# Patient Record
Sex: MALE | Race: WHITE | NOT HISPANIC OR LATINO | Employment: OTHER | URBAN - METROPOLITAN AREA
[De-identification: names, ages, dates, MRNs, and addresses within clinical notes are randomized per-mention and may not be internally consistent; named-entity substitution may affect disease eponyms.]

---

## 2017-01-05 ENCOUNTER — TELEPHONE (OUTPATIENT)
Dept: INFECTIOUS DISEASES | Facility: CLINIC | Age: 77
End: 2017-01-05

## 2017-01-05 NOTE — TELEPHONE ENCOUNTER
Patient called me on 1/2 to advise that he was in [place] and was unable to make his follow up appointments.  He reports he is overall feeling well.  Still taking suppressive abx.  Advised he will re-schedule.  I will have MA follow up to ensure it is scheduled.

## 2017-01-05 NOTE — TELEPHONE ENCOUNTER
Called patient to reschedule. There was no answer.On 1/3 he cancelled his appointments and said he will out of town for some business and will call back to reschedule. I will contact him again.

## 2017-01-06 RX ORDER — CEPHALEXIN 500 MG/1
CAPSULE ORAL
Qty: 60 CAPSULE | Refills: 0 | Status: SHIPPED | OUTPATIENT
Start: 2017-01-06 | End: 2017-01-08 | Stop reason: SDUPTHER

## 2017-01-07 DIAGNOSIS — I38 ENDOCARDITIS, UNSPECIFIED CHRONICITY, UNSPECIFIED ENDOCARDITIS TYPE: Primary | ICD-10-CM

## 2017-01-08 DIAGNOSIS — I38 ENDOCARDITIS, UNSPECIFIED CHRONICITY, UNSPECIFIED ENDOCARDITIS TYPE: Primary | ICD-10-CM

## 2017-01-08 RX ORDER — CEPHALEXIN 500 MG/1
CAPSULE ORAL
Qty: 60 CAPSULE | Refills: 0 | Status: SHIPPED | OUTPATIENT
Start: 2017-01-08 | End: 2017-02-24 | Stop reason: SDUPTHER

## 2017-01-08 RX ORDER — CEPHALEXIN 500 MG/1
CAPSULE ORAL
Qty: 60 CAPSULE | OUTPATIENT
Start: 2017-01-08

## 2017-02-03 ENCOUNTER — TELEPHONE (OUTPATIENT)
Dept: INFECTIOUS DISEASES | Facility: CLINIC | Age: 77
End: 2017-02-03

## 2017-02-03 NOTE — TELEPHONE ENCOUNTER
Patient for scheduled appointment today as well as echocardiogram.  He call and cancelled this morning.  He has cancelled last several appointments and prior scheduled Echos. He is apparently out of town. He did not re-schedule.  Will follow up and attempt to make follow up appointment.  Needs to continue abx suppressive therapy until he sees he and has f/u echo.

## 2017-02-06 ENCOUNTER — OFFICE VISIT (OUTPATIENT)
Dept: FAMILY MEDICINE | Facility: CLINIC | Age: 77
End: 2017-02-06
Payer: COMMERCIAL

## 2017-02-06 ENCOUNTER — LAB VISIT (OUTPATIENT)
Dept: LAB | Facility: HOSPITAL | Age: 77
End: 2017-02-06
Attending: INTERNAL MEDICINE
Payer: COMMERCIAL

## 2017-02-06 VITALS
HEIGHT: 68 IN | WEIGHT: 219.13 LBS | BODY MASS INDEX: 33.21 KG/M2 | TEMPERATURE: 98 F | OXYGEN SATURATION: 98 % | DIASTOLIC BLOOD PRESSURE: 80 MMHG | SYSTOLIC BLOOD PRESSURE: 122 MMHG | HEART RATE: 81 BPM

## 2017-02-06 DIAGNOSIS — I38: ICD-10-CM

## 2017-02-06 DIAGNOSIS — Z85.038 HISTORY OF COLON CANCER: ICD-10-CM

## 2017-02-06 DIAGNOSIS — I50.20: ICD-10-CM

## 2017-02-06 DIAGNOSIS — Z12.5 SCREENING FOR PROSTATE CANCER: ICD-10-CM

## 2017-02-06 DIAGNOSIS — E78.5 HYPERLIPIDEMIA, UNSPECIFIED HYPERLIPIDEMIA TYPE: ICD-10-CM

## 2017-02-06 DIAGNOSIS — R73.9 HYPERGLYCEMIA: ICD-10-CM

## 2017-02-06 DIAGNOSIS — F39 MOOD DISORDER: ICD-10-CM

## 2017-02-06 DIAGNOSIS — N18.30 CHRONIC KIDNEY DISEASE, STAGE III (MODERATE): ICD-10-CM

## 2017-02-06 DIAGNOSIS — I10 ESSENTIAL HYPERTENSION: ICD-10-CM

## 2017-02-06 DIAGNOSIS — E03.9 HYPOTHYROIDISM, UNSPECIFIED TYPE: ICD-10-CM

## 2017-02-06 DIAGNOSIS — Z00.00 ROUTINE MEDICAL EXAM: Primary | ICD-10-CM

## 2017-02-06 DIAGNOSIS — I77.9 CAROTID DISEASE, BILATERAL: ICD-10-CM

## 2017-02-06 LAB
ALBUMIN SERPL BCP-MCNC: 3.5 G/DL
ALP SERPL-CCNC: 29 U/L
ALT SERPL W/O P-5'-P-CCNC: 20 U/L
ANION GAP SERPL CALC-SCNC: 8 MMOL/L
AST SERPL-CCNC: 25 U/L
BASOPHILS # BLD AUTO: 0.04 K/UL
BASOPHILS NFR BLD: 0.4 %
BILIRUB SERPL-MCNC: 0.6 MG/DL
BUN SERPL-MCNC: 20 MG/DL
CALCIUM SERPL-MCNC: 9.3 MG/DL
CHLORIDE SERPL-SCNC: 108 MMOL/L
CO2 SERPL-SCNC: 23 MMOL/L
COMPLEXED PSA SERPL-MCNC: 0.9 NG/ML
CREAT SERPL-MCNC: 1.3 MG/DL
DIFFERENTIAL METHOD: ABNORMAL
EOSINOPHIL # BLD AUTO: 0.4 K/UL
EOSINOPHIL NFR BLD: 3.7 %
ERYTHROCYTE [DISTWIDTH] IN BLOOD BY AUTOMATED COUNT: 14.6 %
EST. GFR  (AFRICAN AMERICAN): >60 ML/MIN/1.73 M^2
EST. GFR  (NON AFRICAN AMERICAN): 52.3 ML/MIN/1.73 M^2
GLUCOSE SERPL-MCNC: 151 MG/DL
HCT VFR BLD AUTO: 37.7 %
HGB BLD-MCNC: 12.1 G/DL
LYMPHOCYTES # BLD AUTO: 3.1 K/UL
LYMPHOCYTES NFR BLD: 28.2 %
MCH RBC QN AUTO: 28.7 PG
MCHC RBC AUTO-ENTMCNC: 32.1 %
MCV RBC AUTO: 90 FL
MONOCYTES # BLD AUTO: 0.9 K/UL
MONOCYTES NFR BLD: 7.9 %
NEUTROPHILS # BLD AUTO: 6.6 K/UL
NEUTROPHILS NFR BLD: 59.5 %
PLATELET # BLD AUTO: 270 K/UL
PMV BLD AUTO: 10.8 FL
POTASSIUM SERPL-SCNC: 4.7 MMOL/L
PROT SERPL-MCNC: 7.4 G/DL
RBC # BLD AUTO: 4.21 M/UL
SODIUM SERPL-SCNC: 139 MMOL/L
T4 FREE SERPL-MCNC: 0.99 NG/DL
TSH SERPL DL<=0.005 MIU/L-ACNC: 1.73 UIU/ML
WBC # BLD AUTO: 11.08 K/UL

## 2017-02-06 PROCEDURE — 99397 PER PM REEVAL EST PAT 65+ YR: CPT | Mod: S$GLB,,, | Performed by: INTERNAL MEDICINE

## 2017-02-06 PROCEDURE — 85025 COMPLETE CBC W/AUTO DIFF WBC: CPT

## 2017-02-06 PROCEDURE — 83036 HEMOGLOBIN GLYCOSYLATED A1C: CPT

## 2017-02-06 PROCEDURE — 84439 ASSAY OF FREE THYROXINE: CPT

## 2017-02-06 PROCEDURE — 36415 COLL VENOUS BLD VENIPUNCTURE: CPT | Mod: PO

## 2017-02-06 PROCEDURE — 84153 ASSAY OF PSA TOTAL: CPT

## 2017-02-06 PROCEDURE — 3079F DIAST BP 80-89 MM HG: CPT | Mod: S$GLB,,, | Performed by: INTERNAL MEDICINE

## 2017-02-06 PROCEDURE — 84443 ASSAY THYROID STIM HORMONE: CPT

## 2017-02-06 PROCEDURE — 99999 PR PBB SHADOW E&M-EST. PATIENT-LVL III: CPT | Mod: PBBFAC,,, | Performed by: INTERNAL MEDICINE

## 2017-02-06 PROCEDURE — 3074F SYST BP LT 130 MM HG: CPT | Mod: S$GLB,,, | Performed by: INTERNAL MEDICINE

## 2017-02-06 PROCEDURE — 80053 COMPREHEN METABOLIC PANEL: CPT

## 2017-02-06 NOTE — PROGRESS NOTES
Chief complaint: Physical exam         77-year-old white male here for his physical.  It looks like he is due for annual labs and his PSA.  He's had some hyperglycemia so we will assess for diabetes although A1c is normal in the past.  He has hypothyroidism and we will reassess overall control.  He has hypertension and he has a cardiomyopathy but apparently no symptoms referable to that.  He does have some chronic kidney disease that needs reassessment as well.  He has a history of colon cancer and apparently has had screening outside of our system    ROS:   CONST: weight stable. EYES: no vision change. ENT: no sore throat. CV: no chest pain w/ exertion. RESP: no shortness of breath. GI: no nausea, vomiting, diarrhea. No dysphagia. : no urinary issues. MUSCULOSKELETAL: no new myalgias or arthralgias. SKIN: no new changes. NEURO: no focal deficits. PSYCH: no new issues. ENDOCRINE: no polyuria. HEME: no lymph nodes. ALLERGY: no general pruritis.    Past Medical History   Diagnosis Date    Abdominal hernia     Hypertension     GERD (gastroesophageal reflux disease)     Hyperlipidemia     MI, old 2014    Colon cancer--- ressected over 15 yrs, scope normal 2016     Endocarditis     Cardiac arrest 11/2015    Cardiomyopathy     Coronary artery disease     S/P CABG (coronary artery bypass graft)     S/P AVR     Mitral regurgitation     Hypothyroidism     Essential hypertension     Chronic kidney disease, stage III (moderate)     Systolic heart failure due to valvular disease     Carotid disease, bilateral    Anxiety disorder 2016    Past Surgical History   Procedure Laterality Date    Colon surgery  2000    Cardiac surgery      Tonsillectomy      Aortic valve replacement  2014    Cardiac valve surgery       Bio AVR 2014    Coronary artery bypass graft  2014     x2     Social history: Never smoked, was drinking about 4-5 beers per day,  with children, enjoys fishing, retired    Family  history: Father  at 83 and mom  at 97 without any known medical problems but there is unspecified hypertension in the family.  Sisters without any known medical problems.    Vital signs as above,   Gen: no distress  EYES: conjunctiva clear, non-icteric, PERRL  ENT: nose clear, nasal mucosa normal, oropharynx clear and moist, teeth good  NECK:supple, thyroid non-palpable  RESP: effort is good, lungs clear  CV: heart RRR w/o murmur, gallops or rubs; no carotid bruits, +1 edema  GI: abdomen soft, non-distended, non-tender, no hepatosplenomegaly  MS: gait normal, no clubbing or cyanosis of the digits  SKIN: no rashes, warm to touch    Rolo was seen today for annual exam.    Diagnoses and all orders for this visit:    Routine medical exam -UTD on colon, check PSA    Screening for prostate cancer  -     PSA, Screening; Future    Hypothyroidism, unspecified type  -     TSH; Future  -     T4, free; Future    Mood disorder, take wellbutrin DAILY    Chronic kidney disease, stage III (moderate)  -     Comprehensive metabolic panel; Future  -     CBC auto differential; Future    Systolic heart failure due to valvular disease    Essential hypertension, good    Hyperlipidemia, unspecified hyperlipidemia type    Carotid disease, bilateral    Hyperglycemia  -     Hemoglobin A1c; Future    History of colon cancer

## 2017-02-07 LAB
ESTIMATED AVG GLUCOSE: 126 MG/DL
HBA1C MFR BLD HPLC: 6 %

## 2017-02-15 ENCOUNTER — TELEPHONE (OUTPATIENT)
Dept: FAMILY MEDICINE | Facility: CLINIC | Age: 77
End: 2017-02-15

## 2017-02-15 NOTE — TELEPHONE ENCOUNTER
Pt came in this afternoon for a blood pressure check. Pt states he took his blood pressure medications about 4 hours ago. metoprolol succinate (TOPROL-XL) 25 MG, pt took 1 1/2 tablets and lisinopril (PRINIVIL,ZESTRIL) 20 MG tablet, pt took 2 tablets. He checked his blood pressure around noon and it was 184/102.    His blood pressure when he came into the office @ 12:45pm was as follows:    BP: 192/98  P: 94  O2: 96%

## 2017-02-15 NOTE — TELEPHONE ENCOUNTER
Patient came in the office Wednesday with I blood pressure so when calling with results also has, blood pressures doing    Dr. SUNSHINE says all the recent lab work looks stable.  The kidney insufficiency is the same as before.  Slight anemia is the same.  Sugar thyroid and liver all normal.  PSA normal

## 2017-02-15 NOTE — TELEPHONE ENCOUNTER
Patient seen and examined by myself even though he didn't have an appointment.  No neurological deficits and was more anxious.  I recommend he go home and continue to take his medications and he was reassured that the acute rise in his blood pressure will not cause a stroke or damage his heart valve.  We might need to increase his medications but previously on this medication his blood pressure was fine.  His pulse was okay to justify an occasional increase in his metoprolol.  I reassured him that I would not recommend giving him clonidine in the office since it might have unpredictable effects

## 2017-02-16 NOTE — TELEPHONE ENCOUNTER
Informed patient of information below. Verbalized understanding. Stated, blood pressure 160/90 this morning 1 hour after taking his medication (metoprolol 2 pills and lisinopril 2 pills). Please advise.

## 2017-02-16 NOTE — TELEPHONE ENCOUNTER
Reassure the medications may continue to have more effect and may need time.  He can monitor today on the current dosing

## 2017-02-17 ENCOUNTER — NURSE TRIAGE (OUTPATIENT)
Dept: ADMINISTRATIVE | Facility: CLINIC | Age: 77
End: 2017-02-17

## 2017-02-17 ENCOUNTER — TELEPHONE (OUTPATIENT)
Dept: FAMILY MEDICINE | Facility: CLINIC | Age: 77
End: 2017-02-17

## 2017-02-17 NOTE — TELEPHONE ENCOUNTER
----- Message from Danette Gray sent at 2/16/2017  3:10 PM CST -----  Patient is returning a call from the office. Please call at [phone] Thank you!

## 2017-02-17 NOTE — TELEPHONE ENCOUNTER
Reason for Disposition   [1] BP  >= 140/90 AND [2] taking BP medications    Protocols used: ST HIGH BLOOD PRESSURE-A-    Patient called with concerns about his bp being elevated. bp right now is 161/90 heart rate of 74, no symptoms reported. Patient encouraged to log his bp values and present them to pcp next appt. Patient knows to seek medical attn if his bp rises more and he starts to having symptoms.

## 2017-02-21 ENCOUNTER — TELEPHONE (OUTPATIENT)
Dept: FAMILY MEDICINE | Facility: CLINIC | Age: 77
End: 2017-02-21

## 2017-02-21 ENCOUNTER — NURSE TRIAGE (OUTPATIENT)
Dept: ADMINISTRATIVE | Facility: CLINIC | Age: 77
End: 2017-02-21

## 2017-02-21 NOTE — TELEPHONE ENCOUNTER
"Amanda Juan RN 6 minutes ago (9:16 AM)                       Reason for Disposition   BP >= 180/110    Answer Assessment - Initial Assessment Questions  1. BLOOD PRESSURE: "What is the blood pressure?" "Did you take at least two measurements 5 minutes apart?"  /88, HR=64 before taking meds.   2. ONSET: "When did you take your blood pressure?"  20 mins ago   3. HOW: "How did you obtain the blood pressure?" (e.g., visiting nurse, automatic home BP monitor)  Yes 4. HISTORY: "Do you have a history of high blood pressure?"  Yes   5. MEDICATIONS: "Are you taking any medications for blood pressure?" "Have you missed any doses recently?"  Doubled metoprolol to 50 mg recently, lisinopril, No   6. OTHER SYMPTOMS: "Do you have any symptoms?" (e.g., headache, chest pain, blurred vision, difficulty breathing, weakness)  No CP, no SOB,, no HA    Protocols used: ST HIGH BLOOD PRESSURE-A-     Call back with questions.        "

## 2017-02-21 NOTE — TELEPHONE ENCOUNTER
"    Reason for Disposition   BP  >= 180/110    Answer Assessment - Initial Assessment Questions  1. BLOOD PRESSURE: "What is the blood pressure?" "Did you take at least two measurements 5 minutes apart?"      /88, HR=64 before taking meds.   2. ONSET: "When did you take your blood pressure?"      20 mins ago   3. HOW: "How did you obtain the blood pressure?" (e.g., visiting nurse, automatic home BP monitor)      Yes 4. HISTORY: "Do you have a history of high blood pressure?"      Yes   5. MEDICATIONS: "Are you taking any medications for blood pressure?" "Have you missed any doses recently?"      Doubled metoprolol to 50 mg recently, lisinopril, No   6. OTHER SYMPTOMS: "Do you have any symptoms?" (e.g., headache, chest pain, blurred vision, difficulty breathing, weakness)     No CP, no SOB,, no HA    Protocols used:  HIGH BLOOD PRESSURE-A-    Call back with questions.   "

## 2017-02-24 ENCOUNTER — TELEPHONE (OUTPATIENT)
Dept: INFECTIOUS DISEASES | Facility: CLINIC | Age: 77
End: 2017-02-24

## 2017-02-24 DIAGNOSIS — I38 ENDOCARDITIS, UNSPECIFIED CHRONICITY, UNSPECIFIED ENDOCARDITIS TYPE: ICD-10-CM

## 2017-02-24 RX ORDER — CEPHALEXIN 500 MG/1
CAPSULE ORAL
Qty: 60 CAPSULE | Refills: 0 | Status: SHIPPED | OUTPATIENT
Start: 2017-02-24 | End: 2017-03-24 | Stop reason: SDUPTHER

## 2017-02-24 NOTE — TELEPHONE ENCOUNTER
Called patient to reschedule his echo and fu with Dayana Pelletier due to him missing several appointments. He did not answer so I scheduled and mailed him the appointments.

## 2017-03-06 ENCOUNTER — TELEPHONE (OUTPATIENT)
Dept: INFECTIOUS DISEASES | Facility: CLINIC | Age: 77
End: 2017-03-06

## 2017-03-06 NOTE — TELEPHONE ENCOUNTER
Received a message that patient is moving to [place] and will have to cancel both appointments that I scheduled for him. He states he will call back to schedule again.

## 2017-03-06 NOTE — TELEPHONE ENCOUNTER
----- Message from Darleen May sent at 3/6/2017  9:28 AM CST -----  Contact: self  Patient states in process of moving to [place]   Patient need to cancel echo and doctor appointment.   Pt states will call to reschhedule

## 2017-03-09 ENCOUNTER — OFFICE VISIT (OUTPATIENT)
Dept: FAMILY MEDICINE | Facility: CLINIC | Age: 77
End: 2017-03-09
Payer: COMMERCIAL

## 2017-03-09 VITALS
HEART RATE: 80 BPM | DIASTOLIC BLOOD PRESSURE: 78 MMHG | SYSTOLIC BLOOD PRESSURE: 168 MMHG | WEIGHT: 214.06 LBS | HEIGHT: 68 IN | OXYGEN SATURATION: 98 % | TEMPERATURE: 99 F | BODY MASS INDEX: 32.44 KG/M2

## 2017-03-09 DIAGNOSIS — F39 MOOD DISORDER: ICD-10-CM

## 2017-03-09 DIAGNOSIS — D64.9 ANEMIA, UNSPECIFIED TYPE: ICD-10-CM

## 2017-03-09 DIAGNOSIS — F32.A DEPRESSION, UNSPECIFIED DEPRESSION TYPE: ICD-10-CM

## 2017-03-09 DIAGNOSIS — N18.30 CHRONIC KIDNEY DISEASE, STAGE III (MODERATE): ICD-10-CM

## 2017-03-09 DIAGNOSIS — R53.1 EPISODIC WEAKNESS: ICD-10-CM

## 2017-03-09 DIAGNOSIS — I10 ESSENTIAL HYPERTENSION: Primary | ICD-10-CM

## 2017-03-09 PROCEDURE — 1126F AMNT PAIN NOTED NONE PRSNT: CPT | Mod: S$GLB,,, | Performed by: INTERNAL MEDICINE

## 2017-03-09 PROCEDURE — 1160F RVW MEDS BY RX/DR IN RCRD: CPT | Mod: S$GLB,,, | Performed by: INTERNAL MEDICINE

## 2017-03-09 PROCEDURE — 3077F SYST BP >= 140 MM HG: CPT | Mod: S$GLB,,, | Performed by: INTERNAL MEDICINE

## 2017-03-09 PROCEDURE — 3078F DIAST BP <80 MM HG: CPT | Mod: S$GLB,,, | Performed by: INTERNAL MEDICINE

## 2017-03-09 PROCEDURE — 99999 PR PBB SHADOW E&M-EST. PATIENT-LVL III: CPT | Mod: PBBFAC,,, | Performed by: INTERNAL MEDICINE

## 2017-03-09 PROCEDURE — 1157F ADVNC CARE PLAN IN RCRD: CPT | Mod: S$GLB,,, | Performed by: INTERNAL MEDICINE

## 2017-03-09 PROCEDURE — 99215 OFFICE O/P EST HI 40 MIN: CPT | Mod: S$GLB,,, | Performed by: INTERNAL MEDICINE

## 2017-03-09 PROCEDURE — 1159F MED LIST DOCD IN RCRD: CPT | Mod: S$GLB,,, | Performed by: INTERNAL MEDICINE

## 2017-03-09 RX ORDER — SERTRALINE HYDROCHLORIDE 50 MG/1
50 TABLET, FILM COATED ORAL DAILY
Qty: 30 TABLET | Refills: 11 | Status: SHIPPED | OUTPATIENT
Start: 2017-03-09 | End: 2018-03-09

## 2017-03-09 NOTE — PROGRESS NOTES
Chief complaint: Not feeling well, weak spells, follow-up on hypertension and depression         77-year-old white male with multiple medical problems.  He has been depressed lately.  He feels he's not getting along well.  He is on the Wellbutrin 300 although he did not bring his medications and I cannot confirm 100%.  He still angry about the medical events that happened to him outside of our system.  He still very angry at the surgeon who sent him home to die.  We discussed a self referral to our  and counselor here as he does need some assistance coping and going through some anger management and resolution issues.  He is open to that.  He also did well on Zoloft in the past and I explained at great length today that we'll likely need to add an additional medication to raise serotonin.  We will start with 25 mg for 6 days or so and then go to 50 mg.  All these instructions were written out for him.        Continues to have episodes of a few minutes of feeling very weak.  What he describes could well be hypoglycemia.  He has not been eating as well since his wife has not been working.  We discussed that if indeed he keeps candy with him or drink some juice the next time he gets an episode and it resolves more probably I will be supportive of low sugar.    I did review all his prior labs.  His chronic kidney disease is fairly stable at this.  He does admit not drinking a lot of water and I encouraged him to do so.  Next    Regarding hypertension his blood pressure at home has been similar to today at 150.  Again he did not bring his medications but says he is taking the lisinopril 20 mg a day and Toprol 25 mg a day.  At one point when he increased the lisinopril to 40 mg a day his blood pressure started to get too low but we discussed that at the present time his blood pressures definitely high.  I recommended he go to lisinopril 40 mg or 20 mg twice a day.  He may need a total of 30 mg of his blood  pressure gets too low.  His pulse is good we might increase the metoprolol as well.  All these issues reviewed and patient counseled at great length.  Counseled regarding all the above issues especially the psychosocial issues.  He does not feel depressed enough to be harmful to himself.Total time over 45 minutes with over 50% counseling.        ROS:   CONST: weight stable. EYES: no vision change. ENT: no sore throat. CV: no chest pain w/ exertion. RESP: no shortness of breath. GI: no nausea, vomiting, diarrhea. No dysphagia. : no urinary issues. MUSCULOSKELETAL: no new myalgias or arthralgias. SKIN: no new changes. NEURO: no focal deficits. PSYCH: no new issues. ENDOCRINE: no polyuria. HEME: no lymph nodes. ALLERGY: no general pruritis.    Past Medical History   Diagnosis Date    Abdominal hernia     Hypertension     GERD (gastroesophageal reflux disease)     Hyperlipidemia     MI, old     Colon cancer--- ressected over 15 yrs, scope normal      Endocarditis     Cardiac arrest 2015    Cardiomyopathy     Coronary artery disease     S/P CABG (coronary artery bypass graft)     S/P AVR     Mitral regurgitation     Hypothyroidism     Essential hypertension     Chronic kidney disease, stage III (moderate)     Systolic heart failure due to valvular disease     Carotid disease, bilateral    Anxiety disorder/depression 2016    Past Surgical History   Procedure Laterality Date    Colon surgery      Cardiac surgery      Tonsillectomy      Aortic valve replacement      Cardiac valve surgery       Bio AVR 2014    Coronary artery bypass graft  2014     x2     Social history: Never smoked, was drinking about 4-5 beers per day,  with children, enjoys fishing, retired    Family history: Father  at 83 and mom  at 97 without any known medical problems but there is unspecified hypertension in the family.  Sisters without any known medical problems.    Vital signs as above,    Gen: no distress  Rolo was seen today for hypertension and depression.    Diagnoses and all orders for this visit:    Essential hypertension, uncontrolled, medication adjustments as above    Mood disorder, worsening depression, refer to counselor for coping issues as above, add Zoloft to Wellbutrin    Chronic kidney disease, stage III (moderate) chronic and stable, increase fluids, recheck in 1 month    Depression, unspecified depression type    Episodic weakness, suspect low sugars, discussed better diet and more frequent diet, assess response to eating, he has check blood pressure does not appear to be hypotension, all recent labs otherwise unremarkable and symptoms do not suggest any reason to repeat any labs    Anemia, unspecified type, chronic and stable    Other orders  -     sertraline (ZOLOFT) 50 MG tablet; Take 1 tablet (50 mg total) by mouth once daily.

## 2017-03-14 ENCOUNTER — TELEPHONE (OUTPATIENT)
Dept: INFECTIOUS DISEASES | Facility: CLINIC | Age: 77
End: 2017-03-14

## 2017-03-14 NOTE — TELEPHONE ENCOUNTER
Received call from Mr. Blanca on 3/10  advising that he is moving to [place] to be near his son (also after shooting in his neighborhood) and wanted to touch bases to so advise.  He has missed last 6 follow up appointments for endocarditis with me, though he is following with his PCP for HTN and depression.     Plan was for repeat echo at beginning of year to evaluate resolution of prior vegetation and possible discontinuation of suppressive abx,  but he has not kept these appointments. He reports he is still has Keflex and is taking and does not need refill at this time,  though I suspect he has not been taking these.        He states he will set up follow up once settled as he will be returning frequently to [facility].  Have asked him to let me know when needs refills and where his new pharmacy is so I can call.  He reports he is feeling well overall, no fevers, chills, new weakness/fatigue.  Strongly encouraged follow up.  Call immediately with new fevers, chills, other systemic symptoms.     Labs recently done in February.  Reviewed.

## 2017-03-24 DIAGNOSIS — I38 ENDOCARDITIS, UNSPECIFIED CHRONICITY, UNSPECIFIED ENDOCARDITIS TYPE: ICD-10-CM

## 2017-03-24 RX ORDER — LEVOTHYROXINE SODIUM 75 UG/1
TABLET ORAL
Qty: 90 TABLET | Refills: 1 | Status: SHIPPED | OUTPATIENT
Start: 2017-03-24

## 2017-03-24 RX ORDER — ATORVASTATIN CALCIUM 10 MG/1
TABLET, FILM COATED ORAL
Qty: 90 TABLET | Refills: 1 | Status: SHIPPED | OUTPATIENT
Start: 2017-03-24

## 2017-03-24 RX ORDER — CEPHALEXIN 500 MG/1
CAPSULE ORAL
Qty: 60 CAPSULE | Refills: 0 | Status: SHIPPED | OUTPATIENT
Start: 2017-03-24 | End: 2017-04-20 | Stop reason: SDUPTHER

## 2017-04-07 ENCOUNTER — OFFICE VISIT (OUTPATIENT)
Dept: FAMILY MEDICINE | Facility: CLINIC | Age: 77
End: 2017-04-07
Payer: COMMERCIAL

## 2017-04-07 VITALS
SYSTOLIC BLOOD PRESSURE: 150 MMHG | HEART RATE: 99 BPM | DIASTOLIC BLOOD PRESSURE: 70 MMHG | OXYGEN SATURATION: 97 % | TEMPERATURE: 98 F | HEIGHT: 68 IN | WEIGHT: 215.5 LBS | BODY MASS INDEX: 32.66 KG/M2

## 2017-04-07 DIAGNOSIS — E03.9 HYPOTHYROIDISM, UNSPECIFIED TYPE: Primary | ICD-10-CM

## 2017-04-07 PROCEDURE — 1159F MED LIST DOCD IN RCRD: CPT | Mod: S$GLB,,, | Performed by: NURSE PRACTITIONER

## 2017-04-07 PROCEDURE — 1157F ADVNC CARE PLAN IN RCRD: CPT | Mod: S$GLB,,, | Performed by: NURSE PRACTITIONER

## 2017-04-07 PROCEDURE — 1126F AMNT PAIN NOTED NONE PRSNT: CPT | Mod: S$GLB,,, | Performed by: NURSE PRACTITIONER

## 2017-04-07 PROCEDURE — 3077F SYST BP >= 140 MM HG: CPT | Mod: S$GLB,,, | Performed by: NURSE PRACTITIONER

## 2017-04-07 PROCEDURE — 1160F RVW MEDS BY RX/DR IN RCRD: CPT | Mod: S$GLB,,, | Performed by: NURSE PRACTITIONER

## 2017-04-07 PROCEDURE — 99213 OFFICE O/P EST LOW 20 MIN: CPT | Mod: S$GLB,,, | Performed by: NURSE PRACTITIONER

## 2017-04-07 PROCEDURE — 3078F DIAST BP <80 MM HG: CPT | Mod: S$GLB,,, | Performed by: NURSE PRACTITIONER

## 2017-04-07 PROCEDURE — 99999 PR PBB SHADOW E&M-EST. PATIENT-LVL III: CPT | Mod: PBBFAC,,, | Performed by: NURSE PRACTITIONER

## 2017-04-07 NOTE — PROGRESS NOTES
"Subjective:       Patient ID: Rolo Blanca is a 77 y.o. male.    Chief Complaint: Fatigue    HPI Comments: 76 y/o male in office with an "ill feelling". Does not have no specific symptoms. Has not been taking his regular meds and nothing for the illness. Denies shortness of breath, dizziness, chest pain or headache.       Past Medical History:   Diagnosis Date    MI, old 2014       Social History     Social History    Marital status:      Spouse name: N/A    Number of children: N/A    Years of education: N/A     Occupational History    Not on file.     Social History Main Topics    Smoking status: Never Smoker    Smokeless tobacco: Not on file    Alcohol use Yes      Comment: 2-3 X'S A WEEK    Drug use: No    Sexual activity: Not on file     Other Topics Concern    Not on file     Social History Narrative       Past Surgical History:   Procedure Laterality Date    AORTIC VALVE REPLACEMENT  2014    CARDIAC SURGERY      CARDIAC VALVE SURGERY      Bio AVR 2014    COLON SURGERY  2000    CORONARY ARTERY BYPASS GRAFT  2014    x2    TONSILLECTOMY         Review of Systems   Constitutional: Negative for activity change, appetite change, chills, fatigue and fever.   Respiratory: Negative for chest tightness, shortness of breath and wheezing.    Cardiovascular: Negative for chest pain and palpitations.   Gastrointestinal: Negative for diarrhea, nausea and vomiting.   Endocrine: Negative for cold intolerance, heat intolerance, polydipsia, polyphagia and polyuria.   Genitourinary: Negative for difficulty urinating, dysuria and frequency.   Musculoskeletal: Negative for arthralgias.   Skin: Negative for rash.   All other systems reviewed and are negative.      Objective:   BP (!) 150/70 (BP Location: Left arm, Patient Position: Sitting, BP Method: Manual)  Pulse 99  Temp 98 °F (36.7 °C) (Oral)   Ht 5' 8" (1.727 m)  Wt 97.7 kg (215 lb 8 oz)  SpO2 97%  BMI 32.77 kg/m2     Physical Exam "   Constitutional: He is oriented to person, place, and time. He appears well-developed and well-nourished.   HENT:   Head: Normocephalic and atraumatic.   Cardiovascular: Normal rate and regular rhythm.    Murmur heard.  Pulmonary/Chest: Effort normal and breath sounds normal. No respiratory distress. He has no decreased breath sounds. He has no wheezes. He has no rhonchi. He has no rales.   Neurological: He is alert and oriented to person, place, and time.   Skin: Skin is warm, dry and intact. He is not diaphoretic. No pallor.   Psychiatric: He has a normal mood and affect. His speech is normal and behavior is normal.       Assessment:       1. Hypothyroidism, unspecified type        Plan:       Rolo was seen today for fatigue.    Diagnoses and all orders for this visit:    Hypothyroidism, unspecified type       Encouraged to restart thyroid medication. Had stopped because his last couple of labs were normal. Encouraged to take BP medication consistently. Provided him with information on the effects of thyroid not being properly managed. He verbalized understanding.

## 2017-04-20 DIAGNOSIS — I38 ENDOCARDITIS, UNSPECIFIED CHRONICITY, UNSPECIFIED ENDOCARDITIS TYPE: ICD-10-CM

## 2017-04-27 ENCOUNTER — TELEPHONE (OUTPATIENT)
Dept: INFECTIOUS DISEASES | Facility: CLINIC | Age: 77
End: 2017-04-27

## 2017-05-03 RX ORDER — CEPHALEXIN 500 MG/1
CAPSULE ORAL
Qty: 60 CAPSULE | Refills: 0 | Status: ON HOLD | OUTPATIENT
Start: 2017-05-03 | End: 2017-07-25 | Stop reason: HOSPADM

## 2017-05-18 DIAGNOSIS — I38 ENDOCARDITIS, UNSPECIFIED CHRONICITY, UNSPECIFIED ENDOCARDITIS TYPE: ICD-10-CM

## 2017-05-22 RX ORDER — CEPHALEXIN 500 MG/1
CAPSULE ORAL
Qty: 60 CAPSULE | OUTPATIENT
Start: 2017-05-22

## 2017-05-22 NOTE — TELEPHONE ENCOUNTER
From the last call with Mr. Blanca he said that he will call and schedule and appointment because he is going back and forth and needs to get settled in. I will call him and ask if he is taking antibiotics.

## 2017-05-22 NOTE — TELEPHONE ENCOUNTER
Please call  [facility].  I received a refill request from his Pharmacy.  He needs a follow up visit and Echo.  Is he taking the antibiotics?

## 2017-06-13 ENCOUNTER — TELEPHONE (OUTPATIENT)
Dept: FAMILY MEDICINE | Facility: CLINIC | Age: 77
End: 2017-06-13

## 2017-06-13 DIAGNOSIS — N18.3 CKD (CHRONIC KIDNEY DISEASE), STAGE 3 (MODERATE): ICD-10-CM

## 2017-06-13 DIAGNOSIS — D64.9 ANEMIA, UNSPECIFIED TYPE: Primary | ICD-10-CM

## 2017-06-13 DIAGNOSIS — E78.5 HYPERLIPIDEMIA, UNSPECIFIED HYPERLIPIDEMIA TYPE: ICD-10-CM

## 2017-06-13 NOTE — TELEPHONE ENCOUNTER
----- Message from Sandee Coleman sent at 6/13/2017  8:17 AM CDT -----  Contact: self  Patient called stating that he needs labs. OV scheduled for 6/20. Please contact him at [phone].    Thanks!

## 2017-06-17 ENCOUNTER — LAB VISIT (OUTPATIENT)
Dept: LAB | Facility: HOSPITAL | Age: 77
End: 2017-06-17
Attending: INTERNAL MEDICINE
Payer: COMMERCIAL

## 2017-06-17 DIAGNOSIS — D64.9 ANEMIA, UNSPECIFIED TYPE: ICD-10-CM

## 2017-06-17 DIAGNOSIS — N18.3 CKD (CHRONIC KIDNEY DISEASE), STAGE 3 (MODERATE): ICD-10-CM

## 2017-06-17 DIAGNOSIS — E78.5 HYPERLIPIDEMIA, UNSPECIFIED HYPERLIPIDEMIA TYPE: ICD-10-CM

## 2017-06-17 LAB
ANION GAP SERPL CALC-SCNC: 9 MMOL/L
BASOPHILS # BLD AUTO: 0.07 K/UL
BASOPHILS NFR BLD: 0.6 %
BUN SERPL-MCNC: 21 MG/DL
CALCIUM SERPL-MCNC: 9.5 MG/DL
CHLORIDE SERPL-SCNC: 107 MMOL/L
CHOLEST/HDLC SERPL: 3 {RATIO}
CO2 SERPL-SCNC: 21 MMOL/L
CREAT SERPL-MCNC: 1.5 MG/DL
DIFFERENTIAL METHOD: ABNORMAL
EOSINOPHIL # BLD AUTO: 0.5 K/UL
EOSINOPHIL NFR BLD: 3.7 %
ERYTHROCYTE [DISTWIDTH] IN BLOOD BY AUTOMATED COUNT: 15.3 %
EST. GFR  (AFRICAN AMERICAN): 50.8 ML/MIN/1.73 M^2
EST. GFR  (NON AFRICAN AMERICAN): 44 ML/MIN/1.73 M^2
ESTIMATED AVG GLUCOSE: 126 MG/DL
FERRITIN SERPL-MCNC: 21 NG/ML
GLUCOSE SERPL-MCNC: 104 MG/DL
HBA1C MFR BLD HPLC: 6 %
HCT VFR BLD AUTO: 38.3 %
HDL/CHOLESTEROL RATIO: 33.3 %
HDLC SERPL-MCNC: 138 MG/DL
HDLC SERPL-MCNC: 46 MG/DL
HGB BLD-MCNC: 11.9 G/DL
IRON SERPL-MCNC: 55 UG/DL
LDLC SERPL CALC-MCNC: 65.2 MG/DL
LYMPHOCYTES # BLD AUTO: 3.8 K/UL
LYMPHOCYTES NFR BLD: 30 %
MCH RBC QN AUTO: 26.5 PG
MCHC RBC AUTO-ENTMCNC: 31.1 %
MCV RBC AUTO: 85 FL
MONOCYTES # BLD AUTO: 1.2 K/UL
MONOCYTES NFR BLD: 9.4 %
NEUTROPHILS # BLD AUTO: 7 K/UL
NEUTROPHILS NFR BLD: 56 %
NONHDLC SERPL-MCNC: 92 MG/DL
PLATELET # BLD AUTO: 279 K/UL
PMV BLD AUTO: 10.6 FL
POTASSIUM SERPL-SCNC: 5.4 MMOL/L
RBC # BLD AUTO: 4.49 M/UL
SATURATED IRON: 13 %
SODIUM SERPL-SCNC: 137 MMOL/L
TOTAL IRON BINDING CAPACITY: 438 UG/DL
TRANSFERRIN SERPL-MCNC: 296 MG/DL
TRIGL SERPL-MCNC: 134 MG/DL
VIT B12 SERPL-MCNC: 555 PG/ML
WBC # BLD AUTO: 12.55 K/UL

## 2017-06-17 PROCEDURE — 83540 ASSAY OF IRON: CPT

## 2017-06-17 PROCEDURE — 83036 HEMOGLOBIN GLYCOSYLATED A1C: CPT

## 2017-06-17 PROCEDURE — 85025 COMPLETE CBC W/AUTO DIFF WBC: CPT

## 2017-06-17 PROCEDURE — 36415 COLL VENOUS BLD VENIPUNCTURE: CPT | Mod: PO

## 2017-06-17 PROCEDURE — 80048 BASIC METABOLIC PNL TOTAL CA: CPT

## 2017-06-17 PROCEDURE — 80061 LIPID PANEL: CPT

## 2017-06-17 PROCEDURE — 84466 ASSAY OF TRANSFERRIN: CPT

## 2017-06-17 PROCEDURE — 82728 ASSAY OF FERRITIN: CPT

## 2017-06-17 PROCEDURE — 82607 VITAMIN B-12: CPT

## 2017-06-20 ENCOUNTER — OFFICE VISIT (OUTPATIENT)
Dept: FAMILY MEDICINE | Facility: CLINIC | Age: 77
End: 2017-06-20
Payer: COMMERCIAL

## 2017-06-20 VITALS
SYSTOLIC BLOOD PRESSURE: 120 MMHG | HEIGHT: 68 IN | DIASTOLIC BLOOD PRESSURE: 63 MMHG | WEIGHT: 218.94 LBS | TEMPERATURE: 98 F | OXYGEN SATURATION: 99 % | HEART RATE: 80 BPM | BODY MASS INDEX: 33.18 KG/M2

## 2017-06-20 DIAGNOSIS — F39 MOOD DISORDER: ICD-10-CM

## 2017-06-20 DIAGNOSIS — F32.A DEPRESSION, UNSPECIFIED DEPRESSION TYPE: ICD-10-CM

## 2017-06-20 DIAGNOSIS — E03.9 HYPOTHYROIDISM, UNSPECIFIED TYPE: ICD-10-CM

## 2017-06-20 DIAGNOSIS — D64.9 ANEMIA, UNSPECIFIED TYPE: ICD-10-CM

## 2017-06-20 DIAGNOSIS — N18.30 CHRONIC KIDNEY DISEASE, STAGE III (MODERATE): ICD-10-CM

## 2017-06-20 DIAGNOSIS — I10 ESSENTIAL HYPERTENSION: Primary | ICD-10-CM

## 2017-06-20 PROCEDURE — 1126F AMNT PAIN NOTED NONE PRSNT: CPT | Mod: S$GLB,,, | Performed by: INTERNAL MEDICINE

## 2017-06-20 PROCEDURE — 99999 PR PBB SHADOW E&M-EST. PATIENT-LVL III: CPT | Mod: PBBFAC,,, | Performed by: INTERNAL MEDICINE

## 2017-06-20 PROCEDURE — 99214 OFFICE O/P EST MOD 30 MIN: CPT | Mod: S$GLB,,, | Performed by: INTERNAL MEDICINE

## 2017-06-20 PROCEDURE — 1159F MED LIST DOCD IN RCRD: CPT | Mod: S$GLB,,, | Performed by: INTERNAL MEDICINE

## 2017-06-20 NOTE — PROGRESS NOTES
Chief complaint:  follow-up on hypertension and depression. Phys was 2/17         77-year-old white male with multiple medical problems.  He has been depressed lately.  He feels he's not getting along well.  He is on the Wellbutrin 300 although he did not bring his medications and I cannot confirm 100%.  He still angry about the medical events that happened to him outside of our system.  He still very angry at the surgeon who sent him home to die.  We discussed a self referral to our  and counselor here as he does need some assistance coping and going through some anger management and resolution issues.  He is open to that.  He also did well on Zoloft in the past and we added add an additional zoloft to raise serotonin.  He has not been taking it regular.  Still has crying spells.  We discussed depression and appropriate expectations.      Recent labs reveal a slight reduction in his GFR from 52 down to 44.  Potassium slightly up at 5.4.  Slight anemia is about the same and B12 is normal and ferritin slightly low normal at 21 and this is our only iron studies to review.  Lipid profile is at goal.  A1c is the same at 6.0.    Regarding hypertension his blood pressure at home and runs very normal 120/63.  He states he lisinopril 20 mg.  Discussed the increase in water intake for the slight renal insufficiency.  He'll monitor blood pressure morning and evening.  We might need the lisinopril twice a day if it runs high in the morning and so forth.  Counseled at length regarding all these issues particularly depression and treatment.Total time over 25 minutes with over 50% counseling.        ROS:   CONST: weight stable. EYES: no vision change. ENT: no sore throat. CV: no chest pain w/ exertion. RESP: no shortness of breath. GI: no nausea, vomiting, diarrhea. No dysphagia. : no urinary issues. MUSCULOSKELETAL: no new myalgias or arthralgias. SKIN: no new changes. NEURO: no focal deficits. PSYCH: no new  issues. ENDOCRINE: no polyuria. HEME: no lymph nodes. ALLERGY: no general pruritis.    Past Medical History   Diagnosis Date    Abdominal hernia     Hypertension     GERD (gastroesophageal reflux disease)     Hyperlipidemia     MI, old     Colon cancer--- ressected over 15 yrs, scope normal 2016     Endocarditis     Cardiac arrest 2015    Cardiomyopathy     Coronary artery disease     S/P CABG (coronary artery bypass graft)     S/P AVR     Mitral regurgitation     Hypothyroidism     Essential hypertension     Chronic kidney disease, stage III (moderate)     Systolic heart failure due to valvular disease     Carotid disease, bilateral    Anxiety disorder/depression     Past Surgical History   Procedure Laterality Date    Colon surgery      Cardiac surgery      Tonsillectomy      Aortic valve replacement      Cardiac valve surgery       Bio AVR     Coronary artery bypass graft  2014     x2     Social history: Never smoked, was drinking about 4-5 beers per day,  with children, enjoys fishing, retired    Family history: Father  at 83 and mom  at 97 without any known medical problems but there is unspecified hypertension in the family.  Sisters without any known medical problems.    Vital signs as above,   Gen: no distress        Rolo was seen today for results and hypertension.    Diagnoses and all orders for this visit:    Essential hypertension, chronic and stable, monitor at home    Mood disorder    Depression, unspecified depression type, still uncontrolled, again discussed benefits of Zoloft in addition to the Wellbutrin, encouraged he and his wife to make follow-up with me in about 2 months to assess response    Chronic kidney disease, stage III (moderate) slight worsening, follow-up in 2 months    Hypothyroidism, unspecified type, euthyroid    Anemia, unspecified type, chronic and stable    Other orders  -     Cancel: Pneumococcal Conjugate Vaccine  (13 Valent) (IM)

## 2017-07-11 ENCOUNTER — HOSPITAL ENCOUNTER (EMERGENCY)
Facility: OTHER | Age: 77
Discharge: HOME | End: 2017-07-11
Attending: EMERGENCY MEDICINE
Payer: COMMERCIAL

## 2017-07-11 VITALS
DIASTOLIC BLOOD PRESSURE: 76 MMHG | OXYGEN SATURATION: 97 % | WEIGHT: 210 LBS | HEART RATE: 89 BPM | SYSTOLIC BLOOD PRESSURE: 160 MMHG | HEIGHT: 68 IN | BODY MASS INDEX: 31.83 KG/M2 | RESPIRATION RATE: 16 BRPM | TEMPERATURE: 100 F

## 2017-07-11 DIAGNOSIS — K57.92 DIVERTICULITIS OF INTESTINE, UNSPECIFIED BLEEDING STATUS, UNSPECIFIED COMPLICATION STATUS, UNSPECIFIED PART OF INTESTINAL TRACT: Primary | ICD-10-CM

## 2017-07-11 DIAGNOSIS — R10.9 ABDOMINAL PAIN, UNSPECIFIED LOCATION: ICD-10-CM

## 2017-07-11 LAB
ALBUMIN SERPL-MCNC: 3.6 G/DL (ref 3.3–5.5)
ALP SERPL-CCNC: 27 U/L (ref 42–141)
BILIRUB SERPL-MCNC: 1 MG/DL (ref 0.2–1.6)
BILIRUBIN, POC UA: NEGATIVE
BLOOD, POC UA: ABNORMAL
BUN SERPL-MCNC: 15 MG/DL (ref 7–22)
CALCIUM SERPL-MCNC: 9.2 MG/DL (ref 8–10.3)
CHLORIDE SERPL-SCNC: 96 MMOL/L (ref 98–108)
CLARITY, POC UA: CLEAR
COLOR, POC UA: YELLOW
CREAT SERPL-MCNC: 1.3 MG/DL (ref 0.6–1.2)
GLUCOSE SERPL-MCNC: 149 MG/DL (ref 73–118)
GLUCOSE, POC UA: NEGATIVE
HCO3 UR-SCNC: 28.7 MMOL/L (ref 24–28)
KETONES, POC UA: NEGATIVE
LDH SERPL L TO P-CCNC: 1.56 MMOL/L (ref 0.5–2.2)
LEUKOCYTE EST, POC UA: NEGATIVE
NITRITE, POC UA: NEGATIVE
PCO2 BLDA: 43.1 MMHG (ref 35–45)
PH SMN: 7.43 [PH] (ref 7.35–7.45)
PH UR STRIP: 7 [PH]
PO2 BLDA: 34 MMHG (ref 40–60)
POC ALT (SGPT): 16 U/L (ref 10–47)
POC AST (SGOT): 32 U/L (ref 11–38)
POC B-TYPE NATRIURETIC PEPTIDE: 1330 PG/ML (ref 0–100)
POC BE: 4 MMOL/L
POC CARDIAC TROPONIN I: 0.04 NG/ML
POC PTINR: 1 (ref 0.9–1.2)
POC PTWBT: 11.6 SEC (ref 9.7–14.3)
POC SATURATED O2: 67 % (ref 95–100)
POC TCO2: 26 MMOL/L (ref 18–33)
POC TCO2: 30 MMOL/L (ref 24–29)
POTASSIUM BLD-SCNC: 4.2 MMOL/L (ref 3.6–5.1)
PROTEIN, POC UA: ABNORMAL
PROTEIN, POC: 7 G/DL (ref 6.4–8.1)
SAMPLE: ABNORMAL
SAMPLE: NORMAL
SAMPLE: NORMAL
SODIUM BLD-SCNC: 136 MMOL/L (ref 128–145)
SPECIFIC GRAVITY, POC UA: 1.02
UROBILINOGEN, POC UA: 0.2 E.U./DL

## 2017-07-11 PROCEDURE — 87040 BLOOD CULTURE FOR BACTERIA: CPT

## 2017-07-11 PROCEDURE — S0030 INJECTION, METRONIDAZOLE: HCPCS | Performed by: EMERGENCY MEDICINE

## 2017-07-11 PROCEDURE — 87186 SC STD MICRODIL/AGAR DIL: CPT

## 2017-07-11 PROCEDURE — 96367 TX/PROPH/DG ADDL SEQ IV INF: CPT

## 2017-07-11 PROCEDURE — 96372 THER/PROPH/DIAG INJ SC/IM: CPT

## 2017-07-11 PROCEDURE — 25000003 PHARM REV CODE 250: Performed by: EMERGENCY MEDICINE

## 2017-07-11 PROCEDURE — 87077 CULTURE AEROBIC IDENTIFY: CPT

## 2017-07-11 PROCEDURE — 99285 EMERGENCY DEPT VISIT HI MDM: CPT | Mod: 25

## 2017-07-11 PROCEDURE — 96375 TX/PRO/DX INJ NEW DRUG ADDON: CPT

## 2017-07-11 PROCEDURE — 83880 ASSAY OF NATRIURETIC PEPTIDE: CPT

## 2017-07-11 PROCEDURE — 85610 PROTHROMBIN TIME: CPT

## 2017-07-11 PROCEDURE — 82803 BLOOD GASES ANY COMBINATION: CPT

## 2017-07-11 PROCEDURE — 63600175 PHARM REV CODE 636 W HCPCS: Performed by: EMERGENCY MEDICINE

## 2017-07-11 PROCEDURE — 96365 THER/PROPH/DIAG IV INF INIT: CPT

## 2017-07-11 PROCEDURE — 80053 COMPREHEN METABOLIC PANEL: CPT

## 2017-07-11 PROCEDURE — 82040 ASSAY OF SERUM ALBUMIN: CPT

## 2017-07-11 PROCEDURE — 85025 COMPLETE CBC W/AUTO DIFF WBC: CPT

## 2017-07-11 PROCEDURE — 81003 URINALYSIS AUTO W/O SCOPE: CPT

## 2017-07-11 RX ORDER — ONDANSETRON 8 MG/1
8 TABLET, ORALLY DISINTEGRATING ORAL EVERY 6 HOURS PRN
Qty: 30 TABLET | Refills: 0 | Status: ON HOLD | OUTPATIENT
Start: 2017-07-11 | End: 2017-07-25

## 2017-07-11 RX ORDER — ONDANSETRON 2 MG/ML
4 INJECTION INTRAMUSCULAR; INTRAVENOUS
Status: COMPLETED | OUTPATIENT
Start: 2017-07-11 | End: 2017-07-11

## 2017-07-11 RX ORDER — MORPHINE SULFATE 2 MG/ML
4 INJECTION, SOLUTION INTRAMUSCULAR; INTRAVENOUS
Status: COMPLETED | OUTPATIENT
Start: 2017-07-11 | End: 2017-07-11

## 2017-07-11 RX ORDER — PROMETHAZINE HYDROCHLORIDE 25 MG/1
25 SUPPOSITORY RECTAL EVERY 6 HOURS PRN
Qty: 12 SUPPOSITORY | Refills: 0 | Status: ON HOLD | OUTPATIENT
Start: 2017-07-11 | End: 2017-07-25 | Stop reason: HOSPADM

## 2017-07-11 RX ORDER — METOPROLOL TARTRATE 25 MG/1
25 TABLET, FILM COATED ORAL
Status: COMPLETED | OUTPATIENT
Start: 2017-07-11 | End: 2017-07-11

## 2017-07-11 RX ORDER — CHLORPROMAZINE HYDROCHLORIDE 25 MG/ML
25 INJECTION INTRAMUSCULAR
Status: COMPLETED | OUTPATIENT
Start: 2017-07-11 | End: 2017-07-11

## 2017-07-11 RX ORDER — METRONIDAZOLE 500 MG/100ML
500 INJECTION, SOLUTION INTRAVENOUS
Status: COMPLETED | OUTPATIENT
Start: 2017-07-11 | End: 2017-07-11

## 2017-07-11 RX ORDER — CIPROFLOXACIN 2 MG/ML
400 INJECTION, SOLUTION INTRAVENOUS
Status: COMPLETED | OUTPATIENT
Start: 2017-07-11 | End: 2017-07-11

## 2017-07-11 RX ORDER — FUROSEMIDE 10 MG/ML
40 INJECTION INTRAMUSCULAR; INTRAVENOUS
Status: DISCONTINUED | OUTPATIENT
Start: 2017-07-11 | End: 2017-07-11

## 2017-07-11 RX ORDER — ASPIRIN 325 MG
325 TABLET ORAL
Status: DISCONTINUED | OUTPATIENT
Start: 2017-07-11 | End: 2017-07-11

## 2017-07-11 RX ORDER — CIPROFLOXACIN 500 MG/1
500 TABLET ORAL 2 TIMES DAILY
Qty: 20 TABLET | Refills: 0 | Status: ON HOLD | OUTPATIENT
Start: 2017-07-11 | End: 2017-07-25 | Stop reason: HOSPADM

## 2017-07-11 RX ORDER — HYDROCODONE BITARTRATE AND ACETAMINOPHEN 5; 325 MG/1; MG/1
1 TABLET ORAL EVERY 8 HOURS PRN
Qty: 15 TABLET | Refills: 0 | Status: ON HOLD | OUTPATIENT
Start: 2017-07-11 | End: 2017-07-25

## 2017-07-11 RX ORDER — LISINOPRIL 20 MG/1
20 TABLET ORAL
Status: COMPLETED | OUTPATIENT
Start: 2017-07-11 | End: 2017-07-11

## 2017-07-11 RX ORDER — METRONIDAZOLE 500 MG/1
500 TABLET ORAL EVERY 6 HOURS
Qty: 40 TABLET | Refills: 0 | Status: ON HOLD | OUTPATIENT
Start: 2017-07-11 | End: 2017-07-25 | Stop reason: HOSPADM

## 2017-07-11 RX ADMIN — METOPROLOL TARTRATE 25 MG: 25 TABLET ORAL at 06:07

## 2017-07-11 RX ADMIN — CIPROFLOXACIN 400 MG: 2 INJECTION, SOLUTION INTRAVENOUS at 06:07

## 2017-07-11 RX ADMIN — MORPHINE SULFATE 4 MG: 2 INJECTION, SOLUTION INTRAMUSCULAR; INTRAVENOUS at 03:07

## 2017-07-11 RX ADMIN — METRONIDAZOLE 500 MG: 500 INJECTION, SOLUTION INTRAVENOUS at 05:07

## 2017-07-11 RX ADMIN — LISINOPRIL 20 MG: 20 TABLET ORAL at 06:07

## 2017-07-11 RX ADMIN — CHLORPROMAZINE HYDROCHLORIDE 25 MG: 25 INJECTION INTRAMUSCULAR at 06:07

## 2017-07-11 RX ADMIN — ONDANSETRON 4 MG: 2 INJECTION INTRAMUSCULAR; INTRAVENOUS at 03:07

## 2017-07-11 RX ADMIN — SODIUM CHLORIDE 500 ML: 0.9 INJECTION, SOLUTION INTRAVENOUS at 03:07

## 2017-07-11 NOTE — ED NOTES
Upon talking with pt he states when he begins feeling bad he takes Keflex PRN / nurse attempted talking to pt about indiscriminate use of antibiotics but was cut off by patient stating he knows what he is doing

## 2017-07-11 NOTE — ED NOTES
Pt given medication as ordered and asking if he got any anti-biotics / pt explained lets get the lab results and then Dr saravia will speak with him and his wife

## 2017-07-11 NOTE — ED PROVIDER NOTES
Encounter Date: 7/11/2017       History     Chief Complaint   Patient presents with    Abdominal Pain     reports diarrhea two days ago, vomiting      Rolo Blanca is a 77 y.o. male who presents to the Emergency Department with   nausea, vomiting, abdominal pain with diarrhea for the last 3 days.  Patient states he first noted*diarrhea over the weekend now he is nauseated and vomiting.  Patient states he has not males keep his blood pressure meds down for last 3 days each time he takes them he vomits them up.  Patient states he's ALLERGIC to his wife.      The history is provided by the patient.   Abdominal Cramping   The primary symptoms of the illness include abdominal pain, nausea, vomiting and diarrhea. The primary symptoms of the illness do not include fever, shortness of breath or dysuria. The current episode started several days ago. The onset of the illness was gradual. The problem has been gradually worsening.   The abdominal pain began more than 2 days ago. The pain came on gradually. The abdominal pain has been gradually worsening since its onset. The abdominal pain is generalized. The abdominal pain does not radiate. The severity of the abdominal pain is 7/10. The abdominal pain is relieved by nothing. The abdominal pain is exacerbated by eating.   Nausea began 3 to 5 days ago. The nausea is associated with eating.   The vomiting began more than 2 days ago. The emesis contains stomach contents.   The diarrhea began 3 to 5 days ago. The diarrhea is watery.   Symptoms associated with the illness do not include back pain. Significant associated medical issues include cardiac disease. Significant associated medical issues do not include diabetes.     Review of patient's allergies indicates:  No Known Allergies  Past Medical History:   Diagnosis Date    MI, old 2014     Past Surgical History:   Procedure Laterality Date    AORTIC VALVE REPLACEMENT  2014    CARDIAC SURGERY      CARDIAC VALVE SURGERY       Bio AVR 2014    COLON SURGERY  2000    CORONARY ARTERY BYPASS GRAFT  2014    x2    TONSILLECTOMY       History reviewed. No pertinent family history.  Social History   Substance Use Topics    Smoking status: Never Smoker    Smokeless tobacco: Never Used    Alcohol use Yes      Comment: 2-3 X'S A WEEK     Review of Systems   Constitutional: Negative for fever.   HENT: Negative for sore throat.    Respiratory: Negative for shortness of breath.    Cardiovascular: Negative for chest pain.   Gastrointestinal: Positive for abdominal pain, diarrhea, nausea and vomiting.   Genitourinary: Positive for decreased urine volume. Negative for dysuria.   Musculoskeletal: Negative for back pain.   Skin: Negative for rash.   Neurological: Negative for weakness.   Hematological: Does not bruise/bleed easily.   All other systems reviewed and are negative.      Physical Exam     Initial Vitals   BP Pulse Resp Temp SpO2   07/11/17 1513 07/11/17 1510 07/11/17 1510 07/11/17 1510 07/11/17 1510   (!) 168/95 93 18 99.5 °F (37.5 °C) 99 %      MAP       07/11/17 1513       119.33         Physical Exam    Nursing note and vitals reviewed.  Constitutional: He appears well-developed and well-nourished. He is not diaphoretic. He appears distressed.   HENT:   Head: Normocephalic and atraumatic.   Right Ear: External ear normal.   Left Ear: External ear normal.   Nose: Nose normal.   Mouth/Throat: Oropharynx is clear and moist.   Eyes: EOM are normal. Pupils are equal, round, and reactive to light. Right eye exhibits no discharge. Left eye exhibits no discharge.   Neck: Normal range of motion. Neck supple. No tracheal deviation present. JVD present.   Cardiovascular: Normal rate, regular rhythm, normal heart sounds and intact distal pulses. Exam reveals no gallop and no friction rub.    No murmur heard.  Pulmonary/Chest: Breath sounds normal. No respiratory distress. He has no wheezes. He has no rhonchi. He has no rales. He exhibits no  tenderness.   Abdominal: Soft. Bowel sounds are normal. He exhibits distension. He exhibits no mass. There is tenderness. There is no rebound and no guarding.   Musculoskeletal: Normal range of motion.   Neurological: He is alert and oriented to person, place, and time. He displays normal reflexes. No cranial nerve deficit or sensory deficit.   Skin: Skin is warm and dry. Capillary refill takes less than 2 seconds. No rash noted. No pallor.   Psychiatric: He has a normal mood and affect.         ED Course   Procedures  Labs Reviewed   POCT URINALYSIS W/O SCOPE - Abnormal; Notable for the following:        Result Value    Glucose, UA Negative (*)     Bilirubin, UA Negative (*)     Ketones, UA Negative (*)     Blood, UA Trace-lysed (*)     Protein, UA 3+ (*)     Nitrite, UA Negative (*)     Leukocytes, UA Negative (*)     All other components within normal limits   POCT CMP - Abnormal; Notable for the following:     Alkaline Phosphatase, POC 27 (*)     POC Chloride 96 (*)     POC Creatinine 1.3 (*)     POC Glucose 149 (*)     All other components within normal limits   POCT B-TYPE NATRIURETIC PEPTIDE (BNP) - Abnormal; Notable for the following:     POC B-Type Natriuretic Peptide 1330 (*)     All other components within normal limits   ISTAT PROCEDURE - Abnormal; Notable for the following:     POC PO2 34 (*)     POC HCO3 28.7 (*)     POC SATURATED O2 67 (*)     POC TCO2 30 (*)     All other components within normal limits   CULTURE, BLOOD   CULTURE, BLOOD   TROPONIN ISTAT   POCT CBC   POCT URINALYSIS W/O SCOPE   POCT CMP   POCT PROTIME-INR   POCT B-TYPE NATRIURETIC PEPTIDE (BNP)   POCT TROPONIN   POCT B-TYPE NATRIURETIC PEPTIDE (BNP)   ISTAT PROCEDURE                               ED Course       Medical decision making   Chief complaint: Nausea, vomiting, diarrhea and left lower quadrant abdominal pain  Differential diagnosis: Gastritis, gastroenteritis, diverticular disease, diverticulitis, urinary tract  infection.  Treatment in the ED Physical Exam, Zofran for nausea, IV fluids.  Patient reports total resolution of symptoms after medication.    Discussed labs, and imaging results.    Fill and take prescriptions as directed.  Return to the ED if symptoms worsen or do not resolve.   Answered questions and discussed discharge plan.    Patient feels much better and is ready for discharge.  Follow up with PCP in 1 days.    Clinical Impression:   The primary encounter diagnosis was Diverticulitis of intestine, unspecified bleeding status, unspecified complication status, unspecified part of intestinal tract. A diagnosis of Abdominal pain, unspecified location was also pertinent to this visit.                           Freda Duran DO  07/11/17 1910

## 2017-07-12 NOTE — ED NOTES
Report completed at this time; report received from NOAH Armenta; pt's discharge delayed, awaiting completion of IV antibiotic infusing

## 2017-07-12 NOTE — ED NOTES
D/c'd with NADN: no complaints voiced; denies any needs; d/c education performed; pt stated understanding; IV removed with tip intact; steady gait OOED

## 2017-07-13 ENCOUNTER — HOSPITAL ENCOUNTER (INPATIENT)
Facility: HOSPITAL | Age: 77
LOS: 12 days | Discharge: NURSING, REHAB OR LONG-TERM CARE | DRG: 288 | End: 2017-07-25
Attending: HOSPITALIST | Admitting: HOSPITALIST
Payer: COMMERCIAL

## 2017-07-13 ENCOUNTER — TELEPHONE (OUTPATIENT)
Dept: INFECTIOUS DISEASES | Facility: HOSPITAL | Age: 77
End: 2017-07-13

## 2017-07-13 ENCOUNTER — HOSPITAL ENCOUNTER (EMERGENCY)
Facility: OTHER | Age: 77
Discharge: ANOTHER HOSPITAL | End: 2017-07-13
Attending: EMERGENCY MEDICINE
Payer: COMMERCIAL

## 2017-07-13 ENCOUNTER — TELEPHONE (OUTPATIENT)
Dept: EMERGENCY MEDICINE | Facility: OTHER | Age: 77
End: 2017-07-13

## 2017-07-13 VITALS
HEART RATE: 78 BPM | HEIGHT: 68 IN | SYSTOLIC BLOOD PRESSURE: 131 MMHG | WEIGHT: 210 LBS | DIASTOLIC BLOOD PRESSURE: 61 MMHG | OXYGEN SATURATION: 98 % | TEMPERATURE: 100 F | BODY MASS INDEX: 31.83 KG/M2 | RESPIRATION RATE: 18 BRPM

## 2017-07-13 DIAGNOSIS — R55 POSTURAL DIZZINESS WITH NEAR SYNCOPE: Primary | ICD-10-CM

## 2017-07-13 DIAGNOSIS — R78.81 POSITIVE BLOOD CULTURES: ICD-10-CM

## 2017-07-13 DIAGNOSIS — K57.92 DIVERTICULITIS OF INTESTINE, UNSPECIFIED BLEEDING STATUS, UNSPECIFIED COMPLICATION STATUS, UNSPECIFIED PART OF INTESTINAL TRACT: ICD-10-CM

## 2017-07-13 DIAGNOSIS — A41.9 SEPSIS, DUE TO UNSPECIFIED ORGANISM: ICD-10-CM

## 2017-07-13 DIAGNOSIS — N17.9 AKI (ACUTE KIDNEY INJURY): ICD-10-CM

## 2017-07-13 DIAGNOSIS — R42 POSTURAL DIZZINESS WITH NEAR SYNCOPE: Primary | ICD-10-CM

## 2017-07-13 DIAGNOSIS — I38 ENDOCARDITIS: ICD-10-CM

## 2017-07-13 DIAGNOSIS — I05.9 ENDOCARDITIS OF MITRAL VALVE: Primary | ICD-10-CM

## 2017-07-13 PROBLEM — K57.32 DIVERTICULITIS OF LARGE INTESTINE WITHOUT BLEEDING: Status: ACTIVE | Noted: 2017-07-13

## 2017-07-13 LAB
ALBUMIN SERPL BCP-MCNC: 2.9 G/DL
ALBUMIN SERPL-MCNC: 3.3 G/DL (ref 3.3–5.5)
ALBUMIN SERPL-MCNC: 3.3 G/DL (ref 3.3–5.5)
ALP SERPL-CCNC: 19 U/L (ref 42–141)
ALP SERPL-CCNC: 21 U/L (ref 42–141)
ALP SERPL-CCNC: 26 U/L
ALT SERPL W/O P-5'-P-CCNC: 16 U/L
ANION GAP SERPL CALC-SCNC: 9 MMOL/L
AST SERPL-CCNC: 24 U/L
BASOPHILS # BLD AUTO: 0.02 K/UL
BASOPHILS NFR BLD: 0.1 %
BILIRUB SERPL-MCNC: 1.2 MG/DL (ref 0.2–1.6)
BILIRUB SERPL-MCNC: 1.2 MG/DL (ref 0.2–1.6)
BILIRUB SERPL-MCNC: 1.3 MG/DL
BUN SERPL-MCNC: 21 MG/DL
BUN SERPL-MCNC: 23 MG/DL (ref 7–22)
CALCIUM SERPL-MCNC: 8.8 MG/DL
CALCIUM SERPL-MCNC: 8.9 MG/DL (ref 8–10.3)
CHLORIDE SERPL-SCNC: 103 MMOL/L
CHLORIDE SERPL-SCNC: 95 MMOL/L (ref 98–108)
CO2 SERPL-SCNC: 22 MMOL/L
CREAT SERPL-MCNC: 1.5 MG/DL
CREAT SERPL-MCNC: 1.8 MG/DL (ref 0.6–1.2)
DIFFERENTIAL METHOD: ABNORMAL
EOSINOPHIL # BLD AUTO: 0.5 K/UL
EOSINOPHIL NFR BLD: 2.8 %
ERYTHROCYTE [DISTWIDTH] IN BLOOD BY AUTOMATED COUNT: 15.5 %
EST. GFR  (AFRICAN AMERICAN): 51 ML/MIN/1.73 M^2
EST. GFR  (NON AFRICAN AMERICAN): 44 ML/MIN/1.73 M^2
GLUCOSE SERPL-MCNC: 109 MG/DL
GLUCOSE SERPL-MCNC: 178 MG/DL (ref 73–118)
HCO3 UR-SCNC: 23.5 MMOL/L (ref 24–28)
HCT VFR BLD AUTO: 34.2 %
HGB BLD-MCNC: 11 G/DL
LDH SERPL L TO P-CCNC: 1.79 MMOL/L (ref 0.5–2.2)
LYMPHOCYTES # BLD AUTO: 1.4 K/UL
LYMPHOCYTES NFR BLD: 8.4 %
MAGNESIUM SERPL-MCNC: 2 MG/DL
MCH RBC QN AUTO: 26.8 PG
MCHC RBC AUTO-ENTMCNC: 32.2 %
MCV RBC AUTO: 83 FL
MONOCYTES # BLD AUTO: 1.8 K/UL
MONOCYTES NFR BLD: 11.5 %
NEUTROPHILS # BLD AUTO: 12.3 K/UL
NEUTROPHILS NFR BLD: 77.2 %
PCO2 BLDA: 40.6 MMHG (ref 35–45)
PH SMN: 7.37 [PH] (ref 7.35–7.45)
PHOSPHATE SERPL-MCNC: 3 MG/DL
PLATELET # BLD AUTO: 206 K/UL
PMV BLD AUTO: 10.3 FL
PO2 BLDA: 26 MMHG (ref 40–60)
POC ALT (SGPT): 11 U/L (ref 10–47)
POC ALT (SGPT): 15 U/L (ref 10–47)
POC AMYLASE: 44 U/L (ref 14–97)
POC AST (SGOT): 29 U/L (ref 11–38)
POC AST (SGOT): 31 U/L (ref 11–38)
POC B-TYPE NATRIURETIC PEPTIDE: 375 PG/ML (ref 0–100)
POC BE: -2 MMOL/L
POC CARDIAC TROPONIN I: 0.02 NG/ML
POC GGT: 15 U/L (ref 5–65)
POC PTINR: 1.1 (ref 0.9–1.2)
POC PTWBT: 13.1 SEC (ref 9.7–14.3)
POC SATURATED O2: 47 % (ref 95–100)
POC TCO2: 22 MMOL/L (ref 18–33)
POC TCO2: 25 MMOL/L (ref 24–29)
POTASSIUM BLD-SCNC: 4.1 MMOL/L (ref 3.6–5.1)
POTASSIUM SERPL-SCNC: 4 MMOL/L
PROT SERPL-MCNC: 6.5 G/DL
PROTEIN, POC: 6.5 G/DL (ref 6.4–8.1)
PROTEIN, POC: 6.5 G/DL (ref 6.4–8.1)
RBC # BLD AUTO: 4.1 M/UL
SAMPLE: ABNORMAL
SAMPLE: NORMAL
SAMPLE: NORMAL
SODIUM BLD-SCNC: 130 MMOL/L (ref 128–145)
SODIUM SERPL-SCNC: 134 MMOL/L
WBC # BLD AUTO: 15.99 K/UL

## 2017-07-13 PROCEDURE — 85610 PROTHROMBIN TIME: CPT

## 2017-07-13 PROCEDURE — 85025 COMPLETE CBC W/AUTO DIFF WBC: CPT

## 2017-07-13 PROCEDURE — 63600175 PHARM REV CODE 636 W HCPCS: Performed by: NURSE PRACTITIONER

## 2017-07-13 PROCEDURE — 12000002 HC ACUTE/MED SURGE SEMI-PRIVATE ROOM

## 2017-07-13 PROCEDURE — 83880 ASSAY OF NATRIURETIC PEPTIDE: CPT

## 2017-07-13 PROCEDURE — 83735 ASSAY OF MAGNESIUM: CPT

## 2017-07-13 PROCEDURE — 99291 CRITICAL CARE FIRST HOUR: CPT | Mod: 25

## 2017-07-13 PROCEDURE — 93005 ELECTROCARDIOGRAM TRACING: CPT

## 2017-07-13 PROCEDURE — 84100 ASSAY OF PHOSPHORUS: CPT

## 2017-07-13 PROCEDURE — 96375 TX/PRO/DX INJ NEW DRUG ADDON: CPT

## 2017-07-13 PROCEDURE — 96366 THER/PROPH/DIAG IV INF ADDON: CPT

## 2017-07-13 PROCEDURE — 36415 COLL VENOUS BLD VENIPUNCTURE: CPT

## 2017-07-13 PROCEDURE — 80053 COMPREHEN METABOLIC PANEL: CPT

## 2017-07-13 PROCEDURE — 93010 ELECTROCARDIOGRAM REPORT: CPT | Mod: ,,, | Performed by: INTERNAL MEDICINE

## 2017-07-13 PROCEDURE — 82803 BLOOD GASES ANY COMBINATION: CPT

## 2017-07-13 PROCEDURE — 87040 BLOOD CULTURE FOR BACTERIA: CPT

## 2017-07-13 PROCEDURE — 25000003 PHARM REV CODE 250: Performed by: NURSE PRACTITIONER

## 2017-07-13 PROCEDURE — 25000003 PHARM REV CODE 250: Performed by: EMERGENCY MEDICINE

## 2017-07-13 PROCEDURE — 96365 THER/PROPH/DIAG IV INF INIT: CPT

## 2017-07-13 PROCEDURE — 63600175 PHARM REV CODE 636 W HCPCS: Performed by: EMERGENCY MEDICINE

## 2017-07-13 PROCEDURE — 96368 THER/DIAG CONCURRENT INF: CPT

## 2017-07-13 PROCEDURE — 82040 ASSAY OF SERUM ALBUMIN: CPT

## 2017-07-13 PROCEDURE — 84484 ASSAY OF TROPONIN QUANT: CPT

## 2017-07-13 RX ORDER — ACETAMINOPHEN 325 MG/1
650 TABLET ORAL EVERY 6 HOURS PRN
Status: DISCONTINUED | OUTPATIENT
Start: 2017-07-13 | End: 2017-07-25 | Stop reason: HOSPADM

## 2017-07-13 RX ORDER — CEPHALEXIN 500 MG/1
500 CAPSULE ORAL EVERY 12 HOURS
Status: DISCONTINUED | OUTPATIENT
Start: 2017-07-13 | End: 2017-07-13

## 2017-07-13 RX ORDER — HYDRALAZINE HYDROCHLORIDE 20 MG/ML
10 INJECTION INTRAMUSCULAR; INTRAVENOUS EVERY 6 HOURS PRN
Status: DISCONTINUED | OUTPATIENT
Start: 2017-07-13 | End: 2017-07-25 | Stop reason: HOSPADM

## 2017-07-13 RX ORDER — CIPROFLOXACIN 2 MG/ML
400 INJECTION, SOLUTION INTRAVENOUS
Status: DISCONTINUED | OUTPATIENT
Start: 2017-07-14 | End: 2017-07-14

## 2017-07-13 RX ORDER — ASPIRIN 325 MG
325 TABLET ORAL
Status: COMPLETED | OUTPATIENT
Start: 2017-07-13 | End: 2017-07-13

## 2017-07-13 RX ORDER — METRONIDAZOLE 500 MG/100ML
500 INJECTION, SOLUTION INTRAVENOUS
Status: DISCONTINUED | OUTPATIENT
Start: 2017-07-13 | End: 2017-07-14

## 2017-07-13 RX ORDER — BUPROPION HYDROCHLORIDE 150 MG/1
300 TABLET ORAL DAILY
Status: DISCONTINUED | OUTPATIENT
Start: 2017-07-13 | End: 2017-07-24

## 2017-07-13 RX ORDER — CIPROFLOXACIN 2 MG/ML
400 INJECTION, SOLUTION INTRAVENOUS
Status: COMPLETED | OUTPATIENT
Start: 2017-07-13 | End: 2017-07-13

## 2017-07-13 RX ORDER — METOPROLOL SUCCINATE 25 MG/1
25 TABLET, EXTENDED RELEASE ORAL DAILY
Status: DISCONTINUED | OUTPATIENT
Start: 2017-07-14 | End: 2017-07-24

## 2017-07-13 RX ORDER — ONDANSETRON 8 MG/1
8 TABLET, ORALLY DISINTEGRATING ORAL EVERY 8 HOURS PRN
Status: DISCONTINUED | OUTPATIENT
Start: 2017-07-13 | End: 2017-07-25 | Stop reason: HOSPADM

## 2017-07-13 RX ORDER — SERTRALINE HYDROCHLORIDE 50 MG/1
50 TABLET, FILM COATED ORAL DAILY
Status: DISCONTINUED | OUTPATIENT
Start: 2017-07-14 | End: 2017-07-21

## 2017-07-13 RX ORDER — ONDANSETRON 2 MG/ML
8 INJECTION INTRAMUSCULAR; INTRAVENOUS
Status: COMPLETED | OUTPATIENT
Start: 2017-07-13 | End: 2017-07-13

## 2017-07-13 RX ORDER — ASPIRIN 81 MG/1
81 TABLET ORAL DAILY
Status: DISCONTINUED | OUTPATIENT
Start: 2017-07-14 | End: 2017-07-25 | Stop reason: HOSPADM

## 2017-07-13 RX ORDER — LEVOTHYROXINE SODIUM 75 UG/1
75 TABLET ORAL
Status: DISCONTINUED | OUTPATIENT
Start: 2017-07-14 | End: 2017-07-25 | Stop reason: HOSPADM

## 2017-07-13 RX ORDER — LISINOPRIL 20 MG/1
20 TABLET ORAL DAILY
Status: DISCONTINUED | OUTPATIENT
Start: 2017-07-14 | End: 2017-07-19

## 2017-07-13 RX ORDER — ATORVASTATIN CALCIUM 10 MG/1
10 TABLET, FILM COATED ORAL DAILY
Status: DISCONTINUED | OUTPATIENT
Start: 2017-07-14 | End: 2017-07-25 | Stop reason: HOSPADM

## 2017-07-13 RX ADMIN — PIPERACILLIN SODIUM AND TAZOBACTAM SODIUM 4.5 G: 4; .5 INJECTION, POWDER, FOR SOLUTION INTRAVENOUS at 02:07

## 2017-07-13 RX ADMIN — SODIUM CHLORIDE 1000 ML: 0.9 INJECTION, SOLUTION INTRAVENOUS at 01:07

## 2017-07-13 RX ADMIN — ONDANSETRON 8 MG: 2 INJECTION INTRAMUSCULAR; INTRAVENOUS at 12:07

## 2017-07-13 RX ADMIN — ASPIRIN 325 MG ORAL TABLET 325 MG: 325 PILL ORAL at 06:07

## 2017-07-13 RX ADMIN — HYDRALAZINE HYDROCHLORIDE 10 MG: 20 INJECTION INTRAMUSCULAR; INTRAVENOUS at 06:07

## 2017-07-13 RX ADMIN — CEFTRIAXONE 2 G: 2 INJECTION, SOLUTION INTRAVENOUS at 08:07

## 2017-07-13 RX ADMIN — VANCOMYCIN HYDROCHLORIDE 1 G: 1 INJECTION, POWDER, LYOPHILIZED, FOR SOLUTION INTRAVENOUS at 02:07

## 2017-07-13 RX ADMIN — CIPROFLOXACIN 400 MG: 2 INJECTION, SOLUTION INTRAVENOUS at 02:07

## 2017-07-13 RX ADMIN — METRONIDAZOLE 500 MG: 500 INJECTION, SOLUTION INTRAVENOUS at 06:07

## 2017-07-13 NOTE — ASSESSMENT & PLAN NOTE
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 Per ID documentation 12/2015 [facility] KHALIDA showed vegetation on aortic and mitral valve.     Per their discharge summary, cultures had cleared at time of discharge on 11/27.  1/25/16 TTE shows mobile mass on mitral valve and resolved vegetation on aortic valve. Patient was treated with IV rocephin x 6 weeks then placed on suppressive antibiotic Keflex and plan was for repeat 2 D echo in Jan 2017 however patient have missed appointment with ID. Obtain 2 D echo and antibiotics as above and inflammatory markers.

## 2017-07-13 NOTE — ED NOTES
"Patient has verified the spelling of their name and  on armband    LOC: The patient is awake, alert, and aware of environment with an appropriate affect, the patient is oriented x 4 and speaking appropriately.     APPEARANCE: Patient resting comfortably and in no acute distress.      RESPIRATORY: Airway is open and patent, Denies SOB.    CARDIAC:  No chest pain.     NEUROLOGICAL: +Unstead gait    MUSCULOSKELETAL: +Generalized weakness.     COMPLAINT: Patient presented to the ED for +blood cultures. Patient also complain of "feeling ill."        "

## 2017-07-13 NOTE — TELEPHONE ENCOUNTER
Received phone call from patient advising that he went to ED last night with abdominal pain, n/v/d.  Was discharged but told to return as he has positive blood cultures.    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    Multiple missed appointments though he reports he has continued to take suppressive Keflex.     Stressed importance that he return to ED now as instructed.

## 2017-07-13 NOTE — HPI
The patient is a 76 y/o male with a significant history of colon cancer 2000 sp partial colon resection, hypertension, hyperlipidemia, TIAs, CAD s/p MI/CABG 2014, AVR- bioprosthetic valve and AVR and MV endocarditis 2015 now on suppressive antibiotics (currently followed by [place]) who was told by [place] [facility] to return to ED for 7/11positive blood cultures 2/4 (one bottle GPC chain strep, one bottle GPC cluster staph). Patient then transferred to [facility] for further evaluation. Patient initially went to [place] ED on 7/11 with complaints of abdominal pain, nausea, and vomiting which started 3 days ago then found to have on CT abdomen  sigmoid diverticulitis then treated with cipro and metronidazole. Patient reported today abdominal pain and vomiting resolved and had a normal formed bowel movement. Last episode of nausea and vomiting was last night. Also c/o intermittent dizziness upon position changes but denies chest pain or  shortness of breath. He denies fever, chills, URI symptoms and headaches.     Family concern about suppressive antibiotic Keflex for two years after endocarditis episode. Patient missed ID appointment as plan was for repeat 2 D echo in Jan 2017 to evaluate for resolved vegetation on MV).

## 2017-07-13 NOTE — SUBJECTIVE & OBJECTIVE
Past Medical History:   Diagnosis Date    MI, old 2014       Past Surgical History:   Procedure Laterality Date    AORTIC VALVE REPLACEMENT  2014    CARDIAC SURGERY      CARDIAC VALVE SURGERY      Bio AVR 2014    COLON SURGERY  2000    CORONARY ARTERY BYPASS GRAFT  2014    x2    TONSILLECTOMY         Review of patient's allergies indicates:  No Known Allergies    Current Facility-Administered Medications on File Prior to Encounter   Medication    [COMPLETED] aspirin tablet 325 mg    [COMPLETED] ciprofloxacin (CIPRO)400mg/200ml D5W IVPB 400 mg    [COMPLETED] ondansetron injection 8 mg    [COMPLETED] piperacillin-tazobactam 4.5 g in dextrose 5 % 100 mL IVPB (ready to mix system)    [COMPLETED] sodium chloride 0.9% bolus 1,000 mL    [COMPLETED] vancomycin 1 g in dextrose 5 % 250 mL IVPB (ready to mix system)     Current Outpatient Prescriptions on File Prior to Encounter   Medication Sig    aspirin (ECOTRIN) 81 MG EC tablet Take 1 tablet (81 mg total) by mouth once daily.    atorvastatin (LIPITOR) 10 MG tablet TAKE ONE TABLET BY MOUTH EVERY DAY    buPROPion (WELLBUTRIN XL) 300 MG 24 hr tablet Take 1 tablet (300 mg total) by mouth once daily.    cephALEXin (KEFLEX) 500 MG capsule TAKE ONE CAPSULE BY MOUTH EVERY TWELVE HOURS    ciprofloxacin HCl (CIPRO) 500 MG tablet Take 1 tablet (500 mg total) by mouth 2 (two) times daily.    diazepam (VALIUM) 2 MG tablet     hydrocodone-acetaminophen 5-325mg (NORCO) 5-325 mg per tablet Take 1 tablet by mouth every 8 (eight) hours as needed for Pain (As needed for severe 10 out of 10 pain).    levothyroxine (SYNTHROID) 75 MCG tablet TAKE ONE TABLET BY MOUTH EVERY DAY BEFORE BREAKFAST    lisinopril (PRINIVIL,ZESTRIL) 20 MG tablet Take 2 tablets by mouth daily (Patient taking differently: 20 mg once daily. Take 2 tablets by mouth daily)    metoprolol succinate (TOPROL-XL) 25 MG 24 hr tablet Take 1 tablet (25 mg total) by mouth once daily.    metronidazole (FLAGYL)  500 MG tablet Take 1 tablet (500 mg total) by mouth every 6 (six) hours.    nystatin-triamcinolone (MYCOLOG II) cream     ondansetron (ZOFRAN-ODT) 8 MG TbDL Take 1 tablet (8 mg total) by mouth every 6 (six) hours as needed.    promethazine (PHENERGAN) 25 MG suppository Place 1 suppository (25 mg total) rectally every 6 (six) hours as needed (Use if vomiting is not controlled with oral medication).    sertraline (ZOLOFT) 50 MG tablet Take 1 tablet (50 mg total) by mouth once daily.    triamcinolone acetonide 0.1% (KENALOG) 0.1 % cream      Family History     None        Social History Main Topics    Smoking status: Never Smoker    Smokeless tobacco: Never Used    Alcohol use Yes      Comment: 2-3 X'S A WEEK    Drug use: No    Sexual activity: Not on file     Review of Systems   Constitutional: Negative for appetite change and chills.   HENT: Negative for congestion, ear discharge and rhinorrhea.    Eyes: Negative for pain, redness and itching.   Respiratory: Positive for shortness of breath. Negative for cough, chest tightness, wheezing and stridor.    Cardiovascular: Negative for chest pain and leg swelling.   Gastrointestinal: Positive for abdominal pain. Negative for constipation, diarrhea, nausea and vomiting.   Endocrine: Negative for cold intolerance and polydipsia.   Genitourinary: Negative for dysuria and hematuria.   Musculoskeletal: Negative for arthralgias, joint swelling and myalgias.   Skin: Negative for color change and pallor.   Neurological: Positive for light-headedness. Negative for syncope and headaches.   Psychiatric/Behavioral: Negative for confusion and hallucinations. The patient is nervous/anxious.      Objective:     Vital Signs (Most Recent):  Temp: 98.5 °F (36.9 °C) (07/13/17 1700)  Pulse: 85 (07/13/17 1700)  Resp: 18 (07/13/17 1700)  BP: (!) 184/82 (manual) (07/13/17 1725)  SpO2: 98 % (07/13/17 1700) Vital Signs (24h Range):  Temp:  [98.5 °F (36.9 °C)-99.5 °F (37.5 °C)] 98.5 °F  (36.9 °C)  Pulse:  [77-94] 85  Resp:  [18-22] 18  SpO2:  [94 %-98 %] 98 %  BP: (123-185)/(55-84) 184/82        There is no height or weight on file to calculate BMI.    Physical Exam   Constitutional: He is oriented to person, place, and time. He appears well-developed and well-nourished. No distress.   HENT:   Head: Normocephalic and atraumatic.   Right Ear: External ear normal.   Left Ear: External ear normal.   Eyes: Conjunctivae and EOM are normal. Pupils are equal, round, and reactive to light.   Neck: Normal range of motion. Neck supple.   Cardiovascular: Normal rate and regular rhythm.    Murmur heard.   Systolic murmur is present   Pulmonary/Chest: Effort normal and breath sounds normal. He has no wheezes. He has no rales.   Abdominal: Soft. Bowel sounds are normal. There is tenderness in the left lower quadrant. There is no guarding.   Musculoskeletal: Normal range of motion.   Neurological: He is alert and oriented to person, place, and time. No cranial nerve deficit.   Skin: Skin is warm. He is not diaphoretic.   Psychiatric: He has a normal mood and affect. His behavior is normal. Judgment and thought content normal.   Vitals reviewed.       Significant Labs: Blood Culture: No results for input(s): LABBLOO in the last 48 hours.  BMP: No results for input(s): GLU, NA, K, CL, CO2, BUN, CREATININE, CALCIUM, MG in the last 48 hours.  CBC: No results for input(s): WBC, HGB, HCT, PLT in the last 48 hours.  Cardiac Markers: No results for input(s): CKMB, MYOGLOBIN, BNP, TROPISTAT in the last 48 hours.  Lactic Acid: No results for input(s): LACTATE in the last 48 hours.  Lipid Panel: No results for input(s): CHOL, HDL, LDLCALC, TRIG, CHOLHDL in the last 48 hours.  Magnesium: No results for input(s): MG in the last 48 hours.    Significant Imaging: CXR showed mild cardiomegaly, suggestive of CHF.

## 2017-07-13 NOTE — ASSESSMENT & PLAN NOTE
Pt not compliant with current statin regiment. Continue and encourage daily statin intake.   Lab Results   Component Value Date    LDLCALC 65.2 06/17/2017

## 2017-07-13 NOTE — NURSING
Pt received from [facility]. Pt denies any complaints. Pt in no  acuate distress. B/P elevated. MD notified. Pt informed on admission process. Pt oriented to room and call bell. Bed alarm activated for safety measures. Full assessment per documentation and flow sheet.

## 2017-07-13 NOTE — NURSING
Call from Bria Mejia in microbiology, pt has gram + cocci and clusters resembling strep in the Aerobic and anaerobic specimen collected 7/11/2017

## 2017-07-13 NOTE — NURSING
Call placed to Dr. Carlos. Abnormal blood pressure reported(SBP>180). No new orders. MD reports he will try to get NP to see pt.

## 2017-07-13 NOTE — ASSESSMENT & PLAN NOTE
7/11 blood culture 2/4 bottles positive for GPC staph and strep. Repeat blood culture 7/13 pending . Continue Cipro/flagyl IV. Start  Rocephin (concern for persistent strep viridan endocarditis as history of not taking Keflex as instructed) and vancomycin. Hold home keflex for now. Obtain CRP and SED  Rate. Consult infectious disease. See also Endocarditis.

## 2017-07-13 NOTE — ED PROVIDER NOTES
Encounter Date: 7/13/2017       History     Chief Complaint   Patient presents with    Blood Infection     Rolo Blanca is a 77 y.o. male who presents to the Emergency Department with  feeling bad.  Currently being treated for diverticulitis taking cipro and metronidazole.  Not getting better.  Continued vomiting.  Vomited up BP meds last night.  Patient reports getting light headed and dizzy with standing today.  Nearly passed out with standing today.  Taking all medications a perscribed, but still feeling bad.       The history is provided by the patient.   Illness    The current episode started several days ago. The problem occurs continuously. The problem has been unchanged. The pain is at a severity of 7/10. Nothing relieves the symptoms. The symptoms are aggravated by eating and movement. Associated symptoms include a fever, abdominal pain, nausea and vomiting. Pertinent negatives include no constipation, no headaches, no sore throat, no shortness of breath and no rash.     Review of patient's allergies indicates:  No Known Allergies  Past Medical History:   Diagnosis Date    MI, old 2014     Past Surgical History:   Procedure Laterality Date    AORTIC VALVE REPLACEMENT  2014    CARDIAC SURGERY      CARDIAC VALVE SURGERY      Bio AVR 2014    COLON SURGERY  2000    CORONARY ARTERY BYPASS GRAFT  2014    x2    TONSILLECTOMY       No family history on file.  Social History   Substance Use Topics    Smoking status: Never Smoker    Smokeless tobacco: Never Used    Alcohol use Yes      Comment: 2-3 X'S A WEEK     Review of Systems   Constitutional: Positive for activity change, appetite change, chills, fatigue and fever.   HENT: Negative for sore throat.    Respiratory: Negative for shortness of breath.    Cardiovascular: Negative for chest pain.   Gastrointestinal: Positive for abdominal pain, nausea and vomiting. Negative for constipation.   Genitourinary: Negative for dysuria.   Musculoskeletal:  Positive for myalgias. Negative for back pain.   Skin: Negative for rash.   Neurological: Negative for weakness and headaches.   Hematological: Does not bruise/bleed easily.   All other systems reviewed and are negative.      Physical Exam     Initial Vitals [07/13/17 1147]   BP Pulse Resp Temp SpO2   (!) 158/71 93 (!) 22 99 °F (37.2 °C) 97 %      MAP       100         Physical Exam    Nursing note and vitals reviewed.  Constitutional: He appears well-developed and well-nourished. He is not diaphoretic. He appears distressed.   HENT:   Head: Normocephalic and atraumatic.   Right Ear: External ear normal.   Left Ear: External ear normal.   Nose: Nose normal.   Mouth/Throat: Oropharynx is clear and moist.   Eyes: EOM are normal. Pupils are equal, round, and reactive to light. Right eye exhibits no discharge. Left eye exhibits no discharge.   Neck: Normal range of motion. Neck supple. No tracheal deviation present. No JVD present.   Cardiovascular: Normal rate, regular rhythm, normal heart sounds and intact distal pulses. Exam reveals no gallop and no friction rub.    No murmur heard.  Pulmonary/Chest: Breath sounds normal. No respiratory distress. He has no wheezes. He has no rhonchi. He has no rales. He exhibits no tenderness.   Abdominal: Soft. Bowel sounds are normal. He exhibits distension. He exhibits no mass. There is tenderness. There is no rebound and no guarding.       Musculoskeletal: Normal range of motion.   Neurological: He is alert and oriented to person, place, and time. He displays normal reflexes. No cranial nerve deficit or sensory deficit.   Skin: Skin is warm. Capillary refill takes 2 to 3 seconds. No rash noted. There is pallor.   Psychiatric: He has a normal mood and affect.         ED Course   Critical Care  Date/Time: 7/13/2017 7:01 PM  Performed by: REYES MCNAMARA  Authorized by: REYES MCNAMARA   Direct patient critical care time: 17 minutes  Additional history critical care time: 15  minutes  Ordering / reviewing critical care time: 12 minutes  Documentation critical care time: 10 minutes  Consulting other physicians critical care time: 8 minutes  Consult with family critical care time: 8 minutes  Total critical care time (exclusive of procedural time) : 70 minutes  Critical care was time spent personally by me on the following activities: evaluation of patient's response to treatment, obtaining history from patient or surrogate, ordering and review of laboratory studies, pulse oximetry, review of old charts, examination of patient, ordering and performing treatments and interventions, ordering and review of radiographic studies and re-evaluation of patient's condition.        Labs Reviewed   POCT B-TYPE NATRIURETIC PEPTIDE (BNP) - Abnormal; Notable for the following:        Result Value    POC B-Type Natriuretic Peptide 375 (*)     All other components within normal limits   ISTAT PROCEDURE - Abnormal; Notable for the following:     POC PO2 26 (*)     POC HCO3 23.5 (*)     POC SATURATED O2 47 (*)     All other components within normal limits   POCT CMP - Abnormal; Notable for the following:     Alkaline Phosphatase, POC 21 (*)     POC BUN 23 (*)     POC Chloride 95 (*)     POC Creatinine 1.8 (*)     POC Glucose 178 (*)     All other components within normal limits   POCT LIVER PANEL - Abnormal; Notable for the following:     Albumin, POC 3.3 (*)     Alkaline Phosphatase, POC 19 (*)     All other components within normal limits   CULTURE, BLOOD   CULTURE, BLOOD   TROPONIN ISTAT   POCT CBC   POCT CMP   POCT PROTIME-INR   POCT B-TYPE NATRIURETIC PEPTIDE (BNP)   POCT TROPONIN   POCT LIVER PANEL   ISTAT PROCEDURE    amylase normal at 44.  .  Troponin is 0.02.  INR is 1.1.  Lactic is 1.79.  PH is 7.37.  CMP sodium 1:30 potassium 4.1 BUN 23 and creatinine 1.8.  White blood cell count is elevated at 17.9 hemoglobin is 11.3 hematocrit is 33.4 and platelets are 181.  White blood cell count is  elevated from ED visit on Tuesday, July 11.        EKG Readings: (Independently Interpreted)   Initial Reading: No STEMI. Rhythm: Normal Sinus Rhythm. Heart Rate: 86. Axis: Left Axis Deviation. Clinical Impression: Normal Sinus Rhythm Other Impression: abnormal EKG.  LVH via criteria.  QTc 442                            ED Course   Granddaughter brought paatient in to the ED Nancy Singleton [phone]   Pt was seen here 7/11 for N/D/D and abdominal pain.  I Called this morning  check on patient he reported not feeling better.  Contuined vomiting.  Discussed positive blood cultures and need to return to the ED for repeat evaluation.  Patient is pale, distresses and orthostatic on arrival to ED.  Elevated Heart rate, respiratory rate, and WBC meeting SIRS criteria for sepsis.   Contacted tranfers center and  spoke with Cortes at 13:30. Consultation with Dr Palomares for admission due to Sepsis, RIDGE, diverticulitis, and near syncope.  Discussed patient's presentation, past medical history, physical exam, blood culture, and ED course.  Also discussed vital signs and diagnosis is Sepsis, RIDGE, diverticulitis, and near syncope.  At this time patient will be transferred & admitted.  Patient is agreeable to transfer & admission.    Clinical Impression:   The primary encounter diagnosis was Postural dizziness with near syncope. A diagnosis of Diverticulitis of intestine, unspecified bleeding status, unspecified complication status, unspecified part of intestinal tract was also pertinent to this visit.                           Freda Duran DO  07/13/17 1900

## 2017-07-14 LAB
ANION GAP SERPL CALC-SCNC: 8 MMOL/L
BASOPHILS # BLD AUTO: 0.04 K/UL
BASOPHILS NFR BLD: 0.2 %
BUN SERPL-MCNC: 18 MG/DL
CALCIUM SERPL-MCNC: 8.9 MG/DL
CHLORIDE SERPL-SCNC: 105 MMOL/L
CO2 SERPL-SCNC: 22 MMOL/L
CREAT SERPL-MCNC: 1.4 MG/DL
CRP SERPL-MCNC: 177.6 MG/L
DIFFERENTIAL METHOD: ABNORMAL
EOSINOPHIL # BLD AUTO: 0.7 K/UL
EOSINOPHIL NFR BLD: 4 %
ERYTHROCYTE [DISTWIDTH] IN BLOOD BY AUTOMATED COUNT: 15.8 %
ERYTHROCYTE [SEDIMENTATION RATE] IN BLOOD BY WESTERGREN METHOD: 66 MM/HR
EST. GFR  (AFRICAN AMERICAN): 55 ML/MIN/1.73 M^2
EST. GFR  (NON AFRICAN AMERICAN): 48 ML/MIN/1.73 M^2
ESTIMATED PA SYSTOLIC PRESSURE: 48.7
GLUCOSE SERPL-MCNC: 128 MG/DL
HCT VFR BLD AUTO: 32.3 %
HGB BLD-MCNC: 10.4 G/DL
LYMPHOCYTES # BLD AUTO: 1.5 K/UL
LYMPHOCYTES NFR BLD: 8.8 %
MCH RBC QN AUTO: 26.6 PG
MCHC RBC AUTO-ENTMCNC: 32.2 %
MCV RBC AUTO: 83 FL
MITRAL VALVE REGURGITATION: ABNORMAL
MONOCYTES # BLD AUTO: 1.9 K/UL
MONOCYTES NFR BLD: 11 %
NEUTROPHILS # BLD AUTO: 13.1 K/UL
NEUTROPHILS NFR BLD: 76 %
PLATELET # BLD AUTO: 220 K/UL
PMV BLD AUTO: 10.4 FL
POTASSIUM SERPL-SCNC: 4.1 MMOL/L
RBC # BLD AUTO: 3.91 M/UL
RETIRED EF AND QEF - SEE NOTES: 55 (ref 55–65)
SODIUM SERPL-SCNC: 135 MMOL/L
TRICUSPID VALVE REGURGITATION: ABNORMAL
WBC # BLD AUTO: 17.18 K/UL

## 2017-07-14 PROCEDURE — 25000003 PHARM REV CODE 250: Performed by: NURSE PRACTITIONER

## 2017-07-14 PROCEDURE — 85651 RBC SED RATE NONAUTOMATED: CPT

## 2017-07-14 PROCEDURE — 25000003 PHARM REV CODE 250: Performed by: HOSPITALIST

## 2017-07-14 PROCEDURE — 99223 1ST HOSP IP/OBS HIGH 75: CPT | Mod: ,,, | Performed by: INTERNAL MEDICINE

## 2017-07-14 PROCEDURE — 25500020 PHARM REV CODE 255: Performed by: HOSPITALIST

## 2017-07-14 PROCEDURE — 63600175 PHARM REV CODE 636 W HCPCS: Performed by: HOSPITALIST

## 2017-07-14 PROCEDURE — 86140 C-REACTIVE PROTEIN: CPT

## 2017-07-14 PROCEDURE — 85025 COMPLETE CBC W/AUTO DIFF WBC: CPT

## 2017-07-14 PROCEDURE — 93306 TTE W/DOPPLER COMPLETE: CPT

## 2017-07-14 PROCEDURE — 63600175 PHARM REV CODE 636 W HCPCS: Performed by: NURSE PRACTITIONER

## 2017-07-14 PROCEDURE — 93306 TTE W/DOPPLER COMPLETE: CPT | Mod: 26,,, | Performed by: INTERNAL MEDICINE

## 2017-07-14 PROCEDURE — 36415 COLL VENOUS BLD VENIPUNCTURE: CPT

## 2017-07-14 PROCEDURE — 80048 BASIC METABOLIC PNL TOTAL CA: CPT

## 2017-07-14 PROCEDURE — 12000002 HC ACUTE/MED SURGE SEMI-PRIVATE ROOM

## 2017-07-14 RX ORDER — PANTOPRAZOLE SODIUM 40 MG/1
40 TABLET, DELAYED RELEASE ORAL DAILY
Status: DISCONTINUED | OUTPATIENT
Start: 2017-07-14 | End: 2017-07-24

## 2017-07-14 RX ORDER — HEPARIN SODIUM 5000 [USP'U]/ML
5000 INJECTION, SOLUTION INTRAVENOUS; SUBCUTANEOUS EVERY 8 HOURS
Status: DISCONTINUED | OUTPATIENT
Start: 2017-07-14 | End: 2017-07-25 | Stop reason: HOSPADM

## 2017-07-14 RX ADMIN — LISINOPRIL 20 MG: 20 TABLET ORAL at 08:07

## 2017-07-14 RX ADMIN — METRONIDAZOLE 500 MG: 500 INJECTION, SOLUTION INTRAVENOUS at 03:07

## 2017-07-14 RX ADMIN — METOPROLOL SUCCINATE 25 MG: 25 TABLET, EXTENDED RELEASE ORAL at 08:07

## 2017-07-14 RX ADMIN — METRONIDAZOLE 500 MG: 500 INJECTION, SOLUTION INTRAVENOUS at 11:07

## 2017-07-14 RX ADMIN — BUPROPION HYDROCHLORIDE 300 MG: 150 TABLET, FILM COATED, EXTENDED RELEASE ORAL at 08:07

## 2017-07-14 RX ADMIN — PANTOPRAZOLE SODIUM 40 MG: 40 TABLET, DELAYED RELEASE ORAL at 02:07

## 2017-07-14 RX ADMIN — VANCOMYCIN HYDROCHLORIDE 1500 MG: 1 INJECTION, POWDER, LYOPHILIZED, FOR SOLUTION INTRAVENOUS at 01:07

## 2017-07-14 RX ADMIN — HEPARIN SODIUM 5000 UNITS: 5000 INJECTION, SOLUTION INTRAVENOUS; SUBCUTANEOUS at 01:07

## 2017-07-14 RX ADMIN — ONDANSETRON 8 MG: 8 TABLET, ORALLY DISINTEGRATING ORAL at 11:07

## 2017-07-14 RX ADMIN — PROMETHAZINE HYDROCHLORIDE 12.5 MG: 25 INJECTION INTRAMUSCULAR; INTRAVENOUS at 06:07

## 2017-07-14 RX ADMIN — ASPIRIN 81 MG: 81 TABLET, COATED ORAL at 08:07

## 2017-07-14 RX ADMIN — SERTRALINE HYDROCHLORIDE 50 MG: 50 TABLET ORAL at 08:07

## 2017-07-14 RX ADMIN — HEPARIN SODIUM 5000 UNITS: 5000 INJECTION, SOLUTION INTRAVENOUS; SUBCUTANEOUS at 08:07

## 2017-07-14 RX ADMIN — CIPROFLOXACIN 400 MG: 2 INJECTION, SOLUTION INTRAVENOUS at 01:07

## 2017-07-14 RX ADMIN — ATORVASTATIN CALCIUM 10 MG: 10 TABLET, FILM COATED ORAL at 08:07

## 2017-07-14 RX ADMIN — LEVOTHYROXINE SODIUM 75 MCG: 75 TABLET ORAL at 06:07

## 2017-07-14 RX ADMIN — IOHEXOL 15 ML: 300 INJECTION, SOLUTION INTRAVENOUS at 01:07

## 2017-07-14 RX ADMIN — HEPARIN SODIUM 5000 UNITS: 5000 INJECTION, SOLUTION INTRAVENOUS; SUBCUTANEOUS at 09:07

## 2017-07-14 RX ADMIN — CEFTRIAXONE 2 G: 2 INJECTION, SOLUTION INTRAVENOUS at 08:07

## 2017-07-14 NOTE — PLAN OF CARE
07/14/17 1345   Discharge Assessment   Assessment Type Discharge Planning Assessment     SW attempted assessment, however, pt off the floor for procedure.

## 2017-07-14 NOTE — SUBJECTIVE & OBJECTIVE
Past Medical History:   Diagnosis Date    MI, old 2014       Past Surgical History:   Procedure Laterality Date    AORTIC VALVE REPLACEMENT  2014    CARDIAC SURGERY      CARDIAC VALVE SURGERY      Bio AVR 2014    COLON SURGERY  2000    CORONARY ARTERY BYPASS GRAFT  2014    x2    TONSILLECTOMY         Review of patient's allergies indicates:  No Known Allergies    Current Facility-Administered Medications on File Prior to Encounter   Medication    [COMPLETED] ciprofloxacin (CIPRO)400mg/200ml D5W IVPB 400 mg    [COMPLETED] ondansetron injection 8 mg    [COMPLETED] piperacillin-tazobactam 4.5 g in dextrose 5 % 100 mL IVPB (ready to mix system)    [COMPLETED] sodium chloride 0.9% bolus 1,000 mL    [COMPLETED] vancomycin 1 g in dextrose 5 % 250 mL IVPB (ready to mix system)     Current Outpatient Prescriptions on File Prior to Encounter   Medication Sig    aspirin (ECOTRIN) 81 MG EC tablet Take 1 tablet (81 mg total) by mouth once daily.    atorvastatin (LIPITOR) 10 MG tablet TAKE ONE TABLET BY MOUTH EVERY DAY    buPROPion (WELLBUTRIN XL) 300 MG 24 hr tablet Take 1 tablet (300 mg total) by mouth once daily.    cephALEXin (KEFLEX) 500 MG capsule TAKE ONE CAPSULE BY MOUTH EVERY TWELVE HOURS    ciprofloxacin HCl (CIPRO) 500 MG tablet Take 1 tablet (500 mg total) by mouth 2 (two) times daily.    diazepam (VALIUM) 2 MG tablet     hydrocodone-acetaminophen 5-325mg (NORCO) 5-325 mg per tablet Take 1 tablet by mouth every 8 (eight) hours as needed for Pain (As needed for severe 10 out of 10 pain).    levothyroxine (SYNTHROID) 75 MCG tablet TAKE ONE TABLET BY MOUTH EVERY DAY BEFORE BREAKFAST    lisinopril (PRINIVIL,ZESTRIL) 20 MG tablet Take 2 tablets by mouth daily (Patient taking differently: 20 mg once daily. Take 2 tablets by mouth daily)    metoprolol succinate (TOPROL-XL) 25 MG 24 hr tablet Take 1 tablet (25 mg total) by mouth once daily.    metronidazole (FLAGYL) 500 MG tablet Take 1 tablet (500 mg  total) by mouth every 6 (six) hours.    nystatin-triamcinolone (MYCOLOG II) cream     [] ondansetron (ZOFRAN-ODT) 8 MG TbDL Take 1 tablet (8 mg total) by mouth every 6 (six) hours as needed.    promethazine (PHENERGAN) 25 MG suppository Place 1 suppository (25 mg total) rectally every 6 (six) hours as needed (Use if vomiting is not controlled with oral medication).    sertraline (ZOLOFT) 50 MG tablet Take 1 tablet (50 mg total) by mouth once daily.    triamcinolone acetonide 0.1% (KENALOG) 0.1 % cream      Family History     None        Social History Main Topics    Smoking status: Never Smoker    Smokeless tobacco: Never Used    Alcohol use Yes      Comment: 2-3 X'S A WEEK    Drug use: No    Sexual activity: Not on file     Review of Systems   Constitutional: Negative for appetite change and chills.   HENT: Negative for congestion, ear discharge and rhinorrhea.    Eyes: Negative for pain, redness and itching.   Respiratory: Negative for cough, chest tightness, shortness of breath, wheezing and stridor.    Cardiovascular: Negative for chest pain and leg swelling.   Gastrointestinal: Negative for abdominal pain, constipation, diarrhea, nausea and vomiting.   Endocrine: Negative for cold intolerance and polydipsia.   Genitourinary: Negative for dysuria and hematuria.   Musculoskeletal: Negative for arthralgias, joint swelling and myalgias.   Skin: Negative for color change and pallor.   Neurological: Negative for syncope, light-headedness and headaches.   Psychiatric/Behavioral: Negative for confusion and hallucinations. The patient is not nervous/anxious.      Objective:     Vital Signs (Most Recent):  Temp: 99 °F (37.2 °C) (17)  Pulse: 101 (17)  Resp: 18 (17)  BP: (!) 149/71 (17)  SpO2: 95 % (17) Vital Signs (24h Range):  Temp:  [98.2 °F (36.8 °C)-99.5 °F (37.5 °C)] 99 °F (37.2 °C)  Pulse:  [] 101  Resp:  [18-22] 18  SpO2:  [94 %-98 %] 95  %  BP: (122-189)/(55-84) 149/71     Weight: 98.2 kg (216 lb 8 oz)  Body mass index is 32.44 kg/m².    Physical Exam   Constitutional: He is oriented to person, place, and time. He appears well-developed and well-nourished. No distress.   HENT:   Head: Normocephalic and atraumatic.   Right Ear: External ear normal.   Left Ear: External ear normal.   Eyes: Conjunctivae and EOM are normal. Pupils are equal, round, and reactive to light.   Neck: Normal range of motion. Neck supple.   Cardiovascular: Normal rate and regular rhythm.    Murmur heard.   Systolic murmur is present   Pulmonary/Chest: Effort normal and breath sounds normal. He has no wheezes. He has no rales.   Abdominal: Soft. Bowel sounds are normal. There is tenderness in the left lower quadrant. There is no guarding.   Musculoskeletal: Normal range of motion.   Neurological: He is alert and oriented to person, place, and time. No cranial nerve deficit.   Skin: Skin is warm. He is not diaphoretic.   Psychiatric: He has a normal mood and affect. His behavior is normal. Judgment and thought content normal.   Vitals reviewed.       Significant Labs: Blood Culture:     Recent Labs  Lab 07/13/17  1159 07/13/17  1214   LABBLOO No Growth to date No Growth to date     BMP:     Recent Labs  Lab 07/13/17  1736      *   K 4.0      CO2 22*   BUN 21   CREATININE 1.5*   CALCIUM 8.8   MG 2.0     CBC:     Recent Labs  Lab 07/13/17  1736   WBC 15.99*   HGB 11.0*   HCT 34.2*        Cardiac Markers: No results for input(s): CKMB, MYOGLOBIN, BNP, TROPISTAT in the last 48 hours.  Lactic Acid: No results for input(s): LACTATE in the last 48 hours.  Lipid Panel: No results for input(s): CHOL, HDL, LDLCALC, TRIG, CHOLHDL in the last 48 hours.  Magnesium:     Recent Labs  Lab 07/13/17  1736   MG 2.0       Significant Imaging: CXR showed mild cardiomegaly, suggestive of CHF.

## 2017-07-14 NOTE — ASSESSMENT & PLAN NOTE
Appears to be resolving as no further abdominal pain or NVD. Had formed BM today. Continue flagyl and cipro.

## 2017-07-14 NOTE — NURSING
Unable to obtain telemetry box, none available at this time. Call placed to LUISA santana, and say. Unable to find extra box on third floor. No telemetry boxes on [facility]. Charge nurse Yamile notified per Kristi night nurse.

## 2017-07-14 NOTE — PLAN OF CARE
Problem: Fall Risk (Adult)  Goal: Identify Related Risk Factors and Signs and Symptoms  Related risk factors and signs and symptoms are identified upon initiation of Human Response Clinical Practice Guideline (CPG)   Outcome: Ongoing (interventions implemented as appropriate)   07/14/17 0339   Fall Risk   Related Risk Factors (Fall Risk) age-related changes;environment unfamiliar;depression/anxiety   Signs and Symptoms (Fall Risk) presence of risk factors     Goal: Absence of Falls  Patient will demonstrate the desired outcomes by discharge/transition of care.   Outcome: Ongoing (interventions implemented as appropriate)   07/14/17 0339   Fall Risk (Adult)   Absence of Falls making progress toward outcome       Problem: Infection, Risk/Actual (Adult)  Goal: Identify Related Risk Factors and Signs and Symptoms  Related risk factors and signs and symptoms are identified upon initiation of Human Response Clinical Practice Guideline (CPG)   Outcome: Ongoing (interventions implemented as appropriate)   07/14/17 0339   Infection, Risk/Actual   Related Risk Factors (Infection, Risk/Actual) chronic illness/condition;age extremes   Signs and Symptoms (Infection, Risk/Actual) cultures positive     Goal: Infection Prevention/Resolution  Patient will demonstrate the desired outcomes by discharge/transition of care.   Outcome: Ongoing (interventions implemented as appropriate)   07/14/17 0339   Infection, Risk/Actual (Adult)   Infection Prevention/Resolution making progress toward outcome

## 2017-07-14 NOTE — H&P
[facility] Medicine  History & Physical    Patient Name: Rolo Blanca  MRN: [id number]  Admission Date: 7/13/2017  Attending Physician: Dr. Villagomez  Primary Care Provider: Scott Valadez MD         Patient information was obtained from patient and ER records.     Subjective:     Principal Problem:Positive blood cultures    Chief Complaint: No chief complaint on file.       HPI: The patient is a 78 y/o male with a significant history of colon cancer 2000 sp partial colon resection, hypertension, hyperlipidemia, TIAs, CAD s/p MI/CABG 2014, AVR- bioprosthetic valve and AVR and MV endocarditis 2015 now on suppressive antibiotics (currently followed by ID -[facility] Adarsh Howell) who was told by [facility] to return to ED for 7/11positive blood cultures 2/4 (one bottle GPC chain strep, one bottle GPC cluster staph). Patient then transferred to [facility] for further evaluation. Patient initially went to [facility] ED on 7/11 with complaints of abdominal pain, nausea, and vomiting which started 3 days ago then found to have on CT abdomen  sigmoid diverticulitis then treated with cipro and metronidazole. Patient reported today abdominal pain and vomiting resolved and had a normal formed bowel movement. Last episode of nausea and vomiting was last night. Also c/o intermittent dizziness upon position changes but denies chest pain or  shortness of breath. He denies fever, chills, URI symptoms and headaches.     Family concern about suppressive antibiotic Keflex for two years after endocarditis episode. Patient missed ID appointment as plan was for repeat 2 D echo in Jan 2017 to evaluate for resolved vegetation on MV).       Past Medical History:   Diagnosis Date    MI, old 2014       Past Surgical History:   Procedure Laterality Date    AORTIC VALVE REPLACEMENT  2014    CARDIAC SURGERY      CARDIAC VALVE SURGERY      Bio AVR 2014    COLON SURGERY  2000    CORONARY ARTERY BYPASS GRAFT  2014    x2     TONSILLECTOMY         Review of patient's allergies indicates:  No Known Allergies    Current Facility-Administered Medications on File Prior to Encounter   Medication    [COMPLETED] aspirin tablet 325 mg    [COMPLETED] ciprofloxacin (CIPRO)400mg/200ml D5W IVPB 400 mg    [COMPLETED] ondansetron injection 8 mg    [COMPLETED] piperacillin-tazobactam 4.5 g in dextrose 5 % 100 mL IVPB (ready to mix system)    [COMPLETED] sodium chloride 0.9% bolus 1,000 mL    [COMPLETED] vancomycin 1 g in dextrose 5 % 250 mL IVPB (ready to mix system)     Current Outpatient Prescriptions on File Prior to Encounter   Medication Sig    aspirin (ECOTRIN) 81 MG EC tablet Take 1 tablet (81 mg total) by mouth once daily.    atorvastatin (LIPITOR) 10 MG tablet TAKE ONE TABLET BY MOUTH EVERY DAY    buPROPion (WELLBUTRIN XL) 300 MG 24 hr tablet Take 1 tablet (300 mg total) by mouth once daily.    cephALEXin (KEFLEX) 500 MG capsule TAKE ONE CAPSULE BY MOUTH EVERY TWELVE HOURS    ciprofloxacin HCl (CIPRO) 500 MG tablet Take 1 tablet (500 mg total) by mouth 2 (two) times daily.    diazepam (VALIUM) 2 MG tablet     hydrocodone-acetaminophen 5-325mg (NORCO) 5-325 mg per tablet Take 1 tablet by mouth every 8 (eight) hours as needed for Pain (As needed for severe 10 out of 10 pain).    levothyroxine (SYNTHROID) 75 MCG tablet TAKE ONE TABLET BY MOUTH EVERY DAY BEFORE BREAKFAST    lisinopril (PRINIVIL,ZESTRIL) 20 MG tablet Take 2 tablets by mouth daily (Patient taking differently: 20 mg once daily. Take 2 tablets by mouth daily)    metoprolol succinate (TOPROL-XL) 25 MG 24 hr tablet Take 1 tablet (25 mg total) by mouth once daily.    metronidazole (FLAGYL) 500 MG tablet Take 1 tablet (500 mg total) by mouth every 6 (six) hours.    nystatin-triamcinolone (MYCOLOG II) cream     ondansetron (ZOFRAN-ODT) 8 MG TbDL Take 1 tablet (8 mg total) by mouth every 6 (six) hours as needed.    promethazine (PHENERGAN) 25 MG suppository Place 1  suppository (25 mg total) rectally every 6 (six) hours as needed (Use if vomiting is not controlled with oral medication).    sertraline (ZOLOFT) 50 MG tablet Take 1 tablet (50 mg total) by mouth once daily.    triamcinolone acetonide 0.1% (KENALOG) 0.1 % cream      Family History     None        Social History Main Topics    Smoking status: Never Smoker    Smokeless tobacco: Never Used    Alcohol use Yes      Comment: 2-3 X'S A WEEK    Drug use: No    Sexual activity: Not on file     Review of Systems   Constitutional: Negative for appetite change and chills.   HENT: Negative for congestion, ear discharge and rhinorrhea.    Eyes: Negative for pain, redness and itching.   Respiratory: Positive for shortness of breath. Negative for cough, chest tightness, wheezing and stridor.    Cardiovascular: Negative for chest pain and leg swelling.   Gastrointestinal: Positive for abdominal pain. Negative for constipation, diarrhea, nausea and vomiting.   Endocrine: Negative for cold intolerance and polydipsia.   Genitourinary: Negative for dysuria and hematuria.   Musculoskeletal: Negative for arthralgias, joint swelling and myalgias.   Skin: Negative for color change and pallor.   Neurological: Positive for light-headedness. Negative for syncope and headaches.   Psychiatric/Behavioral: Negative for confusion and hallucinations. The patient is nervous/anxious.      Objective:     Vital Signs (Most Recent):  Temp: 98.5 °F (36.9 °C) (07/13/17 1700)  Pulse: 85 (07/13/17 1700)  Resp: 18 (07/13/17 1700)  BP: (!) 184/82 (manual) (07/13/17 1725)  SpO2: 98 % (07/13/17 1700) Vital Signs (24h Range):  Temp:  [98.5 °F (36.9 °C)-99.5 °F (37.5 °C)] 98.5 °F (36.9 °C)  Pulse:  [77-94] 85  Resp:  [18-22] 18  SpO2:  [94 %-98 %] 98 %  BP: (123-185)/(55-84) 184/82        There is no height or weight on file to calculate BMI.    Physical Exam   Constitutional: He is oriented to person, place, and time. He appears well-developed and  well-nourished. No distress.   HENT:   Head: Normocephalic and atraumatic.   Right Ear: External ear normal.   Left Ear: External ear normal.   Eyes: Conjunctivae and EOM are normal. Pupils are equal, round, and reactive to light.   Neck: Normal range of motion. Neck supple.   Cardiovascular: Normal rate and regular rhythm.    Murmur heard.   Systolic murmur is present   Pulmonary/Chest: Effort normal and breath sounds normal. He has no wheezes. He has no rales.   Abdominal: Soft. Bowel sounds are normal. There is tenderness in the left lower quadrant. There is no guarding.   Musculoskeletal: Normal range of motion.   Neurological: He is alert and oriented to person, place, and time. No cranial nerve deficit.   Skin: Skin is warm. He is not diaphoretic.   Psychiatric: He has a normal mood and affect. His behavior is normal. Judgment and thought content normal.   Vitals reviewed.       Significant Labs: Blood Culture: No results for input(s): LABBLOO in the last 48 hours.  BMP: No results for input(s): GLU, NA, K, CL, CO2, BUN, CREATININE, CALCIUM, MG in the last 48 hours.  CBC: No results for input(s): WBC, HGB, HCT, PLT in the last 48 hours.  Cardiac Markers: No results for input(s): CKMB, MYOGLOBIN, BNP, TROPISTAT in the last 48 hours.  Lactic Acid: No results for input(s): LACTATE in the last 48 hours.  Lipid Panel: No results for input(s): CHOL, HDL, LDLCALC, TRIG, CHOLHDL in the last 48 hours.  Magnesium: No results for input(s): MG in the last 48 hours.    Significant Imaging: CXR showed mild cardiomegaly, suggestive of CHF.    Assessment/Plan:     * Positive blood cultures    7/11 blood culture 2/4 bottles positive for GPC staph and strep. Repeat blood culture 7/13 pending . Continue Cipro/flagyl IV. Start  Rocephin (concern for persistent strep viridan endocarditis as history of not taking Keflex as instructed) and vancomycin. Hold home keflex for now. Obtain CRP and SED  Rate. Consult infectious disease.  See also Endocarditis.           Endocarditis      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 Per ID documentation 12/2015 [facility] KHALIDA showed vegetation on aortic and mitral valve.     Per their discharge summary, cultures had cleared at time of discharge on 11/27.   1/25/16 TTE shows mobile mass on mitral valve and resolved vegetation on aortic valve. Patient was treated with IV rocephin x 6 weeks then placed on suppressive antibiotic Keflex and plan was for repeat 2 D echo in Jan 2017 however patient have missed appointment with ID. Obtain 2 D echo and antibiotics as above and inflammatory markers.         Diverticulitis of large intestine without bleeding    Appears to be resolving as no further abdominal pain or NVD. Had formed BM today. Continue flagyl and cipro.         Depression    Pt not compliant with Zoloft and Wellbutrin. Restart Zoloft and wellbutrin.           History of colon cancer    Pt had colonoscopy 2 years ago and reported unremarkable. Encourage follow up with GI.          Hypothyroidism    Continue Synthroid.  Lab Results   Component Value Date    TSH 1.730 02/06/2017             Chronic kidney disease, stage III (moderate)    Cr=1.8, BUN 23.GFR 44. Baseline Cr ~1.5. Continue IV fluids. Avoid nephrotoxic medications.        Hyperlipidemia    Pt not compliant with current statin regiment. Continue and encourage daily statin intake.   Lab Results   Component Value Date    LDLCALC 65.2 06/17/2017             S/P AVR    See above, endocarditis.        S/P CABG (coronary artery bypass graft)    No acute issues. Continue ASA and statin.            VTE Risk Mitigation         Ordered     Place WALDO hose  Until discontinued      07/13/17 1843     Place sequential compression device  Until discontinued      07/13/17 1843        Ivone Demarco NP  Department of Hospital Medicine   [facility]

## 2017-07-14 NOTE — PROGRESS NOTES
[facility] Medicine  Progress Note    Patient Name: Rolo Blanca  MRN: [id number]  Patient Class: IP- Inpatient   Admission Date: 7/13/2017  Length of Stay: 1 days  Attending Physician: Marguerite Arana MD  Primary Care Provider: Scott Valadez MD        Subjective:     Principal Problem:Positive blood cultures    HPI:  The patient is a 78 y/o male with a significant history of colon cancer 2000 sp partial colon resection, hypertension, hyperlipidemia, TIAs, CAD s/p MI/CABG 2014, AVR- bioprosthetic valve and AVR and MV endocarditis 2015 now on suppressive antibiotics (currently followed by ID -[facility] Adarsh Howlel) who was told by [facility] to return to ED for 7/11positive blood cultures 2/4 (one bottle GPC chain strep, one bottle GPC cluster staph). Patient then transferred to [facility] for further evaluation. Patient initially went to [place] ED on 7/11 with complaints of abdominal pain, nausea, and vomiting which started 3 days ago then found to have on CT abdomen  sigmoid diverticulitis then treated with cipro and metronidazole. Patient reported today abdominal pain and vomiting resolved and had a normal formed bowel movement. Last episode of nausea and vomiting was last night. Also c/o intermittent dizziness upon position changes but denies chest pain or  shortness of breath. He denies fever, chills, URI symptoms and headaches.     Family concern about suppressive antibiotic Keflex for two years after endocarditis episode. Patient missed ID appointment as plan was for repeat 2 D echo in Jan 2017 to evaluate for resolved vegetation on MV).       Hospital Course:  The patient is a 78 y/o male with a significant history of colon cancer 2000 sp partial colon resection, hypertension, hyperlipidemia, TIAs, CAD s/p MI/CABG 2014, AVR- bioprosthetic valve and AVR and MV endocarditis 2015 now on suppressive antibiotics (currently followed by ID -[facility] Adarsh Howell) who was told by [facility] to  return to ED for 7/11positive blood cultures 2/4 (one bottle GPC chain strep, one bottle GPC cluster staph). Patient then transferred to [facility] for further evaluation!!!. Patient initially went to [place] ED on 7/11 with complaints of abdominal pain, nausea, and vomiting which started 3 days ago then found to have on CT abdomen  sigmoid diverticulitis then treated with cipro and metronidazole. Patient had some  abdominal pain and vomiting resolved and had a normal formed bowel movement. Last episode of nausea and vomiting was 2 days ago,he denies any nausea,vomitng at this time,abdopmional pain is resolved,repeat blood culture show no growth,he in broad spectrum IV Abx.will have echo today and ID has chi consulted,consider transfer to [facility].  Family concern about suppressive antibiotic Keflex for two years after endocarditis episode. Patient missed ID appointment as plan was for repeat 2 D echo in Jan 2017 to evaluate for resolved vegetation on MV).    Past Medical History:   Diagnosis Date    MI, old 2014       Past Surgical History:   Procedure Laterality Date    AORTIC VALVE REPLACEMENT  2014    CARDIAC SURGERY      CARDIAC VALVE SURGERY      Bio AVR 2014    COLON SURGERY  2000    CORONARY ARTERY BYPASS GRAFT  2014    x2    TONSILLECTOMY         Review of patient's allergies indicates:  No Known Allergies    Current Facility-Administered Medications on File Prior to Encounter   Medication    [COMPLETED] ciprofloxacin (CIPRO)400mg/200ml D5W IVPB 400 mg    [COMPLETED] ondansetron injection 8 mg    [COMPLETED] piperacillin-tazobactam 4.5 g in dextrose 5 % 100 mL IVPB (ready to mix system)    [COMPLETED] sodium chloride 0.9% bolus 1,000 mL    [COMPLETED] vancomycin 1 g in dextrose 5 % 250 mL IVPB (ready to mix system)     Current Outpatient Prescriptions on File Prior to Encounter   Medication Sig    aspirin (ECOTRIN) 81 MG EC tablet Take 1 tablet (81 mg total) by mouth once daily.    atorvastatin  (LIPITOR) 10 MG tablet TAKE ONE TABLET BY MOUTH EVERY DAY    buPROPion (WELLBUTRIN XL) 300 MG 24 hr tablet Take 1 tablet (300 mg total) by mouth once daily.    cephALEXin (KEFLEX) 500 MG capsule TAKE ONE CAPSULE BY MOUTH EVERY TWELVE HOURS    ciprofloxacin HCl (CIPRO) 500 MG tablet Take 1 tablet (500 mg total) by mouth 2 (two) times daily.    diazepam (VALIUM) 2 MG tablet     hydrocodone-acetaminophen 5-325mg (NORCO) 5-325 mg per tablet Take 1 tablet by mouth every 8 (eight) hours as needed for Pain (As needed for severe 10 out of 10 pain).    levothyroxine (SYNTHROID) 75 MCG tablet TAKE ONE TABLET BY MOUTH EVERY DAY BEFORE BREAKFAST    lisinopril (PRINIVIL,ZESTRIL) 20 MG tablet Take 2 tablets by mouth daily (Patient taking differently: 20 mg once daily. Take 2 tablets by mouth daily)    metoprolol succinate (TOPROL-XL) 25 MG 24 hr tablet Take 1 tablet (25 mg total) by mouth once daily.    metronidazole (FLAGYL) 500 MG tablet Take 1 tablet (500 mg total) by mouth every 6 (six) hours.    nystatin-triamcinolone (MYCOLOG II) cream     [] ondansetron (ZOFRAN-ODT) 8 MG TbDL Take 1 tablet (8 mg total) by mouth every 6 (six) hours as needed.    promethazine (PHENERGAN) 25 MG suppository Place 1 suppository (25 mg total) rectally every 6 (six) hours as needed (Use if vomiting is not controlled with oral medication).    sertraline (ZOLOFT) 50 MG tablet Take 1 tablet (50 mg total) by mouth once daily.    triamcinolone acetonide 0.1% (KENALOG) 0.1 % cream      Family History     None        Social History Main Topics    Smoking status: Never Smoker    Smokeless tobacco: Never Used    Alcohol use Yes      Comment: 2-3 X'S A WEEK    Drug use: No    Sexual activity: Not on file     Review of Systems   Constitutional: Negative for appetite change and chills.   HENT: Negative for congestion, ear discharge and rhinorrhea.    Eyes: Negative for pain, redness and itching.   Respiratory: Negative for cough,  chest tightness, shortness of breath, wheezing and stridor.    Cardiovascular: Negative for chest pain and leg swelling.   Gastrointestinal: Negative for abdominal pain, constipation, diarrhea, nausea and vomiting.   Endocrine: Negative for cold intolerance and polydipsia.   Genitourinary: Negative for dysuria and hematuria.   Musculoskeletal: Negative for arthralgias, joint swelling and myalgias.   Skin: Negative for color change and pallor.   Neurological: Negative for syncope, light-headedness and headaches.   Psychiatric/Behavioral: Negative for confusion and hallucinations. The patient is not nervous/anxious.      Objective:     Vital Signs (Most Recent):  Temp: 99 °F (37.2 °C) (07/14/17 0349)  Pulse: 101 (07/14/17 0349)  Resp: 18 (07/14/17 0349)  BP: (!) 149/71 (07/14/17 0349)  SpO2: 95 % (07/14/17 0349) Vital Signs (24h Range):  Temp:  [98.2 °F (36.8 °C)-99.5 °F (37.5 °C)] 99 °F (37.2 °C)  Pulse:  [] 101  Resp:  [18-22] 18  SpO2:  [94 %-98 %] 95 %  BP: (122-189)/(55-84) 149/71     Weight: 98.2 kg (216 lb 8 oz)  Body mass index is 32.44 kg/m².    Physical Exam   Constitutional: He is oriented to person, place, and time. He appears well-developed and well-nourished. No distress.   HENT:   Head: Normocephalic and atraumatic.   Right Ear: External ear normal.   Left Ear: External ear normal.   Eyes: Conjunctivae and EOM are normal. Pupils are equal, round, and reactive to light.   Neck: Normal range of motion. Neck supple.   Cardiovascular: Normal rate and regular rhythm.    Murmur heard.   Systolic murmur is present   Pulmonary/Chest: Effort normal and breath sounds normal. He has no wheezes. He has no rales.   Abdominal: Soft. Bowel sounds are normal. There is tenderness in the left lower quadrant. There is no guarding.   Musculoskeletal: Normal range of motion.   Neurological: He is alert and oriented to person, place, and time. No cranial nerve deficit.   Skin: Skin is warm. He is not diaphoretic.    Psychiatric: He has a normal mood and affect. His behavior is normal. Judgment and thought content normal.   Vitals reviewed.       Significant Labs: Blood Culture:     Recent Labs  Lab 07/13/17  1159 07/13/17  1214   LABBLOO No Growth to date No Growth to date     BMP:     Recent Labs  Lab 07/13/17  1736      *   K 4.0      CO2 22*   BUN 21   CREATININE 1.5*   CALCIUM 8.8   MG 2.0     CBC:     Recent Labs  Lab 07/13/17  1736   WBC 15.99*   HGB 11.0*   HCT 34.2*        Cardiac Markers: No results for input(s): CKMB, MYOGLOBIN, BNP, TROPISTAT in the last 48 hours.  Lactic Acid: No results for input(s): LACTATE in the last 48 hours.  Lipid Panel: No results for input(s): CHOL, HDL, LDLCALC, TRIG, CHOLHDL in the last 48 hours.  Magnesium:     Recent Labs  Lab 07/13/17  1736   MG 2.0       Significant Imaging: CXR showed mild cardiomegaly, suggestive of CHF.    Assessment/Plan:      Diverticulitis of large intestine without bleeding    Appears to be resolving as no further abdominal pain or NVD. Had formed BM yesterday,. Continue flagyl and cipro. Need repeat colonoscopy as out patient after infectious process resolve.        Depression    Pt not compliant with Zoloft and Wellbutrin. Restart Zoloft and wellbutrin.           History of colon cancer    Pt had colonoscopy 2 years ago and reported unremarkable. Encourage follow up with GI.          Hypothyroidism    Continue Synthroid.  Lab Results   Component Value Date    TSH 1.730 02/06/2017             Chronic kidney disease, stage III (moderate)    Cr=1.8, BUN 23.GFR 44. Baseline Cr ~1.5. Continue IV fluids. Avoid nephrotoxic medications.        Hyperlipidemia    Pt not compliant with current statin regiment. Continue and encourage daily statin intake.   Lab Results   Component Value Date    LDLCALC 65.2 06/17/2017             S/P AVR    See above, endocarditis.        S/P CABG (coronary artery bypass graft)    No acute issues. Continue ASA  and statin.          Chronic bacterial endocarditis      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 Per ID documentation 12/2015 [facility] KHALIDA showed vegetation on aortic and mitral valve.     Per their discharge summary, cultures had cleared at time of discharge on 11/27.  1/25/16 TTE shows mobile mass on mitral valve and resolved vegetation on aortic valve. Patient was treated with IV rocephin x 6 weeks then placed on suppressive antibiotic Keflex and plan was for repeat 2 D echo in Jan 2017 however patient have missed appointment with ID.CT  had no plan for intervention, Obtain 2 D echo and antibiotics as above and inflammatory markers. Consulted ID,may need be transferred to [facility].        * Positive blood cultures    7/11 blood culture 2/4 bottles positive for GPC staph and strep. Repeat blood culture 7/13 pending . Continue Cipro/flagyl IV. Start  Rocephin (concern for persistent strep viridan endocarditis as history of not taking Keflex as instructed) and vancomycin. Hold home keflex for now. Obtain CRP and SED  Rate. Consult infectious disease. See also Endocarditis. Suspect source is endocarditis and not GI tract,will discus with ID.            VTE Risk Mitigation         Ordered     heparin (porcine) injection 5,000 Units  Every 8 hours     Route:  Subcutaneous        07/14/17 0640     Place WALDO hose  Until discontinued      07/13/17 1843     Place sequential compression device  Until discontinued      07/13/17 1843          Marguerite Arana MD  Department of Hospital Medicine   [facility]

## 2017-07-14 NOTE — CONSULTS
[facility]  Cardiology  Consult Note    Patient Name: Rolo Blanca  MRN: [id number]  Admission Date: 7/13/2017  Hospital Length of Stay: 1 days  Code Status: Full Code   Attending Provider: Marguerite Arana MD   Consulting Provider: Gurinder Pedersen MD  Primary Care Physician: Scott Valadez MD  Principal Problem:Positive blood cultures    Patient information was obtained from patient and ER records.     Consults  Subjective:     Chief Complaint:  Endocarditis     HPI:     Patient is a 77 y.o. male presents with  Abdominal pain. He was seen in ed on 7/11/17 with bilateral lower quadrant pain and was diagnosed with acute diverticulitis when a ct abdomen showed mild sigmoid stranding. He was rx with cipro and flagyl and did not improve. He now returns with decreasing abdominal pain and fatigue. He denies fever, sobar or chest pains. 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 He developed mitral regurgitation and several episodes of chf.he went home with hospice after he declined operative intervention. He sought a second opinion at [facility] and was declined as a surgical candidate. Repeat blood cultures on 12/8/15,6/28/16,10/3/16 were all negative. He was comitted to 6 weeks of iv rocephin and stopped iv abx 1/22/16. He was then placed on po keflex 500 mg q 12 hours and apparently has continued that modality but has missed the occasional dose. He was last seen by an id np at [facility] in October 2016. His tte on 12/19/15 and 1/25/16 continued to show a small mitral valve mass with mr- this was felt to have been sterilised and no surgery was undertaken.  His blood cultures form 7/11/17 now are positive for a staph aureus and a strep viridans. His repeat blood cultures taken while on antibiotics are negative form 7/13/17.    EKG NSR LVH LAD  Denies CP, stable  SOB    Past Medical History:   Diagnosis Date    MI, old 2014       Past Surgical History:   Procedure Laterality Date    AORTIC VALVE REPLACEMENT  2014    CARDIAC SURGERY      CARDIAC VALVE SURGERY      Bio AVR 2014    COLON SURGERY  2000    CORONARY ARTERY BYPASS GRAFT  2014    x2    TONSILLECTOMY         Review of patient's allergies indicates:  No Known Allergies    Current Facility-Administered Medications on File Prior to Encounter   Medication    [COMPLETED] ciprofloxacin (CIPRO)400mg/200ml D5W IVPB 400 mg    [COMPLETED] piperacillin-tazobactam 4.5 g in dextrose 5 % 100 mL IVPB (ready to mix system)    [COMPLETED] sodium chloride 0.9% bolus 1,000 mL    [COMPLETED] vancomycin 1 g in dextrose 5 % 250 mL IVPB (ready to mix system)     Current Outpatient Prescriptions on File Prior to Encounter   Medication Sig    aspirin (ECOTRIN) 81 MG EC tablet Take 1 tablet (81 mg total) by mouth once daily.    atorvastatin (LIPITOR) 10 MG tablet TAKE ONE TABLET BY MOUTH EVERY DAY    buPROPion (WELLBUTRIN XL) 300 MG 24 hr tablet Take 1 tablet (300 mg total) by mouth once daily.    cephALEXin (KEFLEX) 500 MG capsule TAKE ONE CAPSULE BY MOUTH EVERY TWELVE HOURS    ciprofloxacin HCl (CIPRO) 500 MG tablet Take 1 tablet (500 mg total) by mouth 2 (two) times daily.    diazepam (VALIUM) 2 MG tablet     hydrocodone-acetaminophen 5-325mg (NORCO) 5-325 mg per tablet Take 1 tablet by mouth every 8 (eight) hours as needed for Pain (As needed for severe 10 out of 10 pain).    levothyroxine (SYNTHROID) 75 MCG tablet TAKE ONE TABLET BY MOUTH EVERY DAY BEFORE BREAKFAST    lisinopril (PRINIVIL,ZESTRIL) 20 MG tablet Take 2 tablets by mouth daily (Patient taking differently: 20 mg once daily. Take 2 tablets by mouth daily)    metoprolol succinate (TOPROL-XL) 25 MG 24 hr tablet Take 1 tablet (25 mg total) by mouth once daily.    metronidazole (FLAGYL) 500 MG tablet Take 1 tablet (500 mg total) by mouth every 6 (six) hours.     nystatin-triamcinolone (MYCOLOG II) cream     [] ondansetron (ZOFRAN-ODT) 8 MG TbDL Take 1 tablet (8 mg total) by mouth every 6 (six) hours as needed.    promethazine (PHENERGAN) 25 MG suppository Place 1 suppository (25 mg total) rectally every 6 (six) hours as needed (Use if vomiting is not controlled with oral medication).    sertraline (ZOLOFT) 50 MG tablet Take 1 tablet (50 mg total) by mouth once daily.    triamcinolone acetonide 0.1% (KENALOG) 0.1 % cream      Family History     None        Social History Main Topics    Smoking status: Never Smoker    Smokeless tobacco: Never Used    Alcohol use Yes      Comment: 2-3 X'S A WEEK    Drug use: No    Sexual activity: Not on file     Review of Systems   Constitution: Negative for decreased appetite.   HENT: Negative for ear discharge.    Eyes: Negative for blurred vision.   Endocrine: Negative for polyphagia.   Skin: Negative for nail changes.   Neurological: Negative for aphonia.   Psychiatric/Behavioral: Negative for hallucinations.     Objective:     Vital Signs (Most Recent):  Temp: 98.4 °F (36.9 °C) (17 1149)  Pulse: 93 (17 1149)  Resp: 18 (17 1149)  BP: 135/65 (17 1149)  SpO2: 96 % (17 1149) Vital Signs (24h Range):  Temp:  [98.2 °F (36.8 °C)-99.5 °F (37.5 °C)] 98.4 °F (36.9 °C)  Pulse:  [] 93  Resp:  [18-20] 18  SpO2:  [94 %-98 %] 96 %  BP: (122-189)/(56-84) 135/65     Weight: 98.2 kg (216 lb 8 oz)  Body mass index is 32.44 kg/m².    SpO2: 96 %         Intake/Output Summary (Last 24 hours) at 17 1338  Last data filed at 17 1000   Gross per 24 hour   Intake             1190 ml   Output             1650 ml   Net             -460 ml       Lines/Drains/Airways     Peripheral Intravenous Line                 Peripheral IV - Single Lumen 17 1159 Right Antecubital 1 day         Peripheral IV - Single Lumen 17 1423 Left Antecubital less than 1 day                Physical Exam    Constitutional: He is oriented to person, place, and time. He appears well-developed and well-nourished.   HENT:   Head: Normocephalic and atraumatic.   Eyes: Conjunctivae are normal. Pupils are equal, round, and reactive to light.   Neck: Normal range of motion. Neck supple.   Cardiovascular: Normal rate and intact distal pulses.    Murmur heard.  High-pitched blowing holosystolic murmur is present with a grade of 2/6  at the apex  Pulmonary/Chest: Effort normal and breath sounds normal.   Abdominal: Soft. Bowel sounds are normal.   Musculoskeletal: Normal range of motion.   Neurological: He is alert and oriented to person, place, and time.   Skin: Skin is warm and dry.       Significant Labs: All pertinent lab results from the last 24 hours have been reviewed.    Significant Imaging: X-Ray: CXR: X-Ray Chest 1 View (CXR): No results found for this visit on 07/13/17.  Assessment and Plan:     Active Diagnoses:    Diagnosis Date Noted POA    PRINCIPAL PROBLEM:  Positive blood cultures [R78.81] 07/13/2017 Yes    Diverticulitis of large intestine without bleeding [K57.32] 07/13/2017 Yes    Depression [F32.9] 11/01/2016 Yes    Chronic kidney disease, stage III (moderate) [N18.3] 02/03/2016 Yes    Hypothyroidism [E03.9] 02/03/2016 Yes    History of colon cancer [Z85.038] 02/03/2016 Yes    Hyperlipidemia [E78.5] 01/14/2016 Yes    S/P CABG (coronary artery bypass graft) [Z95.1] 01/06/2016 Not Applicable    S/P AVR [Z95.2] 01/06/2016 Not Applicable    Chronic bacterial endocarditis [I33.0] 12/08/2015 Yes      Problems Resolved During this Admission:    Diagnosis Date Noted Date Resolved POA       VTE Risk Mitigation         Ordered     heparin (porcine) injection 5,000 Units  Every 8 hours     Route:  Subcutaneous        07/14/17 0640     Place WALDO hose  Until discontinued      07/13/17 1843     Place sequential compression device  Until discontinued      07/13/17 1843        Echo 7/14/17    1 - Normal left  ventricular systolic function (EF 55-60%).     2 - Aortic valve bioprosthesis effective prosthetic valve area corrected for BSA is 1.34 cm2.     3 - Pulmonary hypertension. The estimated PA systolic pressure is 49 mmHg.     4 - Evidence of mitral vegetation - similar in appearance to echo done 1/25/16.     5 - Moderate mitral regurgitation.     6 - Mild tricuspid regurgitation.     Endocarditis - partially treated now with recurrent positive blood cultures. MV vegetation stable in size with moderate MR, no AVR vegetation seen. Will plan for repeat KHALIDA Monday. Abx per ID. Not deemed to be a surgical candidate at [facility]  AVR - bioprosthetic 2014 - adequate function on echo  CAD/CABG 2014  HTN  HLD  Hx colon CA  Hx TIA    Thank you for your consult. I will follow-up with patient. Please contact us if you have any additional questions.    Gurinder Pedersen MD  Cardiology   [facility] [place]

## 2017-07-14 NOTE — NURSING
Patient returned and complains of slight nausea. States has been feeling this way for the last few days. Will offer zofran as ordered. [place]

## 2017-07-14 NOTE — CONSULTS
Consult Note  Infectious Disease    Consult Requested By: Marguerite Arana MD    Reason for Consult: staph aureus sepsis and strep viridans sepsis    SUBJECTIVE:     History of Present Illness:  Patient is a 77 y.o. male presents with  Abdominal pain. He was seen in ed on 7/11/17 with bilateral lower quadrant pain and was diagnosed with acute diverticulitis when a ct abdomen showed mild sigmoid stranding. He was rx with cipro and flagyl and did not improve. He now returns with decreasing abdominal pain and fatigue. He denies fever, sobar or chest pains. 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 He developed mitral regurgitation and several episodes of chf.he went home with hospice after he declined operative intervention. He sought a second opinion at [facility] and was declined as a surgical candidate. Repeat blood cultures on 12/8/15,6/28/16,10/3/16 were all negative. He was comitted to 6 weeks of iv rocephin and stopped iv abx 1/22/16. He was then placed on po keflex 500 mg q 12 hours and apparently has continued that modality but has missed the occasional dose. He was last seen by an id np at [facility] in October 2016. His tte on 12/19/15 and 1/25/16 continued to show a small mitral valve mass with mr- this was felt to have been sterilised and no surgery was undertaken.  His blood cultures form 7/11/17 now are positive for a staph aureus and a strep viridans. His repeat blood cultures taken while on antibiotics are negative form 7/13/17.    Past Medical History:   Diagnosis Date    MI, old 2014     Past Surgical History:   Procedure Laterality Date    AORTIC VALVE REPLACEMENT  2014    CARDIAC SURGERY      CARDIAC VALVE SURGERY      Bio AVR 2014    COLON SURGERY  2000    CORONARY ARTERY BYPASS GRAFT  2014    x2    TONSILLECTOMY       History reviewed. No pertinent family  history.  Social History   Substance Use Topics    Smoking status: Never Smoker    Smokeless tobacco: Never Used    Alcohol use Yes      Comment: 2-3 X'S A WEEK       Review of patient's allergies indicates:  No Known Allergies     Antibiotics     Start     Stop Route Frequency Ordered    07/14/17 1400  vancomycin (VANCOCIN) 1,500 mg in dextrose 5 % 250 mL IVPB  (Vancomycin IVPB with levels panel)      -- IV Every 24 hours (non-standard times) 07/13/17 1918    07/14/17 0200  ciprofloxacin (CIPRO)400mg/200ml D5W IVPB 400 mg      -- IV Every 12 hours (non-standard times) 07/13/17 1842    07/13/17 2045  cefTRIAXone (ROCEPHIN) 2 g in dextrose 5 % 50 mL IVPB      -- IV Every 24 hours (non-standard times) 07/13/17 1937    07/13/17 1945  metronidazole IVPB 500 mg      -- IV Every 8 hours (non-standard times) 07/13/17 1842          Review of Systems:  Constitutional: no fever or chills  Eyes: no visual changes  ENT: no nasal congestion or sore throat  Respiratory: no cough or shortness of breath  Cardiovascular: no chest pain or palpitations  Gastrointestinal: positive for abdominal pain  Genitourinary: no hematuria or dysuria  Hematologic/Lymphatic: no easy bruising or lymphadenopathy  Musculoskeletal: no arthralgias or myalgias  Neurological: no seizures or tremors    OBJECTIVE:     Vital Signs (Most Recent)  Temp: 98.4 °F (36.9 °C) (07/14/17 1149)  Pulse: 93 (07/14/17 1149)  Resp: 18 (07/14/17 1149)  BP: 135/65 (07/14/17 1149)  SpO2: 96 % (07/14/17 1149)    Temperature Range Min/Max (Last 24H):  Temp:  [98.2 °F (36.8 °C)-99.5 °F (37.5 °C)]     Physical Exam:  General: well developed, well nourished  HENT: Head:normocephalic, atraumatic. Ears:not examined. Nose: Nares normal. Septum midline. Mucosa normal. No drainage or sinus tenderness., no discharge. Throat: lips, mucosa, and tongue normal; teeth and gums normal and no throat erythema.  Eyes: conjunctivae/corneas clear. PERRL.   Neck: supple, symmetrical, trachea  midline, no JVD and thyroid not enlarged, symmetric, no tenderness/mass/nodules  Lungs:  clear to auscultation bilaterally and normal respiratory effort  Cardiovascular: Heart: loud psm lse 4/6. Chest Wall: no tenderness. Extremities: no cyanosis or edema, or clubbing. Pulses: 2+ and symmetric.  Abdomen/Rectal: Abdomen: soft, non-tender non-distented; bowel sounds normal; no masses,  no organomegaly. Rectal: not examined  Skin: Skin color, texture, turgor normal. No rashes or lesions  Musculoskeletal:no clubbing, cyanosis  Lymph Nodes: No cervical or supraclavicular adenopathy    Laboratory:  CBC    Recent Labs  Lab 07/14/17  0520   WBC 17.18*   RBC 3.91*   HGB 10.4*   HCT 32.3*        BMP    Recent Labs  Lab 07/14/17  0520   CO2 22*   BUN 18   CREATININE 1.4   CALCIUM 8.9       Recent Labs  Lab 07/11/17  1816   COLORU Yellow   CLARITYU Clear   SPECGRAV 1.025   NITRITE Negative*   LEUKOCYTESUR Negative*   UROBILINOGEN 0.2     Microbiology Results (last 7 days)     ** No results found for the last 168 hours. **          Diagnostic Results:  Labs: Reviewed  X-Ray: Reviewed  Echo: Reviewed    ASSESSMENT/PLAN:     Active Hospital Problems    Diagnosis  POA    *Positive blood cultures [R78.81]  Yes    Diverticulitis of large intestine without bleeding [K57.32]  Yes    Depression [F32.9]  Yes    Chronic kidney disease, stage III (moderate) [N18.3]  Yes    Hypothyroidism [E03.9]  Yes    History of colon cancer [Z85.038]  Yes    Hyperlipidemia [E78.5]  Yes    S/P CABG (coronary artery bypass graft) [Z95.1]  Not Applicable    S/P AVR [Z95.2]  Not Applicable    Chronic bacterial endocarditis [I33.0]  Yes      Resolved Hospital Problems    Diagnosis Date Resolved POA   No resolved problems to display.       1. Staph aurues and strep viridans in blood. He has a prosthetic aortic valve and has had ? Partially sterilised vegetation on mitral valve since 2015'    He is apparently not a surgical candidate  He has  not had staph aureus in blood before  Suggest   Sai  Repeat ct abdomen and pelvis to ensure no abscess etc  Iv rocephin and vancomycin pending sensitivities  Will likely need 6 weeks of iv abx again, no guarantee of success

## 2017-07-14 NOTE — PLAN OF CARE
07/14/17 1556   Discharge Assessment   Assessment Type Discharge Planning Assessment   Confirmed/corrected address and phone number on facesheet? Yes   Assessment information obtained from? Patient   Expected Length of Stay (days) 3   Communicated expected length of stay with patient/caregiver yes   Prior to hospitilization cognitive status: Alert/Oriented   Prior to hospitalization functional status: Independent   Current cognitive status: Alert/Oriented   Current Functional Status: Independent   Arrived From home or self-care   Lives With spouse   Able to Return to Prior Arrangements yes   Who are your caregiver(s) and their phone number(s)? wife available to help at home   Patient's perception of discharge disposition home or selfcare   Readmission Within The Last 30 Days no previous admission in last 30 days   Patient currently being followed by outpatient case management? No   Patient currently receives home health services? No   Does the patient currently use HME? No   Patient currently receives private duty nursing? N/A   Patient currently receives any other outside agency services? No   Equipment Currently Used at Home none   Do you have any problems affording any of your prescribed medications? No   Is the patient taking medications as prescribed? yes   Does the patient have transportation to healthcare appointments? Yes   Transportation Available car   On Dialysis? No   Does the patient receive services at the Coumadin Clinic? No   Discharge Plan A Home   Discharge Plan B (Patient may need HH with IV infusion at discharge)   Patient/Family In Agreement With Plan yes     [facility] - [place] - [address]  [address]  [place] [id number]  Phone: [phone] Fax: [phone]      TN to patient's room to discuss Helping the patient manage care at home.   TN/ARIS roll explained to pt.  Teach back method used.  TN's name and contact info placed on white board.   "Pt/family encouraged to call for any problems/concerns with DC. "Discharge planning begins on Admission" pamphlet discussed and placed in "My Health Packet" and placed at bedside.   "

## 2017-07-14 NOTE — PLAN OF CARE
Problem: Patient Care Overview  Goal: Plan of Care Review  No falls or injury. Fall and safety precautions maintained. Afebrile. Antibiotics per orders. Most recent blood cultures positive.

## 2017-07-14 NOTE — PLAN OF CARE
Problem: Patient Care Overview  Goal: Plan of Care Review  Patient resting. No distress. IV antibiotics noted. 2 D Echo today. Call light in reach. Will continue to monitor. [place]

## 2017-07-14 NOTE — ASSESSMENT & PLAN NOTE
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 Per ID documentation 12/2015 [facility] KHALIDA showed vegetation on aortic and mitral valve.     Per their discharge summary, cultures had cleared at time of discharge on 11/27.  1/25/16 TTE shows mobile mass on mitral valve and resolved vegetation on aortic valve. Patient was treated with IV rocephin x 6 weeks then placed on suppressive antibiotic Keflex and plan was for repeat 2 D echo in Jan 2017 however patient have missed appointment with ID.CT  had no plan for intervention, Obtain 2 D echo and antibiotics as above and inflammatory markers. Consulted ID,may need be transferred to [facility].

## 2017-07-14 NOTE — ASSESSMENT & PLAN NOTE
Appears to be resolving as no further abdominal pain or NVD. Had formed BM yesterday,. Continue flagyl and cipro. Need repeat colonoscopy as out patient after infectious process resolve.

## 2017-07-14 NOTE — ASSESSMENT & PLAN NOTE
7/11 blood culture 2/4 bottles positive for GPC staph and strep. Repeat blood culture 7/13 pending . Continue Cipro/flagyl IV. Start  Rocephin (concern for persistent strep viridan endocarditis as history of not taking Keflex as instructed) and vancomycin. Hold home keflex for now. Obtain CRP and SED  Rate. Consult infectious disease. See also Endocarditis. Suspect source is endocarditis and not GI tract,will discus with ID.

## 2017-07-15 PROBLEM — B95.61 BACTEREMIA DUE TO STAPHYLOCOCCUS AUREUS: Status: ACTIVE | Noted: 2017-07-15

## 2017-07-15 PROBLEM — R78.81 BACTEREMIA DUE TO STAPHYLOCOCCUS AUREUS: Status: ACTIVE | Noted: 2017-07-15

## 2017-07-15 PROBLEM — I05.9 ENDOCARDITIS OF MITRAL VALVE: Status: ACTIVE | Noted: 2017-07-15

## 2017-07-15 LAB
ANION GAP SERPL CALC-SCNC: 9 MMOL/L
BACTERIA BLD CULT: NORMAL
BASOPHILS # BLD AUTO: 0.03 K/UL
BASOPHILS NFR BLD: 0.2 %
BUN SERPL-MCNC: 14 MG/DL
CALCIUM SERPL-MCNC: 9 MG/DL
CHLORIDE SERPL-SCNC: 104 MMOL/L
CO2 SERPL-SCNC: 21 MMOL/L
CREAT SERPL-MCNC: 1.3 MG/DL
DIFFERENTIAL METHOD: ABNORMAL
EOSINOPHIL # BLD AUTO: 0.8 K/UL
EOSINOPHIL NFR BLD: 5.4 %
ERYTHROCYTE [DISTWIDTH] IN BLOOD BY AUTOMATED COUNT: 15.6 %
EST. GFR  (AFRICAN AMERICAN): >60 ML/MIN/1.73 M^2
EST. GFR  (NON AFRICAN AMERICAN): 52 ML/MIN/1.73 M^2
GLUCOSE SERPL-MCNC: 140 MG/DL
HCT VFR BLD AUTO: 33 %
HGB BLD-MCNC: 10.4 G/DL
LYMPHOCYTES # BLD AUTO: 1.4 K/UL
LYMPHOCYTES NFR BLD: 9.5 %
MCH RBC QN AUTO: 26.6 PG
MCHC RBC AUTO-ENTMCNC: 31.5 %
MCV RBC AUTO: 84 FL
MONOCYTES # BLD AUTO: 2 K/UL
MONOCYTES NFR BLD: 13.6 %
NEUTROPHILS # BLD AUTO: 10.6 K/UL
NEUTROPHILS NFR BLD: 71.3 %
PLATELET # BLD AUTO: 276 K/UL
PMV BLD AUTO: 10.8 FL
POTASSIUM SERPL-SCNC: 4.1 MMOL/L
RBC # BLD AUTO: 3.91 M/UL
SODIUM SERPL-SCNC: 134 MMOL/L
WBC # BLD AUTO: 14.86 K/UL

## 2017-07-15 PROCEDURE — 36415 COLL VENOUS BLD VENIPUNCTURE: CPT

## 2017-07-15 PROCEDURE — 85025 COMPLETE CBC W/AUTO DIFF WBC: CPT

## 2017-07-15 PROCEDURE — 25000003 PHARM REV CODE 250: Performed by: NURSE PRACTITIONER

## 2017-07-15 PROCEDURE — 80048 BASIC METABOLIC PNL TOTAL CA: CPT

## 2017-07-15 PROCEDURE — 63600175 PHARM REV CODE 636 W HCPCS: Performed by: NURSE PRACTITIONER

## 2017-07-15 PROCEDURE — 99232 SBSQ HOSP IP/OBS MODERATE 35: CPT | Mod: ,,, | Performed by: INTERNAL MEDICINE

## 2017-07-15 PROCEDURE — 12000002 HC ACUTE/MED SURGE SEMI-PRIVATE ROOM

## 2017-07-15 PROCEDURE — 25000003 PHARM REV CODE 250: Performed by: HOSPITALIST

## 2017-07-15 RX ADMIN — HEPARIN SODIUM 5000 UNITS: 5000 INJECTION, SOLUTION INTRAVENOUS; SUBCUTANEOUS at 03:07

## 2017-07-15 RX ADMIN — BUPROPION HYDROCHLORIDE 300 MG: 150 TABLET, FILM COATED, EXTENDED RELEASE ORAL at 09:07

## 2017-07-15 RX ADMIN — LEVOTHYROXINE SODIUM 75 MCG: 75 TABLET ORAL at 05:07

## 2017-07-15 RX ADMIN — ATORVASTATIN CALCIUM 10 MG: 10 TABLET, FILM COATED ORAL at 09:07

## 2017-07-15 RX ADMIN — SERTRALINE HYDROCHLORIDE 50 MG: 50 TABLET ORAL at 09:07

## 2017-07-15 RX ADMIN — HEPARIN SODIUM 5000 UNITS: 5000 INJECTION, SOLUTION INTRAVENOUS; SUBCUTANEOUS at 09:07

## 2017-07-15 RX ADMIN — VANCOMYCIN HYDROCHLORIDE 1500 MG: 1 INJECTION, POWDER, LYOPHILIZED, FOR SOLUTION INTRAVENOUS at 03:07

## 2017-07-15 RX ADMIN — METOPROLOL SUCCINATE 25 MG: 25 TABLET, EXTENDED RELEASE ORAL at 09:07

## 2017-07-15 RX ADMIN — CEFTRIAXONE 2 G: 2 INJECTION, SOLUTION INTRAVENOUS at 09:07

## 2017-07-15 RX ADMIN — LISINOPRIL 20 MG: 20 TABLET ORAL at 09:07

## 2017-07-15 RX ADMIN — ASPIRIN 81 MG: 81 TABLET, COATED ORAL at 09:07

## 2017-07-15 RX ADMIN — PANTOPRAZOLE SODIUM 40 MG: 40 TABLET, DELAYED RELEASE ORAL at 09:07

## 2017-07-15 RX ADMIN — HEPARIN SODIUM 5000 UNITS: 5000 INJECTION, SOLUTION INTRAVENOUS; SUBCUTANEOUS at 05:07

## 2017-07-15 NOTE — PROGRESS NOTES
"Rolo Blanca is a 77 y.o. male patient.    Active Hospital Problems    Diagnosis  POA    Bacteremia due to Staphylococcus aureus [R78.81]  Yes    Diverticulitis of large intestine without bleeding [K57.32]  Yes    Depression [F32.9]  Yes    Chronic kidney disease, stage III (moderate) [N18.3]  Yes    Hypothyroidism [E03.9]  Yes    History of colon cancer [Z85.038]  Yes    Hyperlipidemia [E78.5]  Yes    S/P CABG (coronary artery bypass graft) [Z95.1]  Not Applicable    S/P AVR [Z95.2]  Not Applicable    Endocarditis of mitral valve [I05.8]  Yes      Resolved Hospital Problems    Diagnosis Date Resolved POA   No resolved problems to display.     Temp: 97.8 °F (36.6 °C) (07/15/17 0752)  Pulse: 107 (07/15/17 0800)  Resp: 19 (07/15/17 0752)  BP: (!) 173/84 (07/15/17 0752)  SpO2: 95 % (07/15/17 0752)  Weight: 98.2 kg (216 lb 8 oz) (07/14/17 0500)  Height: 5' 8.5" (174 cm) (07/13/17 1859)    Subjective:  Symptoms:  Stable.    Diet:  Poor intake.      Objective:  Vital signs: (most recent): Blood pressure (!) 173/84, pulse 107, temperature 97.8 °F (36.6 °C), temperature source Oral, resp. rate 19, height 5' 8.5" (1.74 m), weight 98.2 kg (216 lb 8 oz), SpO2 95 %.    Lungs:  Normal effort and normal respiratory rate.    Heart: Normal rate.  Regular rhythm.      Assessment & Plan     Echo 7/14/17    1 - Normal left ventricular systolic function (EF 55-60%).     2 - Aortic valve bioprosthesis effective prosthetic valve area corrected for BSA is 1.34 cm2.     3 - Pulmonary hypertension. The estimated PA systolic pressure is 49 mmHg.     4 - Evidence of mitral vegetation - similar in appearance to echo done 1/25/16.     5 - Moderate mitral regurgitation.     6 - Mild tricuspid regurgitation.      Endocarditis - partially treated now with recurrent positive blood cultures. MV vegetation stable in size with moderate MR, no AVR vegetation seen. Will plan for repeat KHALIDA Monday. Abx per ID. Not deemed to be a surgical " candidate at [facility]  AVR - bioprosthetic 2014 - adequate function on echo  CAD/CABG 2014  HTN  HLD  Hx colon CA  Hx TIA    Gurinder Pedersen MD  7/15/2017

## 2017-07-15 NOTE — CONSULTS
Consult Note  Infectious Disease    Consult Requested By: Marguerite Arana MD    Reason for Consult: staph aureus sepsis and strep viridans sepsis    SUBJECTIVE:     History of Present Illness:  Patient is a 77 y.o. male presents with  Abdominal pain. He was seen in ed on 7/11/17 with bilateral lower quadrant pain and was diagnosed with acute diverticulitis when a ct abdomen showed mild sigmoid stranding. He was rx with cipro and flagyl and did not improve. He now returns with decreasing abdominal pain and fatigue. He denies fever, sobar or chest pains. 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 He developed mitral regurgitation and several episodes of chf.he went home with hospice after he declined operative intervention. He sought a second opinion at [facility] and was declined as a surgical candidate. Repeat blood cultures on 12/8/15,6/28/16,10/3/16 were all negative. He was comitted to 6 weeks of iv rocephin and stopped iv abx 1/22/16. He was then placed on po keflex 500 mg q 12 hours and apparently has continued that modality but has missed the occasional dose. He was last seen by an id np at [facility] in October 2016. His tte on 12/19/15 and 1/25/16 continued to show a small mitral valve mass with mr- this was felt to have been sterilised and no surgery was undertaken.  His blood cultures form 7/11/17 now are positive for a staph aureus and a strep viridans. His repeat blood cultures taken while on antibiotics are negative form 7/13/17.    Past Medical History:   Diagnosis Date    MI, old 2014     Past Surgical History:   Procedure Laterality Date    AORTIC VALVE REPLACEMENT  2014    CARDIAC SURGERY      CARDIAC VALVE SURGERY      Bio AVR 2014    COLON SURGERY  2000    CORONARY ARTERY BYPASS GRAFT  2014    x2    TONSILLECTOMY       History reviewed. No pertinent family  history.  Social History   Substance Use Topics    Smoking status: Never Smoker    Smokeless tobacco: Never Used    Alcohol use Yes      Comment: 2-3 X'S A WEEK       Review of patient's allergies indicates:  No Known Allergies     Antibiotics     Start     Stop Route Frequency Ordered    07/14/17 1400  vancomycin (VANCOCIN) 1,500 mg in dextrose 5 % 250 mL IVPB  (Vancomycin IVPB with levels panel)      -- IV Every 24 hours (non-standard times) 07/13/17 1918 07/13/17 2045  cefTRIAXone (ROCEPHIN) 2 g in dextrose 5 % 50 mL IVPB      -- IV Every 24 hours (non-standard times) 07/13/17 1937          Review of Systems:  Constitutional: no fever or chills  Eyes: no visual changes  ENT: no nasal congestion or sore throat  Respiratory: no cough or shortness of breath  Cardiovascular: no chest pain or palpitations  Gastrointestinal: positive for abdominal pain  Genitourinary: no hematuria or dysuria  Hematologic/Lymphatic: no easy bruising or lymphadenopathy  Musculoskeletal: no arthralgias or myalgias  Neurological: no seizures or tremors    OBJECTIVE:     Vital Signs (Most Recent)  Temp: 97.8 °F (36.6 °C) (07/15/17 0752)  Pulse: 107 (07/15/17 0800)  Resp: 19 (07/15/17 0752)  BP: (!) 173/84 (07/15/17 0752)  SpO2: 95 % (07/15/17 0752)    Temperature Range Min/Max (Last 24H):  Temp:  [97.8 °F (36.6 °C)-98.9 °F (37.2 °C)]     Physical Exam:  General: well developed, well nourished  HENT: Head:normocephalic, atraumatic. Ears:not examined. Nose: Nares normal. Septum midline. Mucosa normal. No drainage or sinus tenderness., no discharge. Throat: lips, mucosa, and tongue normal; teeth and gums normal and no throat erythema.  Eyes: conjunctivae/corneas clear. PERRL.   Neck: supple, symmetrical, trachea midline, no JVD and thyroid not enlarged, symmetric, no tenderness/mass/nodules  Lungs:  clear to auscultation bilaterally and normal respiratory effort  Cardiovascular: Heart: loud psm lse 4/6. Chest Wall: no tenderness.  Extremities: no cyanosis or edema, or clubbing. Pulses: 2+ and symmetric.  Abdomen/Rectal: Abdomen: soft, non-tender non-distented; bowel sounds normal; no masses,  no organomegaly. Rectal: not examined  Skin: Skin color, texture, turgor normal. No rashes or lesions  Musculoskeletal:no clubbing, cyanosis  Lymph Nodes: No cervical or supraclavicular adenopathy    Laboratory:  CBC    Recent Labs  Lab 07/15/17  0454   WBC 14.86*   RBC 3.91*   HGB 10.4*   HCT 33.0*        BMP    Recent Labs  Lab 07/15/17  0454   CO2 21*   BUN 14   CREATININE 1.3   CALCIUM 9.0       Recent Labs  Lab 07/11/17  1816   COLORU Yellow   CLARITYU Clear   SPECGRAV 1.025   NITRITE Negative*   LEUKOCYTESUR Negative*   UROBILINOGEN 0.2     Microbiology Results (last 7 days)     ** No results found for the last 168 hours. **          Diagnostic Results:  Labs: Reviewed  X-Ray: Reviewed  Echo: Reviewed    ASSESSMENT/PLAN:     Active Hospital Problems    Diagnosis  POA    Bacteremia due to Staphylococcus aureus [R78.81]  Yes    Diverticulitis of large intestine without bleeding [K57.32]  Yes    Depression [F32.9]  Yes    Chronic kidney disease, stage III (moderate) [N18.3]  Yes    Hypothyroidism [E03.9]  Yes    History of colon cancer [Z85.038]  Yes    Hyperlipidemia [E78.5]  Yes    S/P CABG (coronary artery bypass graft) [Z95.1]  Not Applicable    S/P AVR [Z95.2]  Not Applicable    Endocarditis of mitral valve [I05.8]  Yes      Resolved Hospital Problems    Diagnosis Date Resolved POA   No resolved problems to display.       1. Staph aureus(mrsa) and strep salivarius in blood. He has a prosthetic aortic valve and has had ? Partially sterilised vegetation on mitral valve since 2015    He is apparently not a surgical candidate  He has not had staph aureus in blood before  Suggest   Sai  Repeat ct abdomen and pelvis to ensure no abscess etc- looks better  Iv rocephin and vancomycin pending sensitivities  Will likely need 6 weeks of iv  abx again, no guarantee of success   I cannot rule out contaminated cultures and have to rx as with prosthetic aortic valve and mitral vegetation

## 2017-07-15 NOTE — ASSESSMENT & PLAN NOTE
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 Per ID documentation 12/2015 [facility] KHALIDA showed vegetation on aortic and mitral valve.     Per their discharge summary, cultures had cleared at time of discharge on 11/27.  1/25/16 TTE shows mobile mass on mitral valve and resolved vegetation on aortic valve. Patient was treated with IV rocephin x 6 weeks then placed on suppressive antibiotic Keflex and plan was for repeat 2 D echo in Jan 2017 however patient have missed appointment with ID.he was also non compliant with Keflex.CT  had no plan for intervention, Obtained 2 D echo,show same seize MV regurgitation, inflammatory markers elevated, Consulted ID,on Rocephin and vanc.cardiology planing for KHALIDA on Monday.patient has been informed may no warranty for resolution of infection.

## 2017-07-15 NOTE — ASSESSMENT & PLAN NOTE
On 7.11.17 ,Source infected mitral valves,on Vanc.repeat blood culture on 7.13.17 no growth sofar.

## 2017-07-15 NOTE — PROGRESS NOTES
[facility] Hospital Medicine  Progress Note    Patient Name: Rolo Blanca  MRN: [id number]  Patient Class: IP- Inpatient   Admission Date: 7/13/2017  Length of Stay: 2 days  Attending Physician: Marguerite Arana MD  Primary Care Provider: Scott Valadez MD        Subjective:     Principal Problem:<principal problem not specified>    HPI:  The patient is a 76 y/o male with a significant history of colon cancer 2000 sp partial colon resection, hypertension, hyperlipidemia, TIAs, CAD s/p MI/CABG 2014, AVR- bioprosthetic valve and AVR and MV endocarditis 2015 now on suppressive antibiotics (currently followed by ID -[facility] Adarsh Howell) who was told by [place] [facility] to return to ED for 7/11positive blood cultures 2/4 (one bottle GPC chain strep, one bottle GPC cluster staph). Patient then transferred to [facility] for further evaluation. Patient initially went to [place] ED on 7/11 with complaints of abdominal pain, nausea, and vomiting which started 3 days ago then found to have on CT abdomen  sigmoid diverticulitis then treated with cipro and metronidazole. Patient reported today abdominal pain and vomiting resolved and had a normal formed bowel movement. Last episode of nausea and vomiting was last night. Also c/o intermittent dizziness upon position changes but denies chest pain or  shortness of breath. He denies fever, chills, URI symptoms and headaches.     Family concern about suppressive antibiotic Keflex for two years after endocarditis episode. Patient missed ID appointment as plan was for repeat 2 D echo in Jan 2017 to evaluate for resolved vegetation on MV).       Hospital Course:  The patient is a 76 y/o male with a significant history of colon cancer 2000 sp partial colon resection, hypertension, hyperlipidemia, TIAs, CAD s/p MI/CABG 2014, AVR- bioprosthetic valve and AVR and MV endocarditis 2015 now on suppressive antibiotics (currently followed by ID -[facility] Adarsh Howell) who was told by  [place] ED to return to ED for 7/11positive blood cultures 2/4 (one bottle GPC chain strep, one bottle GPC cluster staph). Patient then transferred to [facility] for further evaluation!!!. Patient initially went to [place] ED on 7/11 with complaints of abdominal pain, nausea, and vomiting which started 3 days ago then found to have on CT abdomen  sigmoid diverticulitis then treated with cipro and metronidazole. Patient had some  abdominal pain and vomiting resolved and had a normal formed bowel movement. Last episode of nausea and vomiting was few days ago,he denies any nausea,vomitng at this time,abdopmional pain is resolved,repeat blood culture show no growth,he in on  broad spectrum IV Abx.echo show persistent mitral vegetation,same seize as before,AV is stable,per ID patient is on Vanc and rocephin,will have KHALIDA on Monday by cardiology.  Family concern about suppressive antibiotic Keflex for two years after endocarditis episode.he was non compliant with Keflex. Patient missed ID appointment as plan was for repeat 2 D echo in Jan 2017 to evaluate for resolved vegetation on MV.    Past Medical History:   Diagnosis Date    MI, old 2014       Past Surgical History:   Procedure Laterality Date    AORTIC VALVE REPLACEMENT  2014    CARDIAC SURGERY      CARDIAC VALVE SURGERY      Bio AVR 2014    COLON SURGERY  2000    CORONARY ARTERY BYPASS GRAFT  2014    x2    TONSILLECTOMY         Review of patient's allergies indicates:  No Known Allergies    No current facility-administered medications on file prior to encounter.      Current Outpatient Prescriptions on File Prior to Encounter   Medication Sig    aspirin (ECOTRIN) 81 MG EC tablet Take 1 tablet (81 mg total) by mouth once daily.    atorvastatin (LIPITOR) 10 MG tablet TAKE ONE TABLET BY MOUTH EVERY DAY    buPROPion (WELLBUTRIN XL) 300 MG 24 hr tablet Take 1 tablet (300 mg total) by mouth once daily.    cephALEXin (KEFLEX) 500 MG capsule TAKE ONE CAPSULE BY  MOUTH EVERY TWELVE HOURS    ciprofloxacin HCl (CIPRO) 500 MG tablet Take 1 tablet (500 mg total) by mouth 2 (two) times daily.    diazepam (VALIUM) 2 MG tablet     hydrocodone-acetaminophen 5-325mg (NORCO) 5-325 mg per tablet Take 1 tablet by mouth every 8 (eight) hours as needed for Pain (As needed for severe 10 out of 10 pain).    levothyroxine (SYNTHROID) 75 MCG tablet TAKE ONE TABLET BY MOUTH EVERY DAY BEFORE BREAKFAST    lisinopril (PRINIVIL,ZESTRIL) 20 MG tablet Take 2 tablets by mouth daily (Patient taking differently: 20 mg once daily. Take 2 tablets by mouth daily)    metoprolol succinate (TOPROL-XL) 25 MG 24 hr tablet Take 1 tablet (25 mg total) by mouth once daily.    metronidazole (FLAGYL) 500 MG tablet Take 1 tablet (500 mg total) by mouth every 6 (six) hours.    nystatin-triamcinolone (MYCOLOG II) cream     promethazine (PHENERGAN) 25 MG suppository Place 1 suppository (25 mg total) rectally every 6 (six) hours as needed (Use if vomiting is not controlled with oral medication).    sertraline (ZOLOFT) 50 MG tablet Take 1 tablet (50 mg total) by mouth once daily.    triamcinolone acetonide 0.1% (KENALOG) 0.1 % cream      Family History     None        Social History Main Topics    Smoking status: Never Smoker    Smokeless tobacco: Never Used    Alcohol use Yes      Comment: 2-3 X'S A WEEK    Drug use: No    Sexual activity: Not on file     Review of Systems   Constitutional: Negative for appetite change and chills.   HENT: Negative for congestion, ear discharge and rhinorrhea.    Eyes: Negative for pain, redness and itching.   Respiratory: Negative for cough, chest tightness, shortness of breath, wheezing and stridor.    Cardiovascular: Negative for chest pain and leg swelling.   Gastrointestinal: Negative for abdominal pain, constipation, diarrhea, nausea and vomiting.   Endocrine: Negative for cold intolerance and polydipsia.   Genitourinary: Negative for dysuria and hematuria.    Musculoskeletal: Negative for arthralgias, joint swelling and myalgias.   Skin: Negative for color change and pallor.   Neurological: Negative for syncope, light-headedness and headaches.   Psychiatric/Behavioral: Negative for confusion and hallucinations. The patient is not nervous/anxious.      Objective:     Vital Signs (Most Recent):  Temp: 98.3 °F (36.8 °C) (07/15/17 0424)  Pulse: 102 (07/15/17 0424)  Resp: 18 (07/15/17 0424)  BP: (!) 172/85 (07/15/17 0424)  SpO2: 95 % (07/15/17 0424) Vital Signs (24h Range):  Temp:  [98 °F (36.7 °C)-98.9 °F (37.2 °C)] 98.3 °F (36.8 °C)  Pulse:  [] 102  Resp:  [18-21] 18  SpO2:  [95 %-98 %] 95 %  BP: (135-172)/(61-85) 172/85     Weight: 98.2 kg (216 lb 8 oz)  Body mass index is 32.44 kg/m².    Physical Exam   Constitutional: He is oriented to person, place, and time. He appears well-developed and well-nourished. No distress.   HENT:   Head: Normocephalic and atraumatic.   Right Ear: External ear normal.   Left Ear: External ear normal.   Eyes: Conjunctivae and EOM are normal. Pupils are equal, round, and reactive to light.   Neck: Normal range of motion. Neck supple.   Cardiovascular: Normal rate and regular rhythm.    Murmur heard.   Systolic murmur is present   Pulmonary/Chest: Effort normal and breath sounds normal. He has no wheezes. He has no rales.   Abdominal: Soft. Bowel sounds are normal. There is tenderness in the left lower quadrant. There is no guarding.   Musculoskeletal: Normal range of motion.   Neurological: He is alert and oriented to person, place, and time. No cranial nerve deficit.   Skin: Skin is warm. He is not diaphoretic.   Psychiatric: He has a normal mood and affect. His behavior is normal. Judgment and thought content normal.   Vitals reviewed.       Significant Labs: Blood Culture:     Recent Labs  Lab 07/13/17  1159 07/13/17  1214   LABBLOO No Growth to date  No Growth to date No Growth to date  No Growth to date     BMP:     Recent  Labs  Lab 07/13/17  1736  07/15/17  0454     < > 140*   *  < > 134*   K 4.0  < > 4.1     < > 104   CO2 22*  < > 21*   BUN 21  < > 14   CREATININE 1.5*  < > 1.3   CALCIUM 8.8  < > 9.0   MG 2.0  --   --    < > = values in this interval not displayed.  CBC:     Recent Labs  Lab 07/13/17  1736 07/14/17  0520 07/15/17  0454   WBC 15.99* 17.18* 14.86*   HGB 11.0* 10.4* 10.4*   HCT 34.2* 32.3* 33.0*    220 276     Cardiac Markers: No results for input(s): CKMB, MYOGLOBIN, BNP, TROPISTAT in the last 48 hours.  Lactic Acid: No results for input(s): LACTATE in the last 48 hours.  Lipid Panel: No results for input(s): CHOL, HDL, LDLCALC, TRIG, CHOLHDL in the last 48 hours.  Magnesium:     Recent Labs  Lab 07/13/17  1736   MG 2.0       Significant Imaging: CXR showed mild cardiomegaly, suggestive of CHF.    Assessment/Plan:      Bacteremia due to Staphylococcus aureus    On 7.11.17 ,Source infected mitral valves,on Vanc.repeat blood culture on 7.13.17 no growth sofar.          Diverticulitis of large intestine without bleeding    Appears to be resolving as no further abdominal pain or NVD. Had formed BM .on IV abx.Need repeat colonoscopy as out patient after infectious process resolve.        Depression    Pt not compliant with Zoloft and Wellbutrin. Restart Zoloft and wellbutrin.           History of colon cancer    Pt had colonoscopy 2 years ago and reported unremarkable. Encourage follow up with GI.          Hypothyroidism    Continue Synthroid.  Lab Results   Component Value Date    TSH 1.730 02/06/2017             Chronic kidney disease, stage III (moderate)    Cr=1.8, BUN 23.GFR 44. Baseline Cr ~1.5. Continue IV fluids. Avoid nephrotoxic medications.        Hyperlipidemia    Pt not compliant with current statin regiment. Continue and encourage daily statin intake.   Lab Results   Component Value Date    LDLCALC 65.2 06/17/2017             S/P AVR    See above, endocarditis.        S/P CABG  (coronary artery bypass graft)    No acute issues. Continue ASA and statin.          Endocarditis of mitral valve      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 Per ID documentation 12/2015 [facility] KHALIDA showed vegetation on aortic and mitral valve.     Per their discharge summary, cultures had cleared at time of discharge on 11/27.  1/25/16 TTE shows mobile mass on mitral valve and resolved vegetation on aortic valve. Patient was treated with IV rocephin x 6 weeks then placed on suppressive antibiotic Keflex and plan was for repeat 2 D echo in Jan 2017 however patient have missed appointment with ID.he was also non compliant with Keflex.CT  had no plan for intervention, Obtained 2 D echo,show same seize MV regurgitation, inflammatory markers elevated, Consulted ID,on Rocephin and vanc.cardiology planing for KHALIDA on Monday.patient has been informed may no warranty for resolution of infection.          VTE Risk Mitigation         Ordered     heparin (porcine) injection 5,000 Units  Every 8 hours     Route:  Subcutaneous        07/14/17 0640     Place WALDO hose  Until discontinued      07/13/17 1843     Place sequential compression device  Until discontinued      07/13/17 1843          Marguerite Arana MD  Department of Hospital Medicine   [facility]

## 2017-07-15 NOTE — SUBJECTIVE & OBJECTIVE
Past Medical History:   Diagnosis Date    MI, old 2014       Past Surgical History:   Procedure Laterality Date    AORTIC VALVE REPLACEMENT  2014    CARDIAC SURGERY      CARDIAC VALVE SURGERY      Bio AVR 2014    COLON SURGERY  2000    CORONARY ARTERY BYPASS GRAFT  2014    x2    TONSILLECTOMY         Review of patient's allergies indicates:  No Known Allergies    No current facility-administered medications on file prior to encounter.      Current Outpatient Prescriptions on File Prior to Encounter   Medication Sig    aspirin (ECOTRIN) 81 MG EC tablet Take 1 tablet (81 mg total) by mouth once daily.    atorvastatin (LIPITOR) 10 MG tablet TAKE ONE TABLET BY MOUTH EVERY DAY    buPROPion (WELLBUTRIN XL) 300 MG 24 hr tablet Take 1 tablet (300 mg total) by mouth once daily.    cephALEXin (KEFLEX) 500 MG capsule TAKE ONE CAPSULE BY MOUTH EVERY TWELVE HOURS    ciprofloxacin HCl (CIPRO) 500 MG tablet Take 1 tablet (500 mg total) by mouth 2 (two) times daily.    diazepam (VALIUM) 2 MG tablet     hydrocodone-acetaminophen 5-325mg (NORCO) 5-325 mg per tablet Take 1 tablet by mouth every 8 (eight) hours as needed for Pain (As needed for severe 10 out of 10 pain).    levothyroxine (SYNTHROID) 75 MCG tablet TAKE ONE TABLET BY MOUTH EVERY DAY BEFORE BREAKFAST    lisinopril (PRINIVIL,ZESTRIL) 20 MG tablet Take 2 tablets by mouth daily (Patient taking differently: 20 mg once daily. Take 2 tablets by mouth daily)    metoprolol succinate (TOPROL-XL) 25 MG 24 hr tablet Take 1 tablet (25 mg total) by mouth once daily.    metronidazole (FLAGYL) 500 MG tablet Take 1 tablet (500 mg total) by mouth every 6 (six) hours.    nystatin-triamcinolone (MYCOLOG II) cream     promethazine (PHENERGAN) 25 MG suppository Place 1 suppository (25 mg total) rectally every 6 (six) hours as needed (Use if vomiting is not controlled with oral medication).    sertraline (ZOLOFT) 50 MG tablet Take 1 tablet (50 mg total) by mouth once  daily.    triamcinolone acetonide 0.1% (KENALOG) 0.1 % cream      Family History     None        Social History Main Topics    Smoking status: Never Smoker    Smokeless tobacco: Never Used    Alcohol use Yes      Comment: 2-3 X'S A WEEK    Drug use: No    Sexual activity: Not on file     Review of Systems   Constitutional: Negative for appetite change and chills.   HENT: Negative for congestion, ear discharge and rhinorrhea.    Eyes: Negative for pain, redness and itching.   Respiratory: Negative for cough, chest tightness, shortness of breath, wheezing and stridor.    Cardiovascular: Negative for chest pain and leg swelling.   Gastrointestinal: Negative for abdominal pain, constipation, diarrhea, nausea and vomiting.   Endocrine: Negative for cold intolerance and polydipsia.   Genitourinary: Negative for dysuria and hematuria.   Musculoskeletal: Negative for arthralgias, joint swelling and myalgias.   Skin: Negative for color change and pallor.   Neurological: Negative for syncope, light-headedness and headaches.   Psychiatric/Behavioral: Negative for confusion and hallucinations. The patient is not nervous/anxious.      Objective:     Vital Signs (Most Recent):  Temp: 98.3 °F (36.8 °C) (07/15/17 0424)  Pulse: 102 (07/15/17 0424)  Resp: 18 (07/15/17 0424)  BP: (!) 172/85 (07/15/17 0424)  SpO2: 95 % (07/15/17 0424) Vital Signs (24h Range):  Temp:  [98 °F (36.7 °C)-98.9 °F (37.2 °C)] 98.3 °F (36.8 °C)  Pulse:  [] 102  Resp:  [18-21] 18  SpO2:  [95 %-98 %] 95 %  BP: (135-172)/(61-85) 172/85     Weight: 98.2 kg (216 lb 8 oz)  Body mass index is 32.44 kg/m².    Physical Exam   Constitutional: He is oriented to person, place, and time. He appears well-developed and well-nourished. No distress.   HENT:   Head: Normocephalic and atraumatic.   Right Ear: External ear normal.   Left Ear: External ear normal.   Eyes: Conjunctivae and EOM are normal. Pupils are equal, round, and reactive to light.   Neck: Normal  range of motion. Neck supple.   Cardiovascular: Normal rate and regular rhythm.    Murmur heard.   Systolic murmur is present   Pulmonary/Chest: Effort normal and breath sounds normal. He has no wheezes. He has no rales.   Abdominal: Soft. Bowel sounds are normal. There is tenderness in the left lower quadrant. There is no guarding.   Musculoskeletal: Normal range of motion.   Neurological: He is alert and oriented to person, place, and time. No cranial nerve deficit.   Skin: Skin is warm. He is not diaphoretic.   Psychiatric: He has a normal mood and affect. His behavior is normal. Judgment and thought content normal.   Vitals reviewed.       Significant Labs: Blood Culture:     Recent Labs  Lab 07/13/17  1159 07/13/17  1214   LABBLOO No Growth to date  No Growth to date No Growth to date  No Growth to date     BMP:     Recent Labs  Lab 07/13/17  1736  07/15/17  0454     < > 140*   *  < > 134*   K 4.0  < > 4.1     < > 104   CO2 22*  < > 21*   BUN 21  < > 14   CREATININE 1.5*  < > 1.3   CALCIUM 8.8  < > 9.0   MG 2.0  --   --    < > = values in this interval not displayed.  CBC:     Recent Labs  Lab 07/13/17  1736 07/14/17  0520 07/15/17  0454   WBC 15.99* 17.18* 14.86*   HGB 11.0* 10.4* 10.4*   HCT 34.2* 32.3* 33.0*    220 276     Cardiac Markers: No results for input(s): CKMB, MYOGLOBIN, BNP, TROPISTAT in the last 48 hours.  Lactic Acid: No results for input(s): LACTATE in the last 48 hours.  Lipid Panel: No results for input(s): CHOL, HDL, LDLCALC, TRIG, CHOLHDL in the last 48 hours.  Magnesium:     Recent Labs  Lab 07/13/17  1736   MG 2.0       Significant Imaging: CXR showed mild cardiomegaly, suggestive of CHF.

## 2017-07-15 NOTE — PLAN OF CARE
Problem: Fall Risk (Adult)  Goal: Identify Related Risk Factors and Signs and Symptoms  Related risk factors and signs and symptoms are identified upon initiation of Human Response Clinical Practice Guideline (CPG)   Outcome: Ongoing (interventions implemented as appropriate)   07/14/17 0339   Fall Risk   Related Risk Factors (Fall Risk) age-related changes;environment unfamiliar;depression/anxiety   Signs and Symptoms (Fall Risk) presence of risk factors     Goal: Absence of Falls  Patient will demonstrate the desired outcomes by discharge/transition of care.   Outcome: Ongoing (interventions implemented as appropriate)   07/15/17 0506   Fall Risk (Adult)   Absence of Falls making progress toward outcome       Problem: Infection, Risk/Actual (Adult)  Goal: Identify Related Risk Factors and Signs and Symptoms  Related risk factors and signs and symptoms are identified upon initiation of Human Response Clinical Practice Guideline (CPG)   Outcome: Ongoing (interventions implemented as appropriate)   07/14/17 0339   Infection, Risk/Actual   Related Risk Factors (Infection, Risk/Actual) chronic illness/condition;age extremes   Signs and Symptoms (Infection, Risk/Actual) cultures positive     Goal: Infection Prevention/Resolution  Patient will demonstrate the desired outcomes by discharge/transition of care.   Outcome: Ongoing (interventions implemented as appropriate)   07/15/17 0506   Infection, Risk/Actual (Adult)   Infection Prevention/Resolution making progress toward outcome

## 2017-07-15 NOTE — ASSESSMENT & PLAN NOTE
Appears to be resolving as no further abdominal pain or NVD. Had formed BM .on IV abx.Need repeat colonoscopy as out patient after infectious process resolve.

## 2017-07-15 NOTE — NURSING
Patient care assumed and report received.Telemetry box and monitor correlating # [id number].Checked with off going nurse. Good waveform

## 2017-07-16 LAB
ANION GAP SERPL CALC-SCNC: 8 MMOL/L
BASOPHILS # BLD AUTO: 0.06 K/UL
BASOPHILS NFR BLD: 0.5 %
BUN SERPL-MCNC: 16 MG/DL
CALCIUM SERPL-MCNC: 9.1 MG/DL
CHLORIDE SERPL-SCNC: 102 MMOL/L
CO2 SERPL-SCNC: 23 MMOL/L
CREAT SERPL-MCNC: 1.2 MG/DL
DIFFERENTIAL METHOD: ABNORMAL
EOSINOPHIL # BLD AUTO: 0.7 K/UL
EOSINOPHIL NFR BLD: 5.5 %
ERYTHROCYTE [DISTWIDTH] IN BLOOD BY AUTOMATED COUNT: 15.6 %
EST. GFR  (AFRICAN AMERICAN): >60 ML/MIN/1.73 M^2
EST. GFR  (NON AFRICAN AMERICAN): 58 ML/MIN/1.73 M^2
GLUCOSE SERPL-MCNC: 117 MG/DL
HCT VFR BLD AUTO: 33.2 %
HGB BLD-MCNC: 10.7 G/DL
LYMPHOCYTES # BLD AUTO: 1.5 K/UL
LYMPHOCYTES NFR BLD: 11.4 %
MCH RBC QN AUTO: 27.2 PG
MCHC RBC AUTO-ENTMCNC: 32.2 %
MCV RBC AUTO: 84 FL
MONOCYTES # BLD AUTO: 2.2 K/UL
MONOCYTES NFR BLD: 16.5 %
NEUTROPHILS # BLD AUTO: 8.8 K/UL
NEUTROPHILS NFR BLD: 66.1 %
PLATELET # BLD AUTO: 289 K/UL
PMV BLD AUTO: 10.6 FL
POTASSIUM SERPL-SCNC: 4.3 MMOL/L
RBC # BLD AUTO: 3.94 M/UL
SODIUM SERPL-SCNC: 133 MMOL/L
VANCOMYCIN TROUGH SERPL-MCNC: 8.8 UG/ML
WBC # BLD AUTO: 13.31 K/UL

## 2017-07-16 PROCEDURE — 85025 COMPLETE CBC W/AUTO DIFF WBC: CPT

## 2017-07-16 PROCEDURE — 80202 ASSAY OF VANCOMYCIN: CPT

## 2017-07-16 PROCEDURE — 12000002 HC ACUTE/MED SURGE SEMI-PRIVATE ROOM

## 2017-07-16 PROCEDURE — 25000003 PHARM REV CODE 250: Performed by: HOSPITALIST

## 2017-07-16 PROCEDURE — 63600175 PHARM REV CODE 636 W HCPCS: Performed by: HOSPITALIST

## 2017-07-16 PROCEDURE — 25000003 PHARM REV CODE 250: Performed by: NURSE PRACTITIONER

## 2017-07-16 PROCEDURE — 80048 BASIC METABOLIC PNL TOTAL CA: CPT

## 2017-07-16 PROCEDURE — 99232 SBSQ HOSP IP/OBS MODERATE 35: CPT | Mod: ,,, | Performed by: INTERNAL MEDICINE

## 2017-07-16 PROCEDURE — 36415 COLL VENOUS BLD VENIPUNCTURE: CPT

## 2017-07-16 RX ORDER — SODIUM CHLORIDE 9 MG/ML
INJECTION, SOLUTION INTRAVENOUS ONCE
Status: CANCELLED | OUTPATIENT
Start: 2017-07-16

## 2017-07-16 RX ORDER — AMLODIPINE BESYLATE 5 MG/1
10 TABLET ORAL DAILY
Status: DISCONTINUED | OUTPATIENT
Start: 2017-07-16 | End: 2017-07-19

## 2017-07-16 RX ADMIN — AMLODIPINE BESYLATE 10 MG: 5 TABLET ORAL at 09:07

## 2017-07-16 RX ADMIN — LEVOTHYROXINE SODIUM 75 MCG: 75 TABLET ORAL at 05:07

## 2017-07-16 RX ADMIN — SERTRALINE HYDROCHLORIDE 50 MG: 50 TABLET ORAL at 09:07

## 2017-07-16 RX ADMIN — HEPARIN SODIUM 5000 UNITS: 5000 INJECTION, SOLUTION INTRAVENOUS; SUBCUTANEOUS at 05:07

## 2017-07-16 RX ADMIN — VANCOMYCIN HYDROCHLORIDE 1250 MG: 1 INJECTION, POWDER, LYOPHILIZED, FOR SOLUTION INTRAVENOUS at 02:07

## 2017-07-16 RX ADMIN — ATORVASTATIN CALCIUM 10 MG: 10 TABLET, FILM COATED ORAL at 09:07

## 2017-07-16 RX ADMIN — PANTOPRAZOLE SODIUM 40 MG: 40 TABLET, DELAYED RELEASE ORAL at 09:07

## 2017-07-16 RX ADMIN — LISINOPRIL 20 MG: 20 TABLET ORAL at 09:07

## 2017-07-16 RX ADMIN — ASPIRIN 81 MG: 81 TABLET, COATED ORAL at 09:07

## 2017-07-16 RX ADMIN — METOPROLOL SUCCINATE 25 MG: 25 TABLET, EXTENDED RELEASE ORAL at 09:07

## 2017-07-16 RX ADMIN — HEPARIN SODIUM 5000 UNITS: 5000 INJECTION, SOLUTION INTRAVENOUS; SUBCUTANEOUS at 02:07

## 2017-07-16 RX ADMIN — HEPARIN SODIUM 5000 UNITS: 5000 INJECTION, SOLUTION INTRAVENOUS; SUBCUTANEOUS at 09:07

## 2017-07-16 RX ADMIN — BUPROPION HYDROCHLORIDE 300 MG: 150 TABLET, FILM COATED, EXTENDED RELEASE ORAL at 09:07

## 2017-07-16 NOTE — CONSULTS
Consult Note  Infectious Disease    Consult Requested By: Marguerite Arana MD    Reason for Consult: staph aureus sepsis and strep viridans sepsis    SUBJECTIVE:     History of Present Illness:  Patient is a 77 y.o. male presents with  Abdominal pain. He was seen in ed on 7/11/17 with bilateral lower quadrant pain and was diagnosed with acute diverticulitis when a ct abdomen showed mild sigmoid stranding. He was rx with cipro and flagyl and did not improve. He now returns with decreasing abdominal pain and fatigue. He denies fever, sobar or chest pains. 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 He developed mitral regurgitation and several episodes of chf.he went home with hospice after he declined operative intervention. He sought a second opinion at [facility] and was declined as a surgical candidate. Repeat blood cultures on 12/8/15,6/28/16,10/3/16 were all negative. He was comitted to 6 weeks of iv rocephin and stopped iv abx 1/22/16. He was then placed on po keflex 500 mg q 12 hours and apparently has continued that modality but has missed the occasional dose. He was last seen by an id np at [facility] in October 2016. His tte on 12/19/15 and 1/25/16 continued to show a small mitral valve mass with mr- this was felt to have been sterilised and no surgery was undertaken.  His blood cultures form 7/11/17 now are positive for a staph aureus and a strep viridans. His repeat blood cultures taken while on antibiotics are negative form 7/13/17.    Past Medical History:   Diagnosis Date    MI, old 2014     Past Surgical History:   Procedure Laterality Date    AORTIC VALVE REPLACEMENT  2014    CARDIAC SURGERY      CARDIAC VALVE SURGERY      Bio AVR 2014    COLON SURGERY  2000    CORONARY ARTERY BYPASS GRAFT  2014    x2    TONSILLECTOMY       History reviewed. No pertinent family  history.  Social History   Substance Use Topics    Smoking status: Never Smoker    Smokeless tobacco: Never Used    Alcohol use Yes      Comment: 2-3 X'S A WEEK       Review of patient's allergies indicates:  No Known Allergies     Antibiotics     Start     Stop Route Frequency Ordered    07/14/17 1400  vancomycin (VANCOCIN) 1,500 mg in dextrose 5 % 250 mL IVPB  (Vancomycin IVPB with levels panel)      -- IV Every 24 hours (non-standard times) 07/13/17 1918 07/13/17 2045  cefTRIAXone (ROCEPHIN) 2 g in dextrose 5 % 50 mL IVPB      -- IV Every 24 hours (non-standard times) 07/13/17 1937          Review of Systems:  Constitutional: no fever or chills  Eyes: no visual changes  ENT: no nasal congestion or sore throat  Respiratory: no cough or shortness of breath  Cardiovascular: no chest pain or palpitations  Gastrointestinal: positive for abdominal pain  Genitourinary: no hematuria or dysuria  Hematologic/Lymphatic: no easy bruising or lymphadenopathy  Musculoskeletal: no arthralgias or myalgias  Neurological: no seizures or tremors    OBJECTIVE:     Vital Signs (Most Recent)  Temp: 97.6 °F (36.4 °C) (07/16/17 0745)  Pulse: 90 (07/16/17 0745)  Resp: 20 (07/16/17 0745)  BP: (!) 165/89 (07/16/17 0745)  SpO2: 96 % (07/16/17 0745)    Temperature Range Min/Max (Last 24H):  Temp:  [97.6 °F (36.4 °C)-98.7 °F (37.1 °C)]     Physical Exam:  General: well developed, well nourished  HENT: Head:normocephalic, atraumatic. Ears:not examined. Nose: Nares normal. Septum midline. Mucosa normal. No drainage or sinus tenderness., no discharge. Throat: lips, mucosa, and tongue normal; teeth and gums normal and no throat erythema.  Eyes: conjunctivae/corneas clear. PERRL.   Neck: supple, symmetrical, trachea midline, no JVD and thyroid not enlarged, symmetric, no tenderness/mass/nodules  Lungs:  clear to auscultation bilaterally and normal respiratory effort  Cardiovascular: Heart: loud psm lse 4/6. Chest Wall: no tenderness.  Extremities: no cyanosis or edema, or clubbing. Pulses: 2+ and symmetric.  Abdomen/Rectal: Abdomen: soft, non-tender non-distented; bowel sounds normal; no masses,  no organomegaly. Rectal: not examined  Skin: Skin color, texture, turgor normal. No rashes or lesions  Musculoskeletal:no clubbing, cyanosis  Lymph Nodes: No cervical or supraclavicular adenopathy    Laboratory:  CBC    Recent Labs  Lab 07/16/17  0403   WBC 13.31*   RBC 3.94*   HGB 10.7*   HCT 33.2*        BMP    Recent Labs  Lab 07/16/17  0403   CO2 23   BUN 16   CREATININE 1.2   CALCIUM 9.1       Recent Labs  Lab 07/11/17  1816   COLORU Yellow   CLARITYU Clear   SPECGRAV 1.025   NITRITE Negative*   LEUKOCYTESUR Negative*   UROBILINOGEN 0.2     Microbiology Results (last 7 days)     ** No results found for the last 168 hours. **          Diagnostic Results:  Labs: Reviewed  X-Ray: Reviewed  Echo: Reviewed    ASSESSMENT/PLAN:     Active Hospital Problems    Diagnosis  POA    *Endocarditis of mitral valve [I05.8]  Yes    Bacteremia due to Staphylococcus aureus [R78.81]  Yes    Diverticulitis of large intestine without bleeding [K57.32]  Yes    Depression [F32.9]  Yes    Chronic kidney disease, stage III (moderate) [N18.3]  Yes    Hypothyroidism [E03.9]  Yes    History of colon cancer [Z85.038]  Yes    Hyperlipidemia [E78.5]  Yes    S/P CABG (coronary artery bypass graft) [Z95.1]  Not Applicable    S/P AVR [Z95.2]  Not Applicable      Resolved Hospital Problems    Diagnosis Date Resolved POA   No resolved problems to display.       1. Staph aureus(mrsa) and strep salivarius in blood. He has a prosthetic aortic valve and has had ? Partially sterilised vegetation on mitral valve since 2015    He is apparently not a surgical candidate  He has not had staph aureus in blood before  Suggest   Sai  Repeat ct abdomen and pelvis to ensure no abscess etc- looks better  Iv rocephin and vancomycin pending sensitivities  Will likely need 6 weeks of iv  abx again, no guarantee of success   I cannot rule out contaminated cultures and have to rx as with prosthetic aortic valve and mitral vegetation  Sai in am

## 2017-07-16 NOTE — PROGRESS NOTES
"Rolo Blanca is a 77 y.o. male patient.    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     Temp: 97.6 °F (36.4 °C) (07/16/17 0745)  Pulse: 90 (07/16/17 0745)  Resp: 20 (07/16/17 0745)  BP: (!) 165/89 (07/16/17 0745)  SpO2: 96 % (07/16/17 0745)  Weight: 97.4 kg (214 lb 11.2 oz) (07/16/17 0500)  Height: 5' 8.5" (174 cm) (07/13/17 1859)    Subjective:  Symptoms:  Stable.    Diet:  Poor intake.      Objective:  Vital signs: (most recent): Blood pressure (!) 165/89, pulse 90, temperature 97.6 °F (36.4 °C), temperature source Oral, resp. rate 20, height 5' 8.5" (1.74 m), weight 97.4 kg (214 lb 11.2 oz), SpO2 96 %.    Lungs:  Normal effort and normal respiratory rate.    Heart: Normal rate.  Regular rhythm.      Assessment & Plan     Echo 7/14/17    1 - Normal left ventricular systolic function (EF 55-60%).     2 - Aortic valve bioprosthesis effective prosthetic valve area corrected for BSA is 1.34 cm2.     3 - Pulmonary hypertension. The estimated PA systolic pressure is 49 mmHg.     4 - Evidence of mitral vegetation - similar in appearance to echo done 1/25/16.     5 - Moderate mitral regurgitation.     6 - Mild tricuspid regurgitation.      Endocarditis - partially treated now with recurrent positive blood cultures. MV vegetation stable in size with moderate MR, no AVR vegetation seen. Will plan for repeat KHALIDA Monday. Abx per ID. Not deemed to be a " surgical candidate at [facility]  AVR - bioprosthetic 2014 - adequate function on echo  CAD/CABG 2014  HTN  HLD  Hx colon CA  Hx TIA    Gurinder Pedersen MD  7/16/2017

## 2017-07-16 NOTE — PROGRESS NOTES
[facility] Hospital Medicine  Progress Note    Patient Name: Rolo Blanca  MRN: [id number]  Patient Class: IP- Inpatient   Admission Date: 7/13/2017  Length of Stay: 3 days  Attending Physician: Marguerite Arana MD  Primary Care Provider: Scott Valadez MD        Subjective:     Principal Problem:Endocarditis of mitral valve    HPI:  The patient is a 78 y/o male with a significant history of colon cancer 2000 sp partial colon resection, hypertension, hyperlipidemia, TIAs, CAD s/p MI/CABG 2014, AVR- bioprosthetic valve and AVR and MV endocarditis 2015 now on suppressive antibiotics (currently followed by ID -[facility] Adarsh Howell) who was told by [facility] to return to ED for 7/11positive blood cultures 2/4 (one bottle GPC chain strep, one bottle GPC cluster staph). Patient then transferred to [facility] for further evaluation. Patient initially went to [place] ED on 7/11 with complaints of abdominal pain, nausea, and vomiting which started 3 days ago then found to have on CT abdomen  sigmoid diverticulitis then treated with cipro and metronidazole. Patient reported today abdominal pain and vomiting resolved and had a normal formed bowel movement. Last episode of nausea and vomiting was last night. Also c/o intermittent dizziness upon position changes but denies chest pain or  shortness of breath. He denies fever, chills, URI symptoms and headaches.     Family concern about suppressive antibiotic Keflex for two years after endocarditis episode. Patient missed ID appointment as plan was for repeat 2 D echo in Jan 2017 to evaluate for resolved vegetation on MV).       Hospital Course:  The patient is a 78 y/o male with a significant history of colon cancer 2000 sp partial colon resection, hypertension, hyperlipidemia, TIAs, CAD s/p MI/CABG 2014, AVR- bioprosthetic valve and AVR and MV endocarditis 2015 now on suppressive antibiotics (currently followed by ID -[facility] Adarsh Howell) who was told by [facility]  to return to ED for 7/11positive blood cultures 2/4 (one bottle GPC chain strep, one bottle GPC cluster staph). Patient then transferred to [facility] for further evaluation!!!. Patient initially went to [facility] ED on 7/11 with complaints of abdominal pain, nausea, and vomiting which started 3 days ago then found to have on CT abdomen  sigmoid diverticulitis then treated with cipro and metronidazole. Patient had some  abdominal pain and vomiting resolved and had a normal formed bowel movement. Last episode of nausea and vomiting was few days ago,he denies any nausea,vomitng at this time,abdopmional pain is resolved,repeat blood culture show no growth,he in on  broad spectrum IV Abx.echo show persistent mitral vegetation,same seize as before,AV is stable,per ID patient is on Vanc and rocephin,will have KHALIDA on Monday by cardiology.  Family concern about suppressive antibiotic Keflex for two years after endocarditis episode.he was non compliant with Keflex. Patient missed ID appointment as plan was for repeat 2 D echo in Jan 2017 to evaluate for resolved vegetation on MV.      Past Medical History:   Diagnosis Date    MI, old 2014       Past Surgical History:   Procedure Laterality Date    AORTIC VALVE REPLACEMENT  2014    CARDIAC SURGERY      CARDIAC VALVE SURGERY      Bio AVR 2014    COLON SURGERY  2000    CORONARY ARTERY BYPASS GRAFT  2014    x2    TONSILLECTOMY         Review of patient's allergies indicates:  No Known Allergies    No current facility-administered medications on file prior to encounter.      Current Outpatient Prescriptions on File Prior to Encounter   Medication Sig    aspirin (ECOTRIN) 81 MG EC tablet Take 1 tablet (81 mg total) by mouth once daily.    atorvastatin (LIPITOR) 10 MG tablet TAKE ONE TABLET BY MOUTH EVERY DAY    buPROPion (WELLBUTRIN XL) 300 MG 24 hr tablet Take 1 tablet (300 mg total) by mouth once daily.    cephALEXin (KEFLEX) 500 MG capsule TAKE ONE CAPSULE BY MOUTH EVERY  TWELVE HOURS    ciprofloxacin HCl (CIPRO) 500 MG tablet Take 1 tablet (500 mg total) by mouth 2 (two) times daily.    diazepam (VALIUM) 2 MG tablet     hydrocodone-acetaminophen 5-325mg (NORCO) 5-325 mg per tablet Take 1 tablet by mouth every 8 (eight) hours as needed for Pain (As needed for severe 10 out of 10 pain).    levothyroxine (SYNTHROID) 75 MCG tablet TAKE ONE TABLET BY MOUTH EVERY DAY BEFORE BREAKFAST    lisinopril (PRINIVIL,ZESTRIL) 20 MG tablet Take 2 tablets by mouth daily (Patient taking differently: 20 mg once daily. Take 2 tablets by mouth daily)    metoprolol succinate (TOPROL-XL) 25 MG 24 hr tablet Take 1 tablet (25 mg total) by mouth once daily.    metronidazole (FLAGYL) 500 MG tablet Take 1 tablet (500 mg total) by mouth every 6 (six) hours.    nystatin-triamcinolone (MYCOLOG II) cream     promethazine (PHENERGAN) 25 MG suppository Place 1 suppository (25 mg total) rectally every 6 (six) hours as needed (Use if vomiting is not controlled with oral medication).    sertraline (ZOLOFT) 50 MG tablet Take 1 tablet (50 mg total) by mouth once daily.    triamcinolone acetonide 0.1% (KENALOG) 0.1 % cream      Family History     None        Social History Main Topics    Smoking status: Never Smoker    Smokeless tobacco: Never Used    Alcohol use Yes      Comment: 2-3 X'S A WEEK    Drug use: No    Sexual activity: Not on file     Review of Systems   Constitutional: Negative for appetite change and chills.   HENT: Negative for congestion, ear discharge and rhinorrhea.    Eyes: Negative for pain, redness and itching.   Respiratory: Negative for cough, chest tightness, shortness of breath, wheezing and stridor.    Cardiovascular: Negative for chest pain and leg swelling.   Gastrointestinal: Negative for abdominal pain, constipation, diarrhea, nausea and vomiting.   Endocrine: Negative for cold intolerance and polydipsia.   Genitourinary: Negative for dysuria and hematuria.   Musculoskeletal:  Negative for arthralgias, joint swelling and myalgias.   Skin: Negative for color change and pallor.   Neurological: Negative for syncope, light-headedness and headaches.   Psychiatric/Behavioral: Negative for confusion and hallucinations. The patient is not nervous/anxious.      Objective:     Vital Signs (Most Recent):  Temp: 98.7 °F (37.1 °C) (07/16/17 0346)  Pulse: 86 (07/16/17 0346)  Resp: 20 (07/16/17 0346)  BP: (!) 156/75 (07/16/17 0346)  SpO2: 95 % (07/16/17 0346) Vital Signs (24h Range):  Temp:  [97.8 °F (36.6 °C)-98.7 °F (37.1 °C)] 98.7 °F (37.1 °C)  Pulse:  [] 86  Resp:  [18-20] 20  SpO2:  [95 %-96 %] 95 %  BP: (156-176)/(75-87) 156/75     Weight: 97.4 kg (214 lb 11.2 oz)  Body mass index is 32.17 kg/m².    Physical Exam   Constitutional: He is oriented to person, place, and time. He appears well-developed and well-nourished. No distress.   HENT:   Head: Normocephalic and atraumatic.   Right Ear: External ear normal.   Left Ear: External ear normal.   Eyes: Conjunctivae and EOM are normal. Pupils are equal, round, and reactive to light.   Neck: Normal range of motion. Neck supple.   Cardiovascular: Normal rate and regular rhythm.    Murmur heard.   Systolic murmur is present   Pulmonary/Chest: Effort normal and breath sounds normal. He has no wheezes. He has no rales.   Abdominal: Soft. Bowel sounds are normal. There is tenderness in the left lower quadrant. There is no guarding.   Musculoskeletal: Normal range of motion.   Neurological: He is alert and oriented to person, place, and time. No cranial nerve deficit.   Skin: Skin is warm. He is not diaphoretic.   Psychiatric: He has a normal mood and affect. His behavior is normal. Judgment and thought content normal.   Vitals reviewed.       Significant Labs: Blood Culture:   No results for input(s): LABBLOO in the last 48 hours.  BMP:     Recent Labs  Lab 07/16/17  0403   *   *   K 4.3      CO2 23   BUN 16   CREATININE 1.2    CALCIUM 9.1     CBC:     Recent Labs  Lab 07/15/17  0454 07/16/17  0403   WBC 14.86* 13.31*   HGB 10.4* 10.7*   HCT 33.0* 33.2*    289     Cardiac Markers: No results for input(s): CKMB, MYOGLOBIN, BNP, TROPISTAT in the last 48 hours.  Lactic Acid: No results for input(s): LACTATE in the last 48 hours.  Lipid Panel: No results for input(s): CHOL, HDL, LDLCALC, TRIG, CHOLHDL in the last 48 hours.  Magnesium:   No results for input(s): MG in the last 48 hours.    Significant Imaging: CXR showed mild cardiomegaly, suggestive of CHF.    Assessment/Plan:      Bacteremia due to Staphylococcus aureus    On 7.11.17 ,Source infected mitral valves,endocarditis,,on Vanc.repeat blood culture on 7.13.17 no growth sofar.          Diverticulitis of large intestine without bleeding    Appears to be resolving as no further abdominal pain or NVD. Had formed BM .on IV abx.Need repeat colonoscopy as out patient after infectious process resolve.        Depression    Pt not compliant with Zoloft and Wellbutrin. Restart Zoloft and wellbutrin.           History of colon cancer    Pt had colonoscopy 2 years ago and reported unremarkable. Encourage follow up with GI.          Hypothyroidism    Continue Synthroid.  Lab Results   Component Value Date    TSH 1.730 02/06/2017             Chronic kidney disease, stage III (moderate)    Cr=1.8, BUN 23.GFR 44. Baseline Cr ~1.5. Continue IV fluids. Avoid nephrotoxic medications.        Hyperlipidemia    Pt not compliant with current statin regiment. Continue and encourage daily statin intake.   Lab Results   Component Value Date    LDLCALC 65.2 06/17/2017             S/P AVR    See above, endocarditis.        S/P CABG (coronary artery bypass graft)    No acute issues. Continue ASA and statin.          * Endocarditis of mitral valve      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 Per ID documentation 12/2015 [facility] KHALIDA showed  vegetation on aortic and mitral valve.     Per their discharge summary, cultures had cleared at time of discharge on 11/27.  1/25/16 TTE shows mobile mass on mitral valve and resolved vegetation on aortic valve. Patient was treated with IV rocephin x 6 weeks then placed on suppressive antibiotic Keflex and plan was for repeat 2 D echo in Jan 2017 however patient have missed appointment with ID.he was also non compliant with Keflex.CT  had no plan for intervention, Obtained 2 D echo,show same seize MV regurgitation, inflammatory markers elevated, Consulted ID,on Rocephin and vanc.cardiology planing for KHALIDA on Monday.patient has been informed may no guarranty for resolution of infection.          VTE Risk Mitigation         Ordered     heparin (porcine) injection 5,000 Units  Every 8 hours     Route:  Subcutaneous        07/14/17 0640     Place WALDO hose  Until discontinued      07/13/17 1843     Place sequential compression device  Until discontinued      07/13/17 1843          Marguerite Arana MD  Department of Hospital Medicine   [facility]

## 2017-07-16 NOTE — PLAN OF CARE
Problem: Sepsis/Septic Shock (Adult)  Goal: Signs and Symptoms of Listed Potential Problems Will be Absent, Minimized or Managed (Sepsis/Septic Shock)  Signs and symptoms of listed potential problems will be absent, minimized or managed by discharge/transition of care (reference Sepsis/Septic Shock (Adult) CPG).   Outcome: Ongoing (interventions implemented as appropriate)  Patient resting comfortably, denies complaints of pain, tolerating cardiac diet well. Patient educated on NPO status after midnight for KHALIDA in am. In with Dr. Davenport, new orders written to dc rocephin. Telemetry monitor intact (box [id number]) confirmed on patient. Vanc trough scheduled on today. Plan of care reviewed with patient, verbalizing understanding. Will continue to monitor   07/16/17 1351   Sepsis/Septic Shock   Problems Assessed (Sepsis) infection progression;situational response   Problems Present (Sepsis) none

## 2017-07-16 NOTE — SUBJECTIVE & OBJECTIVE
Past Medical History:   Diagnosis Date    MI, old 2014       Past Surgical History:   Procedure Laterality Date    AORTIC VALVE REPLACEMENT  2014    CARDIAC SURGERY      CARDIAC VALVE SURGERY      Bio AVR 2014    COLON SURGERY  2000    CORONARY ARTERY BYPASS GRAFT  2014    x2    TONSILLECTOMY         Review of patient's allergies indicates:  No Known Allergies    No current facility-administered medications on file prior to encounter.      Current Outpatient Prescriptions on File Prior to Encounter   Medication Sig    aspirin (ECOTRIN) 81 MG EC tablet Take 1 tablet (81 mg total) by mouth once daily.    atorvastatin (LIPITOR) 10 MG tablet TAKE ONE TABLET BY MOUTH EVERY DAY    buPROPion (WELLBUTRIN XL) 300 MG 24 hr tablet Take 1 tablet (300 mg total) by mouth once daily.    cephALEXin (KEFLEX) 500 MG capsule TAKE ONE CAPSULE BY MOUTH EVERY TWELVE HOURS    ciprofloxacin HCl (CIPRO) 500 MG tablet Take 1 tablet (500 mg total) by mouth 2 (two) times daily.    diazepam (VALIUM) 2 MG tablet     hydrocodone-acetaminophen 5-325mg (NORCO) 5-325 mg per tablet Take 1 tablet by mouth every 8 (eight) hours as needed for Pain (As needed for severe 10 out of 10 pain).    levothyroxine (SYNTHROID) 75 MCG tablet TAKE ONE TABLET BY MOUTH EVERY DAY BEFORE BREAKFAST    lisinopril (PRINIVIL,ZESTRIL) 20 MG tablet Take 2 tablets by mouth daily (Patient taking differently: 20 mg once daily. Take 2 tablets by mouth daily)    metoprolol succinate (TOPROL-XL) 25 MG 24 hr tablet Take 1 tablet (25 mg total) by mouth once daily.    metronidazole (FLAGYL) 500 MG tablet Take 1 tablet (500 mg total) by mouth every 6 (six) hours.    nystatin-triamcinolone (MYCOLOG II) cream     promethazine (PHENERGAN) 25 MG suppository Place 1 suppository (25 mg total) rectally every 6 (six) hours as needed (Use if vomiting is not controlled with oral medication).    sertraline (ZOLOFT) 50 MG tablet Take 1 tablet (50 mg total) by mouth once  daily.    triamcinolone acetonide 0.1% (KENALOG) 0.1 % cream      Family History     None        Social History Main Topics    Smoking status: Never Smoker    Smokeless tobacco: Never Used    Alcohol use Yes      Comment: 2-3 X'S A WEEK    Drug use: No    Sexual activity: Not on file     Review of Systems   Constitutional: Negative for appetite change and chills.   HENT: Negative for congestion, ear discharge and rhinorrhea.    Eyes: Negative for pain, redness and itching.   Respiratory: Negative for cough, chest tightness, shortness of breath, wheezing and stridor.    Cardiovascular: Negative for chest pain and leg swelling.   Gastrointestinal: Negative for abdominal pain, constipation, diarrhea, nausea and vomiting.   Endocrine: Negative for cold intolerance and polydipsia.   Genitourinary: Negative for dysuria and hematuria.   Musculoskeletal: Negative for arthralgias, joint swelling and myalgias.   Skin: Negative for color change and pallor.   Neurological: Negative for syncope, light-headedness and headaches.   Psychiatric/Behavioral: Negative for confusion and hallucinations. The patient is not nervous/anxious.      Objective:     Vital Signs (Most Recent):  Temp: 98.7 °F (37.1 °C) (07/16/17 0346)  Pulse: 86 (07/16/17 0346)  Resp: 20 (07/16/17 0346)  BP: (!) 156/75 (07/16/17 0346)  SpO2: 95 % (07/16/17 0346) Vital Signs (24h Range):  Temp:  [97.8 °F (36.6 °C)-98.7 °F (37.1 °C)] 98.7 °F (37.1 °C)  Pulse:  [] 86  Resp:  [18-20] 20  SpO2:  [95 %-96 %] 95 %  BP: (156-176)/(75-87) 156/75     Weight: 97.4 kg (214 lb 11.2 oz)  Body mass index is 32.17 kg/m².    Physical Exam   Constitutional: He is oriented to person, place, and time. He appears well-developed and well-nourished. No distress.   HENT:   Head: Normocephalic and atraumatic.   Right Ear: External ear normal.   Left Ear: External ear normal.   Eyes: Conjunctivae and EOM are normal. Pupils are equal, round, and reactive to light.   Neck: Normal  range of motion. Neck supple.   Cardiovascular: Normal rate and regular rhythm.    Murmur heard.   Systolic murmur is present   Pulmonary/Chest: Effort normal and breath sounds normal. He has no wheezes. He has no rales.   Abdominal: Soft. Bowel sounds are normal. There is tenderness in the left lower quadrant. There is no guarding.   Musculoskeletal: Normal range of motion.   Neurological: He is alert and oriented to person, place, and time. No cranial nerve deficit.   Skin: Skin is warm. He is not diaphoretic.   Psychiatric: He has a normal mood and affect. His behavior is normal. Judgment and thought content normal.   Vitals reviewed.       Significant Labs: Blood Culture:   No results for input(s): LABBLOO in the last 48 hours.  BMP:     Recent Labs  Lab 07/16/17  0403   *   *   K 4.3      CO2 23   BUN 16   CREATININE 1.2   CALCIUM 9.1     CBC:     Recent Labs  Lab 07/15/17  0454 07/16/17  0403   WBC 14.86* 13.31*   HGB 10.4* 10.7*   HCT 33.0* 33.2*    289     Cardiac Markers: No results for input(s): CKMB, MYOGLOBIN, BNP, TROPISTAT in the last 48 hours.  Lactic Acid: No results for input(s): LACTATE in the last 48 hours.  Lipid Panel: No results for input(s): CHOL, HDL, LDLCALC, TRIG, CHOLHDL in the last 48 hours.  Magnesium:   No results for input(s): MG in the last 48 hours.    Significant Imaging: CXR showed mild cardiomegaly, suggestive of CHF.

## 2017-07-16 NOTE — ASSESSMENT & PLAN NOTE
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 Per ID documentation 12/2015 [facility] KHALIDA showed vegetation on aortic and mitral valve.     Per their discharge summary, cultures had cleared at time of discharge on 11/27.  1/25/16 TTE shows mobile mass on mitral valve and resolved vegetation on aortic valve. Patient was treated with IV rocephin x 6 weeks then placed on suppressive antibiotic Keflex and plan was for repeat 2 D echo in Jan 2017 however patient have missed appointment with ID.he was also non compliant with Keflex.CT  had no plan for intervention, Obtained 2 D echo,show same seize MV regurgitation, inflammatory markers elevated, Consulted ID,on Rocephin and vanc.cardiology planing for KHALIDA on Monday.patient has been informed may no guarranty for resolution of infection.

## 2017-07-16 NOTE — PLAN OF CARE
Problem: Patient Care Overview  Goal: Plan of Care Review  Outcome: Ongoing (interventions implemented as appropriate)   07/16/17 0357   Coping/Psychosocial   Plan Of Care Reviewed With patient   Resting quietly at intervals, no report of pain or discomfort. Ambulatory to bathroom with out assist, tele monitor as ordered. Patient reports feeling weak and dizzy.    Problem: Fall Risk (Adult)  Goal: Absence of Falls  Patient will demonstrate the desired outcomes by discharge/transition of care.   Outcome: Ongoing (interventions implemented as appropriate)   07/16/17 0357   Fall Risk (Adult)   Absence of Falls making progress toward outcome   Located close to nursing station with bed alarm on. Instructed  patient to call for assistance when needed.    Problem: Infection, Risk/Actual (Adult)  Goal: Infection Prevention/Resolution  Patient will demonstrate the desired outcomes by discharge/transition of care.   Outcome: Ongoing (interventions implemented as appropriate)   07/16/17 0357   Infection, Risk/Actual (Adult)   Infection Prevention/Resolution making progress toward outcome   Receiving iv abx as ordered.    Problem: Sepsis/Septic Shock (Adult)  Goal: Signs and Symptoms of Listed Potential Problems Will be Absent, Minimized or Managed (Sepsis/Septic Shock)  Signs and symptoms of listed potential problems will be absent, minimized or managed by discharge/transition of care (reference Sepsis/Septic Shock (Adult) CPG).  Outcome: Ongoing (interventions implemented as appropriate)   07/16/17 0357   Sepsis/Septic Shock   Problems Assessed (Sepsis) infection progression   Patient has been afebrile, drinking plenty of po fluids.

## 2017-07-16 NOTE — ASSESSMENT & PLAN NOTE
On 7.11.17 ,Source infected mitral valves,endocarditis,,on Vanc.repeat blood culture on 7.13.17 no growth sofar.

## 2017-07-17 PROBLEM — R78.81 POSITIVE BLOOD CULTURES: Status: ACTIVE | Noted: 2017-07-17

## 2017-07-17 LAB
ANION GAP SERPL CALC-SCNC: 6 MMOL/L
BACTERIA BLD CULT: NORMAL
BASOPHILS # BLD AUTO: 0.05 K/UL
BASOPHILS NFR BLD: 0.4 %
BUN SERPL-MCNC: 17 MG/DL
CALCIUM SERPL-MCNC: 9.1 MG/DL
CHLORIDE SERPL-SCNC: 101 MMOL/L
CO2 SERPL-SCNC: 24 MMOL/L
CREAT SERPL-MCNC: 1.1 MG/DL
DIFFERENTIAL METHOD: ABNORMAL
EOSINOPHIL # BLD AUTO: 0.8 K/UL
EOSINOPHIL NFR BLD: 6.5 %
ERYTHROCYTE [DISTWIDTH] IN BLOOD BY AUTOMATED COUNT: 15.4 %
EST. GFR  (AFRICAN AMERICAN): >60 ML/MIN/1.73 M^2
EST. GFR  (NON AFRICAN AMERICAN): >60 ML/MIN/1.73 M^2
GLUCOSE SERPL-MCNC: 126 MG/DL
HCT VFR BLD AUTO: 33.2 %
HGB BLD-MCNC: 10.7 G/DL
LYMPHOCYTES # BLD AUTO: 2 K/UL
LYMPHOCYTES NFR BLD: 15.9 %
MCH RBC QN AUTO: 26.6 PG
MCHC RBC AUTO-ENTMCNC: 32.2 %
MCV RBC AUTO: 83 FL
MONOCYTES # BLD AUTO: 2 K/UL
MONOCYTES NFR BLD: 15.8 %
NEUTROPHILS # BLD AUTO: 7.7 K/UL
NEUTROPHILS NFR BLD: 61.4 %
PLATELET # BLD AUTO: 309 K/UL
PMV BLD AUTO: 10.3 FL
POTASSIUM SERPL-SCNC: 4.2 MMOL/L
RBC # BLD AUTO: 4.02 M/UL
SODIUM SERPL-SCNC: 131 MMOL/L
WBC # BLD AUTO: 12.5 K/UL

## 2017-07-17 PROCEDURE — 85025 COMPLETE CBC W/AUTO DIFF WBC: CPT

## 2017-07-17 PROCEDURE — G8989 SELF CARE D/C STATUS: HCPCS | Mod: CI

## 2017-07-17 PROCEDURE — 36415 COLL VENOUS BLD VENIPUNCTURE: CPT

## 2017-07-17 PROCEDURE — G8978 MOBILITY CURRENT STATUS: HCPCS | Mod: CJ

## 2017-07-17 PROCEDURE — 25000003 PHARM REV CODE 250: Performed by: NURSE PRACTITIONER

## 2017-07-17 PROCEDURE — 99232 SBSQ HOSP IP/OBS MODERATE 35: CPT | Mod: ,,, | Performed by: INTERNAL MEDICINE

## 2017-07-17 PROCEDURE — 63600175 PHARM REV CODE 636 W HCPCS: Performed by: HOSPITALIST

## 2017-07-17 PROCEDURE — 97165 OT EVAL LOW COMPLEX 30 MIN: CPT

## 2017-07-17 PROCEDURE — 36569 INSJ PICC 5 YR+ W/O IMAGING: CPT

## 2017-07-17 PROCEDURE — 02HV33Z INSERTION OF INFUSION DEVICE INTO SUPERIOR VENA CAVA, PERCUTANEOUS APPROACH: ICD-10-PCS | Performed by: RADIOLOGY

## 2017-07-17 PROCEDURE — 80048 BASIC METABOLIC PNL TOTAL CA: CPT

## 2017-07-17 PROCEDURE — 12000002 HC ACUTE/MED SURGE SEMI-PRIVATE ROOM

## 2017-07-17 PROCEDURE — 97161 PT EVAL LOW COMPLEX 20 MIN: CPT

## 2017-07-17 PROCEDURE — G8979 MOBILITY GOAL STATUS: HCPCS | Mod: CI

## 2017-07-17 PROCEDURE — G8988 SELF CARE GOAL STATUS: HCPCS | Mod: CI

## 2017-07-17 PROCEDURE — G8987 SELF CARE CURRENT STATUS: HCPCS | Mod: CI

## 2017-07-17 PROCEDURE — 25000003 PHARM REV CODE 250: Performed by: HOSPITALIST

## 2017-07-17 RX ORDER — POLYETHYLENE GLYCOL 3350 17 G/17G
17 POWDER, FOR SOLUTION ORAL 2 TIMES DAILY
Status: DISCONTINUED | OUTPATIENT
Start: 2017-07-17 | End: 2017-07-25 | Stop reason: HOSPADM

## 2017-07-17 RX ORDER — DOCUSATE SODIUM 100 MG/1
100 CAPSULE, LIQUID FILLED ORAL 2 TIMES DAILY PRN
Status: DISCONTINUED | OUTPATIENT
Start: 2017-07-17 | End: 2017-07-25 | Stop reason: HOSPADM

## 2017-07-17 RX ORDER — HYDROXYZINE PAMOATE 25 MG/1
50 CAPSULE ORAL NIGHTLY PRN
Status: DISCONTINUED | OUTPATIENT
Start: 2017-07-17 | End: 2017-07-21

## 2017-07-17 RX ADMIN — AMLODIPINE BESYLATE 10 MG: 5 TABLET ORAL at 09:07

## 2017-07-17 RX ADMIN — ATORVASTATIN CALCIUM 10 MG: 10 TABLET, FILM COATED ORAL at 09:07

## 2017-07-17 RX ADMIN — LISINOPRIL 20 MG: 20 TABLET ORAL at 09:07

## 2017-07-17 RX ADMIN — POLYETHYLENE GLYCOL 3350 17 G: 17 POWDER, FOR SOLUTION ORAL at 09:07

## 2017-07-17 RX ADMIN — HEPARIN SODIUM 5000 UNITS: 5000 INJECTION, SOLUTION INTRAVENOUS; SUBCUTANEOUS at 09:07

## 2017-07-17 RX ADMIN — SERTRALINE HYDROCHLORIDE 50 MG: 50 TABLET ORAL at 09:07

## 2017-07-17 RX ADMIN — METOPROLOL SUCCINATE 25 MG: 25 TABLET, EXTENDED RELEASE ORAL at 09:07

## 2017-07-17 RX ADMIN — VANCOMYCIN HYDROCHLORIDE 1250 MG: 1 INJECTION, POWDER, LYOPHILIZED, FOR SOLUTION INTRAVENOUS at 02:07

## 2017-07-17 RX ADMIN — LEVOTHYROXINE SODIUM 75 MCG: 75 TABLET ORAL at 06:07

## 2017-07-17 RX ADMIN — VANCOMYCIN HYDROCHLORIDE 1250 MG: 1 INJECTION, POWDER, LYOPHILIZED, FOR SOLUTION INTRAVENOUS at 03:07

## 2017-07-17 RX ADMIN — BUPROPION HYDROCHLORIDE 300 MG: 150 TABLET, FILM COATED, EXTENDED RELEASE ORAL at 09:07

## 2017-07-17 RX ADMIN — ASPIRIN 81 MG: 81 TABLET, COATED ORAL at 09:07

## 2017-07-17 RX ADMIN — PANTOPRAZOLE SODIUM 40 MG: 40 TABLET, DELAYED RELEASE ORAL at 09:07

## 2017-07-17 RX ADMIN — HEPARIN SODIUM 5000 UNITS: 5000 INJECTION, SOLUTION INTRAVENOUS; SUBCUTANEOUS at 03:07

## 2017-07-17 NOTE — ASSESSMENT & PLAN NOTE
On 7.11.17 ,Source infected mitral valves,endocarditis,,on Vanc.repeat blood culture on 7.13.17 no growth sofar.placed picc line today.follow vanc. Through level.

## 2017-07-17 NOTE — NURSING
NOAH Paz and NOAH Tavarez verified that the patient is wearing Telebox #[id number] and that it is visible on the monitor. Rate = 90's, Rhythm is sinus.

## 2017-07-17 NOTE — PROGRESS NOTES
"Rolo Blanca is a 77 y.o. male patient.    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     Temp: 98.5 °F (36.9 °C) (07/17/17 0752)  Pulse: 89 (07/17/17 0752)  Resp: 18 (07/17/17 0752)  BP: (!) 165/77 (07/17/17 0752)  SpO2: 96 % (07/17/17 0752)  Weight: 97.4 kg (214 lb 11.2 oz) (07/16/17 0500)  Height: 5' 8.5" (174 cm) (07/13/17 1859)    Subjective:  Symptoms:  Stable.    Diet:  Poor intake.      Objective:  Vital signs: (most recent): Blood pressure (!) 165/77, pulse 89, temperature 98.5 °F (36.9 °C), temperature source Oral, resp. rate 18, height 5' 8.5" (1.74 m), weight 97.4 kg (214 lb 11.2 oz), SpO2 96 %.    Lungs:  Normal effort and normal respiratory rate.    Heart: Normal rate.  Regular rhythm.      Assessment & Plan     Echo 7/14/17    1 - Normal left ventricular systolic function (EF 55-60%).     2 - Aortic valve bioprosthesis effective prosthetic valve area corrected for BSA is 1.34 cm2.     3 - Pulmonary hypertension. The estimated PA systolic pressure is 49 mmHg.     4 - Evidence of mitral vegetation - similar in appearance to echo done 1/25/16.     5 - Moderate mitral regurgitation.     6 - Mild tricuspid regurgitation.      Endocarditis - partially treated now with recurrent positive blood cultures. MV vegetation stable in size with moderate MR, no AVR vegetation seen. KHALIDA recommended - patient currently refuses. Abx per ID. Not deemed to " be a surgical candidate at [facility]  AVR - bioprosthetic 2014 - adequate function on echo  CAD/CABG 2014  HTN  HLD  Hx colon CA  Hx TIA    Refuses KHALIDA - continue Rx    Gurinder Pedersen MD  7/17/2017

## 2017-07-17 NOTE — PROCEDURES
"Rolo Blanca is a 77 y.o. male patient.    Temp: 98.5 °F (36.9 °C) (07/17/17 0752)  Pulse: 89 (07/17/17 0752)  Resp: 18 (07/17/17 0752)  BP: (!) 165/77 (07/17/17 0752)  SpO2: 96 % (07/17/17 0752)  Weight: 97.4 kg (214 lb 11.2 oz) (07/16/17 0500)  Height: 5' 8.5" (174 cm) (07/13/17 3109)    PICC  Date/Time: 7/17/2017 10:20 AM  Consent Done: Yes  Time out: Immediately prior to procedure a time out was called to verify the correct patient, procedure, equipment, support staff and site/side marked as required  Indications: med administration and vascular access  Anesthesia: local infiltration  Local anesthetic: lidocaine 1% without epinephrine  Anesthetic Total (mL): 5  Preparation: skin prepped with ChloraPrep  Skin prep agent dried: skin prep agent completely dried prior to procedure  Sterile barriers: all five maximum sterile barriers used - cap, mask, sterile gown, sterile gloves, and large sterile sheet  Hand hygiene: hand hygiene performed prior to central venous catheter insertion  Location details: left basilic  Catheter type: double lumen  Catheter size: 5 Fr  Catheter Length: 48cm    Ultrasound guidance: yes  Vessel Caliber: medium and patent, compressibility normal  Needle advanced into vessel with real time Ultrasound guidance.  Guidewire confirmed in vessel.  Sterile sheath used.  Manometry: esophageal manometry  Number of attempts: 1  Post-procedure: blood return through all ports, chlorhexidine patch and sterile dressing applied  Estimated blood loss (mL): 2          Jaxon Sanchez  7/17/2017  "

## 2017-07-17 NOTE — PT/OT/SLP EVAL
Physical Therapy  Evaluation    Rolo Blanca   MRN: [id number]   Admitting Diagnosis: Endocarditis of mitral valve    PT Received On: 07/17/17  PT Start Time: 1102     PT Stop Time: 1118    PT Total Time (min): 16 min       Billable Minutes:  Evaluation  16     Diagnosis: Endocarditis of mitral valve    Past Medical History:   Diagnosis Date    MI, old 2014      Past Surgical History:   Procedure Laterality Date    AORTIC VALVE REPLACEMENT  2014    CARDIAC SURGERY      CARDIAC VALVE SURGERY      Bio AVR 2014    COLON SURGERY  2000    CORONARY ARTERY BYPASS GRAFT  2014    x2    TONSILLECTOMY       Referring physician: Sumit  Date referred to PT: 7/17/17    General Precautions: Standard, fall, contact (MRSA )  Orthopedic Precautions: N/A   Braces: N/A       Patient History:  Lives With: spouse  Living Arrangements: house  Home Accessibility: stairs to enter home  Number of Stairs to Enter Home: 4  Stair Railings at Home: outside, present at both sides  Equipment Currently Used at Home: none    Previous Level of Function:  Ambulation Skills: independent  Transfer Skills: independent    Subjective:  Patient agreeable to participate in PT evaluation.   Chief Complaint: Feels funny in his head.   Patient goals: To go home.     Pain/Comfort  Pain Rating 1: 0/10    Objective:   Patient found with: PICC line, peripheral IV, telemetry     Cognitive Exam:  Oriented to: Person, Place, Time and Situation    Follows Commands/attention: Follows one-step commands  Communication: clear/fluent  Safety awareness/insight to disability: impaired    Physical Exam:  Postural examination/scapula alignment: Rounded shoulder and Head forward    Skin integrity: Visible skin intact  Edema: None noted     Sensation: Intact    Lower Extremity Range of Motion:  Right Lower Extremity: WFL  Left Lower Extremity: WFL    Lower Extremity Strength:  Right Lower Extremity: WFL  Left Lower Extremity: WFL     Fine motor  coordination:Intact    Gross motor coordination: WFL    Functional Mobility:  Bed Mobility:  Supine to Sit: Supervision    Transfers:  Sit <> Stand Assistance: Supervision  Sit <> Stand Assistive Device: No Assistive Device    Gait:   Gait Distance: ~160ft with minimal right to left swaying but no loss of balance  Assistance 1: Contact Guard Assistance  Gait Assistive Device: Hand held assist  Gait Pattern: reciprocal    Balance:   Static Sit: GOOD+: Takes MAXIMAL challenges from all directions.    Dynamic Sit: GOOD: Maintains balance through MODERATE excursions of active trunk movement  Static Stand: FAIR+: Takes MINIMAL challenges from all directions  Dynamic stand: FAIR: Needs CONTACT GUARD during gait    AM-PAC 6 CLICK MOBILITY  How much help from another person does this patient currently need?   1 = Unable, Total/Dependent Assistance  2 = A lot, Maximum/Moderate Assistance  3 = A little, Minimum/Contact Guard/Supervision  4 = None, Modified [place]/Independent    Turning over in bed (including adjusting bedclothes, sheets and blankets)?: 4  Sitting down on and standing up from a chair with arms (e.g., wheelchair, bedside commode, etc.): 4  Moving from lying on back to sitting on the side of the bed?: 4  Moving to and from a bed to a chair (including a wheelchair)?: 3  Need to walk in hospital room?: 3  Climbing 3-5 steps with a railing?: 3  Total Score: 21     AM-PAC Raw Score CMS G-Code Modifier Level of Impairment Assistance   6 % Total / Unable   7 - 9 CM 80 - 100% Maximal Assist   10 - 14 CL 60 - 80% Moderate Assist   15 - 19 CK 40 - 60% Moderate Assist   20 - 22 CJ 20 - 40% Minimal Assist   23 CI 1-20% SBA / CGA   24 CH 0% Independent/ Mod I     Patient left up in chair with all lines intact, call button in reach, nurse [place] notified, and spouse present.    Assessment:   Rolo Blanca is a 77 y.o. male with a medical diagnosis of Endocarditis of mitral valve and presents with impaired  balance and endurance due to prolonged hospital stay. He is okay to ambulate with nursing staff assistance. He would continue to benefit from PT while in the hospital in order to address deficits listed below and get patient back to PLOF.     Rehab identified problem list/impairments: Rehab identified problem list/impairments: weakness, impaired endurance, impaired balance, gait instability, decreased safety awareness    Rehab potential is good.    Activity tolerance: Fair    Discharge recommendations: Discharge Facility/Level Of Care Needs: home (with assistance from spouse as needed)     Barriers to discharge: Barriers to Discharge: None    Equipment recommendations: Equipment Needed After Discharge: none     GOALS:    Physical Therapy Goals        Problem: Physical Therapy Goal    Goal Priority Disciplines Outcome Goal Variances Interventions   Physical Therapy Goal     PT/OT, PT      Description:  Goals to be met by: 17    Patient will increase functional independence with mobility by performin. Sit to stand transfer with Modified [place]  2. Gait  x 250 feet with Modified [place]   3. Ascend/descend 4 stair with bilateral Handrails Modified [place]   4. Lower extremity exercise program x30 reps per handout, with independence                    PLAN:    Patient to be seen 5 x/week to address the above listed problems via gait training, therapeutic activities, therapeutic exercises  Plan of Care expires: 17  Plan of Care reviewed with: patient    Functional Assessment Tool Used: AM PAC   Score: 21  Functional Limitation: Mobility: Walking and moving around  Mobility: Walking and Moving Around Current Status (): RAYMON  Mobility: Walking and Moving Around Goal Status (): KRISTA [place], PT, MOT  2017

## 2017-07-17 NOTE — PT/OT/SLP EVAL
"Occupational Therapy  Evaluation/Discharge    Rolo Blanca   MRN: [id number]   Admitting Diagnosis: Endocarditis of mitral valve    OT Date of Treatment: 07/17/17   OT Start Time: 1102  OT Stop Time: 1118  OT Total Time (min): 16 min    Billable Minutes:  Evaluation 16 minutes (co-eval with PT)    Diagnosis: Endocarditis of mitral valve       Past Medical History:   Diagnosis Date    MI, old 2014      Past Surgical History:   Procedure Laterality Date    AORTIC VALVE REPLACEMENT  2014    CARDIAC SURGERY      CARDIAC VALVE SURGERY      Bio AVR 2014    COLON SURGERY  2000    CORONARY ARTERY BYPASS GRAFT  2014    x2    TONSILLECTOMY         Referring physician: Dr. Arana  Date referred to OT: 7/16/2017    General Precautions: Standard, fall  Orthopedic Precautions: N/A  Braces: N/A    Do you have any cultural, spiritual, Zoroastrian conflicts, given your current situation?: none     Patient History:  Living Environment  Lives With: spouse  Living Arrangements: house  Home Accessibility: stairs to enter home  Home Layout: Able to live on 1st floor  Number of Stairs to Enter Home: 4  Stair Railings at Home: outside, present at both sides  Transportation Available: ambulance  Equipment Currently Used at Home: none    Prior level of function:   Bed Mobility/Transfers: independent  Grooming: independent  Bathing: independent  Upper Body Dressing: independent  Lower Body Dressing: independent  Toileting: independent        Dominant hand: right    Subjective:  Communicated with nurse prior to session.    Chief Complaint: "My head feels funny"  Patient/Family stated goals: he wants to return home    Pain/Comfort  Pain Rating 1: 0/10    Objective:  Patient found with: PICC line, peripheral IV    Cognitive Exam:  Oriented to: Person, Place and Situation  Follows Commands/attention: Follows two-step commands  Communication: clear/fluent  Memory:  No Deficits noted  Safety awareness/insight to disability: " intact  Coping skills/emotional control: Appropriate to situation    Visual/perceptual:  Intact    Physical Exam:  Postural examination/scapula alignment: Rounded shoulder  Skin integrity: Visible skin intact  Edema: None noted     Sensation:   Intact    Upper Extremity Range of Motion:  Right Upper Extremity: WFL  Left Upper Extremity: WFL    Upper Extremity Strength:  Right Upper Extremity: WFL  Left Upper Extremity: WFL   Strength: WFL    Fine motor coordination:   Intact    Gross motor coordination: WFL    Functional Mobility:  Bed Mobility:  Rolling/Turning Right: Modified independent, With side rail  Supine to Sit: Modified Independent, WIth side rail    Transfers:  Sit <> Stand Assistance: Contact Guard Assistance  Sit <> Stand Assistive Device: No Assistive Device  Bed <> Chair Transfer Assistance: Supervision  Bed <> Chair Assistive Device: No Assistive Device    Functional Ambulation: ambulated in the mcdonald with CGA    Activities of Daily Living:  Feeding Level of Assistance: Independent  UE Dressing Level of Assistance: Independent  LE Dressing Level of Assistance: Set-up Assistance    Balance:   Static Sit: GOOD: Takes MODERATE challenges from all directions  Dynamic Sit: GOOD: Maintains balance through MODERATE excursions of active trunk movement  Static Stand: FAIR+: Takes MINIMAL challenges from all directions  Dynamic stand: FAIR+: Needs CLOSE SUPERVISION during gait and is able to right self with minor LOB      AM-PAC 6 CLICK ADL  How much help from another person does this patient currently need?  1 = Unable, Total/Dependent Assistance  2 = A lot, Maximum/Moderate Assistance  3 = A little, Minimum/Contact Guard/Supervision  4 = None, Modified [place]/Independent    Putting on and taking off regular lower body clothing? : 4  Bathing (including washing, rinsing, drying)?: 3  Toileting, which includes using toilet, bedpan, or urinal? : 4  Putting on and taking off regular upper body clothing?:  "4  Taking care of personal grooming such as brushing teeth?: 4  Eating meals?: 4  Total Score: 23    AM-PAC Raw Score CMS "G-Code Modifier Level of Impairment Assistance   6 % Total / Unable   7 - 9 CM 80 - 100% Maximal Assist   10-14 CL 60 - 80% Moderate Assist   15 - 19 CK 40 - 60% Moderate Assist   20 - 22 CJ 20 - 40% Minimal Assist   23 CI 1-20% SBA / CGA   24 CH 0% Independent/ Mod I       Patient left up in chair with all lines intact, call button in reach, nurse notified and wife present    Assessment:  Rolo Blanca is a 77 y.o. male with a medical diagnosis of Endocarditis of mitral valve and presents with functional status at or near his PLOF..    Rehab identified problem list/impairments: Rehab identified problem list/impairments: impaired endurance, gait instability, decreased safety awareness    Rehab potential is good.    Activity tolerance: Good    Discharge recommendations: Discharge Facility/Level Of Care Needs: home (with assistance from his wife as needed)     Barriers to discharge: Barriers to Discharge: None    Equipment recommendations: none     GOALS:    Occupational Therapy Goals     Not on file          Multidisciplinary Problems (Resolved)        Problem: Occupational Therapy Goal    Goal Priority Disciplines Outcome Interventions   Occupational Therapy Goal   (Resolved)     OT, PT/OT Outcome(s) achieved                    PLAN:  Patient without need for skilled OT services at this time.  Plan of Care reviewed with: patient, spouse    OT G-codes  Functional Assessment Tool Used: [facility]  Score: 23  Functional Limitation: Self care  Self Care Current Status (): CI  Self Care Goal Status (): CI  Self Care Discharge Status (): TARAN Verdugo, MS  07/17/2017  "

## 2017-07-17 NOTE — SUBJECTIVE & OBJECTIVE
Past Medical History:   Diagnosis Date    MI, old 2014       Past Surgical History:   Procedure Laterality Date    AORTIC VALVE REPLACEMENT  2014    CARDIAC SURGERY      CARDIAC VALVE SURGERY      Bio AVR 2014    COLON SURGERY  2000    CORONARY ARTERY BYPASS GRAFT  2014    x2    TONSILLECTOMY         Review of patient's allergies indicates:  No Known Allergies    No current facility-administered medications on file prior to encounter.      Current Outpatient Prescriptions on File Prior to Encounter   Medication Sig    aspirin (ECOTRIN) 81 MG EC tablet Take 1 tablet (81 mg total) by mouth once daily.    atorvastatin (LIPITOR) 10 MG tablet TAKE ONE TABLET BY MOUTH EVERY DAY    buPROPion (WELLBUTRIN XL) 300 MG 24 hr tablet Take 1 tablet (300 mg total) by mouth once daily.    cephALEXin (KEFLEX) 500 MG capsule TAKE ONE CAPSULE BY MOUTH EVERY TWELVE HOURS    ciprofloxacin HCl (CIPRO) 500 MG tablet Take 1 tablet (500 mg total) by mouth 2 (two) times daily.    diazepam (VALIUM) 2 MG tablet     hydrocodone-acetaminophen 5-325mg (NORCO) 5-325 mg per tablet Take 1 tablet by mouth every 8 (eight) hours as needed for Pain (As needed for severe 10 out of 10 pain).    levothyroxine (SYNTHROID) 75 MCG tablet TAKE ONE TABLET BY MOUTH EVERY DAY BEFORE BREAKFAST    lisinopril (PRINIVIL,ZESTRIL) 20 MG tablet Take 2 tablets by mouth daily (Patient taking differently: 20 mg once daily. Take 2 tablets by mouth daily)    metoprolol succinate (TOPROL-XL) 25 MG 24 hr tablet Take 1 tablet (25 mg total) by mouth once daily.    metronidazole (FLAGYL) 500 MG tablet Take 1 tablet (500 mg total) by mouth every 6 (six) hours.    nystatin-triamcinolone (MYCOLOG II) cream     promethazine (PHENERGAN) 25 MG suppository Place 1 suppository (25 mg total) rectally every 6 (six) hours as needed (Use if vomiting is not controlled with oral medication).    sertraline (ZOLOFT) 50 MG tablet Take 1 tablet (50 mg total) by mouth once  daily.    triamcinolone acetonide 0.1% (KENALOG) 0.1 % cream      Family History     None        Social History Main Topics    Smoking status: Never Smoker    Smokeless tobacco: Never Used    Alcohol use Yes      Comment: 2-3 X'S A WEEK    Drug use: No    Sexual activity: Not on file     Review of Systems   Constitutional: Negative for appetite change and chills.   HENT: Negative for congestion, ear discharge and rhinorrhea.    Eyes: Negative for pain, redness and itching.   Respiratory: Negative for cough, chest tightness, shortness of breath, wheezing and stridor.    Cardiovascular: Negative for chest pain and leg swelling.   Gastrointestinal: Negative for abdominal pain, constipation, diarrhea, nausea and vomiting.   Endocrine: Negative for cold intolerance and polydipsia.   Genitourinary: Negative for dysuria and hematuria.   Musculoskeletal: Negative for arthralgias, joint swelling and myalgias.   Skin: Negative for color change and pallor.   Neurological: Negative for syncope, light-headedness and headaches.   Psychiatric/Behavioral: Negative for confusion and hallucinations. The patient is not nervous/anxious.      Objective:     Vital Signs (Most Recent):  Temp: 97.8 °F (36.6 °C) (07/17/17 0400)  Pulse: 84 (07/17/17 0400)  Resp: 18 (07/17/17 0400)  BP: (!) 147/70 (07/17/17 0400)  SpO2: 96 % (07/17/17 0400) Vital Signs (24h Range):  Temp:  [97.5 °F (36.4 °C)-98.9 °F (37.2 °C)] 97.8 °F (36.6 °C)  Pulse:  [81-93] 84  Resp:  [17-20] 18  SpO2:  [94 %-97 %] 96 %  BP: (146-167)/(68-89) 147/70     Weight: 97.4 kg (214 lb 11.2 oz)  Body mass index is 32.17 kg/m².    Physical Exam   Constitutional: He is oriented to person, place, and time. He appears well-developed and well-nourished. No distress.   HENT:   Head: Normocephalic and atraumatic.   Right Ear: External ear normal.   Left Ear: External ear normal.   Eyes: Conjunctivae and EOM are normal. Pupils are equal, round, and reactive to light.   Neck: Normal  range of motion. Neck supple.   Cardiovascular: Normal rate and regular rhythm.    Murmur heard.   Systolic murmur is present   Pulmonary/Chest: Effort normal and breath sounds normal. He has no wheezes. He has no rales.   Abdominal: Soft. Bowel sounds are normal. There is no tenderness. There is no guarding.   Musculoskeletal: Normal range of motion.   Neurological: He is alert and oriented to person, place, and time. No cranial nerve deficit.   Skin: Skin is warm. He is not diaphoretic.   Psychiatric: He has a normal mood and affect. His behavior is normal. Judgment and thought content normal.   Vitals reviewed.       Significant Labs: Blood Culture:   No results for input(s): LABBLOO in the last 48 hours.  BMP:     Recent Labs  Lab 07/17/17  0501   *   *   K 4.2      CO2 24   BUN 17   CREATININE 1.1   CALCIUM 9.1     CBC:     Recent Labs  Lab 07/16/17  0403 07/17/17  0501   WBC 13.31* 12.50   HGB 10.7* 10.7*   HCT 33.2* 33.2*    309     Cardiac Markers: No results for input(s): CKMB, MYOGLOBIN, BNP, TROPISTAT in the last 48 hours.  Lactic Acid: No results for input(s): LACTATE in the last 48 hours.  Lipid Panel: No results for input(s): CHOL, HDL, LDLCALC, TRIG, CHOLHDL in the last 48 hours.  Magnesium:   No results for input(s): MG in the last 48 hours.    Significant Imaging: CXR showed mild cardiomegaly, suggestive of CHF.

## 2017-07-17 NOTE — PLAN OF CARE
Problem: Occupational Therapy Goal  Goal: Occupational Therapy Goal  Outcome: Outcome(s) achieved Date Met: 07/17/17  Patient tolerated evaluation well, patient with no need for skilled OT services at this time. TARAN Goode, MS

## 2017-07-17 NOTE — PROGRESS NOTES
[facility] Hospital Medicine  Progress Note    Patient Name: Rolo Blanca  MRN: [id number]  Patient Class: IP- Inpatient   Admission Date: 7/13/2017  Length of Stay: 4 days  Attending Physician: Marguerite Arana MD  Primary Care Provider: Scott Valadez MD        Subjective:     Principal Problem:Endocarditis of mitral valve    HPI:  The patient is a 76 y/o male with a significant history of colon cancer 2000 sp partial colon resection, hypertension, hyperlipidemia, TIAs, CAD s/p MI/CABG 2014, AVR- bioprosthetic valve and AVR and MV endocarditis 2015 now on suppressive antibiotics (currently followed by ID -[facility] Adarsh Howell) who was told by [facility] to return to ED for 7/11positive blood cultures 2/4 (one bottle GPC chain strep, one bottle GPC cluster staph). Patient then transferred to [facility] for further evaluation. Patient initially went to [place] ED on 7/11 with complaints of abdominal pain, nausea, and vomiting which started 3 days ago then found to have on CT abdomen  sigmoid diverticulitis then treated with cipro and metronidazole. Patient reported today abdominal pain and vomiting resolved and had a normal formed bowel movement. Last episode of nausea and vomiting was last night. Also c/o intermittent dizziness upon position changes but denies chest pain or  shortness of breath. He denies fever, chills, URI symptoms and headaches.     Family concern about suppressive antibiotic Keflex for two years after endocarditis episode. Patient missed ID appointment as plan was for repeat 2 D echo in Jan 2017 to evaluate for resolved vegetation on MV).       Hospital Course:  The patient is a 76 y/o male with a significant history of colon cancer 2000 sp partial colon resection, hypertension, hyperlipidemia, TIAs, CAD s/p MI/CABG 2014, AVR- bioprosthetic valve and AVR and MV endocarditis 2015 now on suppressive antibiotics (currently followed by ID -[facility] Adarsh Howell) who was told by [facility]  to return to ED for 7/11positive blood cultures 2/4 (one bottle GPC chain strep, one bottle GPC cluster staph). Patient then transferred to [facility] for further evaluation!!!. Patient initially went to [place] ED on 7/11 with complaints of abdominal pain, nausea, and vomiting which started 3 days ago then found to have on CT abdomen  sigmoid diverticulitis then treated with cipro and metronidazole. Patient had some  abdominal pain and vomiting resolved and had a normal formed bowel movement. Last episode of nausea and vomiting was few days ago,he denies any nausea,vomitng at this time,abdopmional pain is resolved,has MRSA and strep viridance on blood culture on 7.11.17.repeat blood culture on 7.13.17 show no growth,he in on  broad spectrum IV Abx.echo show persistent mitral vegetation,same size as before,AV is stable,per ID patient is on Vanc and rocephin,schedulled for KHALIDA for today.  Family concern about suppressive antibiotic Keflex for two years after endocarditis episode.he was non compliant with Keflex. Patient missed ID appointment as plan was for repeat 2 D echo in Jan 2017 to evaluate for resolved vegetation on MV.  Vancomycin dosage has been adjusted,will place picc.  Patient refusing doing KHALIDA at this time.,wany talk with cardiology.  Consulted PT,T      Past Medical History:   Diagnosis Date    MI, old 2014       Past Surgical History:   Procedure Laterality Date    AORTIC VALVE REPLACEMENT  2014    CARDIAC SURGERY      CARDIAC VALVE SURGERY      Bio AVR 2014    COLON SURGERY  2000    CORONARY ARTERY BYPASS GRAFT  2014    x2    TONSILLECTOMY         Review of patient's allergies indicates:  No Known Allergies    No current facility-administered medications on file prior to encounter.      Current Outpatient Prescriptions on File Prior to Encounter   Medication Sig    aspirin (ECOTRIN) 81 MG EC tablet Take 1 tablet (81 mg total) by mouth once daily.    atorvastatin (LIPITOR) 10 MG tablet TAKE ONE  TABLET BY MOUTH EVERY DAY    buPROPion (WELLBUTRIN XL) 300 MG 24 hr tablet Take 1 tablet (300 mg total) by mouth once daily.    cephALEXin (KEFLEX) 500 MG capsule TAKE ONE CAPSULE BY MOUTH EVERY TWELVE HOURS    ciprofloxacin HCl (CIPRO) 500 MG tablet Take 1 tablet (500 mg total) by mouth 2 (two) times daily.    diazepam (VALIUM) 2 MG tablet     hydrocodone-acetaminophen 5-325mg (NORCO) 5-325 mg per tablet Take 1 tablet by mouth every 8 (eight) hours as needed for Pain (As needed for severe 10 out of 10 pain).    levothyroxine (SYNTHROID) 75 MCG tablet TAKE ONE TABLET BY MOUTH EVERY DAY BEFORE BREAKFAST    lisinopril (PRINIVIL,ZESTRIL) 20 MG tablet Take 2 tablets by mouth daily (Patient taking differently: 20 mg once daily. Take 2 tablets by mouth daily)    metoprolol succinate (TOPROL-XL) 25 MG 24 hr tablet Take 1 tablet (25 mg total) by mouth once daily.    metronidazole (FLAGYL) 500 MG tablet Take 1 tablet (500 mg total) by mouth every 6 (six) hours.    nystatin-triamcinolone (MYCOLOG II) cream     promethazine (PHENERGAN) 25 MG suppository Place 1 suppository (25 mg total) rectally every 6 (six) hours as needed (Use if vomiting is not controlled with oral medication).    sertraline (ZOLOFT) 50 MG tablet Take 1 tablet (50 mg total) by mouth once daily.    triamcinolone acetonide 0.1% (KENALOG) 0.1 % cream      Family History     None        Social History Main Topics    Smoking status: Never Smoker    Smokeless tobacco: Never Used    Alcohol use Yes      Comment: 2-3 X'S A WEEK    Drug use: No    Sexual activity: Not on file     Review of Systems   Constitutional: Negative for appetite change and chills.   HENT: Negative for congestion, ear discharge and rhinorrhea.    Eyes: Negative for pain, redness and itching.   Respiratory: Negative for cough, chest tightness, shortness of breath, wheezing and stridor.    Cardiovascular: Negative for chest pain and leg swelling.   Gastrointestinal: Negative  for abdominal pain, constipation, diarrhea, nausea and vomiting.   Endocrine: Negative for cold intolerance and polydipsia.   Genitourinary: Negative for dysuria and hematuria.   Musculoskeletal: Negative for arthralgias, joint swelling and myalgias.   Skin: Negative for color change and pallor.   Neurological: Negative for syncope, light-headedness and headaches.   Psychiatric/Behavioral: Negative for confusion and hallucinations. The patient is not nervous/anxious.      Objective:     Vital Signs (Most Recent):  Temp: 97.8 °F (36.6 °C) (07/17/17 0400)  Pulse: 84 (07/17/17 0400)  Resp: 18 (07/17/17 0400)  BP: (!) 147/70 (07/17/17 0400)  SpO2: 96 % (07/17/17 0400) Vital Signs (24h Range):  Temp:  [97.5 °F (36.4 °C)-98.9 °F (37.2 °C)] 97.8 °F (36.6 °C)  Pulse:  [81-93] 84  Resp:  [17-20] 18  SpO2:  [94 %-97 %] 96 %  BP: (146-167)/(68-89) 147/70     Weight: 97.4 kg (214 lb 11.2 oz)  Body mass index is 32.17 kg/m².    Physical Exam   Constitutional: He is oriented to person, place, and time. He appears well-developed and well-nourished. No distress.   HENT:   Head: Normocephalic and atraumatic.   Right Ear: External ear normal.   Left Ear: External ear normal.   Eyes: Conjunctivae and EOM are normal. Pupils are equal, round, and reactive to light.   Neck: Normal range of motion. Neck supple.   Cardiovascular: Normal rate and regular rhythm.    Murmur heard.   Systolic murmur is present   Pulmonary/Chest: Effort normal and breath sounds normal. He has no wheezes. He has no rales.   Abdominal: Soft. Bowel sounds are normal. There is no tenderness. There is no guarding.   Musculoskeletal: Normal range of motion.   Neurological: He is alert and oriented to person, place, and time. No cranial nerve deficit.   Skin: Skin is warm. He is not diaphoretic.   Psychiatric: He has a normal mood and affect. His behavior is normal. Judgment and thought content normal.   Vitals reviewed.       Significant Labs: Blood Culture:   No  results for input(s): LABBLOO in the last 48 hours.  BMP:     Recent Labs  Lab 07/17/17  0501   *   *   K 4.2      CO2 24   BUN 17   CREATININE 1.1   CALCIUM 9.1     CBC:     Recent Labs  Lab 07/16/17  0403 07/17/17  0501   WBC 13.31* 12.50   HGB 10.7* 10.7*   HCT 33.2* 33.2*    309     Cardiac Markers: No results for input(s): CKMB, MYOGLOBIN, BNP, TROPISTAT in the last 48 hours.  Lactic Acid: No results for input(s): LACTATE in the last 48 hours.  Lipid Panel: No results for input(s): CHOL, HDL, LDLCALC, TRIG, CHOLHDL in the last 48 hours.  Magnesium:   No results for input(s): MG in the last 48 hours.    Significant Imaging: CXR showed mild cardiomegaly, suggestive of CHF.    Assessment/Plan:      Bacteremia due to Staphylococcus aureus    On 7.11.17 ,Source infected mitral valves,endocarditis,,on Vanc.repeat blood culture on 7.13.17 no growth sofar.placed picc line today.follow vanc. Through level.          Diverticulitis of large intestine without bleeding    Appears to be resolving as no further abdominal pain or NVD. Had formed BM .on IV abx.Need repeat colonoscopy as out patient after infectious process resolve.        Depression    Pt not compliant with Zoloft and Wellbutrin. Restart Zoloft and wellbutrin.           History of colon cancer    Pt had colonoscopy 2 years ago and reported unremarkable. Encourage follow up with GI.          Hypothyroidism    Continue Synthroid.  Lab Results   Component Value Date    TSH 1.730 02/06/2017             Chronic kidney disease, stage III (moderate)    Cr=1.8, BUN 23.GFR 44. Baseline Cr ~1.5. Continue IV fluids. Avoid nephrotoxic medications.        Hyperlipidemia    Pt not compliant with current statin regiment. Continue and encourage daily statin intake.   Lab Results   Component Value Date    LDLCALC 65.2 06/17/2017             S/P AVR    See above, endocarditis.        S/P CABG (coronary artery bypass graft)    No acute issues. Continue  ASA and statin.          * Endocarditis of mitral valve      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 Per ID documentation 12/2015 [facility] KHALIDA showed vegetation on aortic and mitral valve.     Per their discharge summary, cultures had cleared at time of discharge on 11/27.  1/25/16 TTE shows mobile mass on mitral valve and resolved vegetation on aortic valve. Patient was treated with IV rocephin x 6 weeks then placed on suppressive antibiotic Keflex and plan was for repeat 2 D echo in Jan 2017 however patient have missed appointment with ID.he was also non compliant with Keflex.CT   had no plan for intervention, Obtained 2 D echo,show same seize MV regurgitation, inflammatory markers elevated, Consulted ID,on Rocephin and vanc.cardiology planing for KHALIDA today,but patient refusing at this time..patient has been informed no guarranty for resolution of infection.he is non compliant with medical treatment,will place picc line today.need 6 weeks IV Abx,vanc through is low,dosage by pharmacy has been adjusted,follow through.          VTE Risk Mitigation         Ordered     heparin (porcine) injection 5,000 Units  Every 8 hours     Route:  Subcutaneous        07/14/17 0640     Place WALDO hose  Until discontinued      07/13/17 1843     Place sequential compression device  Until discontinued      07/13/17 1843          Marguerite Arana MD  Department of Hospital Medicine   [facility]

## 2017-07-17 NOTE — PLAN OF CARE
Problem: Physical Therapy Goal  Goal: Physical Therapy Goal  Goals to be met by: 17    Patient will increase functional independence with mobility by performin. Sit to stand transfer with Modified [place]  2. Gait  x 250 feet with Modified [place]   3. Ascend/descend 4 stair with bilateral Handrails Modified [place]   4. Lower extremity exercise program x30 reps per handout, with independence      Patient okay to ambulate with nursing staff assistance.

## 2017-07-17 NOTE — PLAN OF CARE
Problem: Patient Care Overview  Goal: Plan of Care Review  Outcome: Ongoing (interventions implemented as appropriate)   07/17/17 0411   Coping/Psychosocial   Plan Of Care Reviewed With patient   Resting quietly at intervals, no report of pain or discomfort. Npo past mn status maintained, but states will refuse KHALIDA procedure in am.  Ambulatory to bathroom with minimal assist.    Problem: Fall Risk (Adult)  Goal: Absence of Falls  Patient will demonstrate the desired outcomes by discharge/transition of care.   Outcome: Ongoing (interventions implemented as appropriate)   07/17/17 0411   Fall Risk (Adult)   Absence of Falls making progress toward outcome   Located close to nursing station, with bed alarm on. Instructed to call for assistance when needed.    Problem: Infection, Risk/Actual (Adult)  Goal: Infection Prevention/Resolution  Patient will demonstrate the desired outcomes by discharge/transition of care.   Outcome: Ongoing (interventions implemented as appropriate)   07/17/17 0411   Infection, Risk/Actual (Adult)   Infection Prevention/Resolution making progress toward outcome   Continues with iv abx as ordered.    Problem: Sepsis/Septic Shock (Adult)  Goal: Signs and Symptoms of Listed Potential Problems Will be Absent, Minimized or Managed (Sepsis/Septic Shock)  Signs and symptoms of listed potential problems will be absent, minimized or managed by discharge/transition of care (reference Sepsis/Septic Shock (Adult) CPG).   Outcome: Ongoing (interventions implemented as appropriate)   07/17/17 0411   Sepsis/Septic Shock   Problems Assessed (Sepsis) situational response   Afebrile, remains alert/oriented. Vital signs stable, cardiac monitoring continued.

## 2017-07-17 NOTE — CONSULTS
Consult Note  Infectious Disease    Consult Requested By: Marguerite Arana MD    Reason for Consult: staph aureus sepsis and strep viridans sepsis    SUBJECTIVE:     History of Present Illness:  Patient is a 77 y.o. male presents with  Abdominal pain. He was seen in ed on 7/11/17 with bilateral lower quadrant pain and was diagnosed with acute diverticulitis when a ct abdomen showed mild sigmoid stranding. He was rx with cipro and flagyl and did not improve. He now returns with decreasing abdominal pain and fatigue. He denies fever, sobar or chest pains. 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 He developed mitral regurgitation and several episodes of chf.he went home with hospice after he declined operative intervention. He sought a second opinion at [facility] and was declined as a surgical candidate. Repeat blood cultures on 12/8/15,6/28/16,10/3/16 were all negative. He was comitted to 6 weeks of iv rocephin and stopped iv abx 1/22/16. He was then placed on po keflex 500 mg q 12 hours and apparently has continued that modality but has missed the occasional dose. He was last seen by an id np at [facility] in October 2016. His tte on 12/19/15 and 1/25/16 continued to show a small mitral valve mass with mr- this was felt to have been sterilised and no surgery was undertaken.  His blood cultures form 7/11/17 now are positive for a staph aureus and a strep viridans. His repeat blood cultures taken while on antibiotics are negative form 7/13/17.    Past Medical History:   Diagnosis Date    MI, old 2014     Past Surgical History:   Procedure Laterality Date    AORTIC VALVE REPLACEMENT  2014    CARDIAC SURGERY      CARDIAC VALVE SURGERY      Bio AVR 2014    COLON SURGERY  2000    CORONARY ARTERY BYPASS GRAFT  2014    x2    TONSILLECTOMY       History reviewed. No pertinent family  history.  Social History   Substance Use Topics    Smoking status: Never Smoker    Smokeless tobacco: Never Used    Alcohol use Yes      Comment: 2-3 X'S A WEEK       Review of patient's allergies indicates:  No Known Allergies     Antibiotics     Start     Stop Route Frequency Ordered    07/16/17 1500  vancomycin (VANCOCIN) 1,250 mg in dextrose 5 % 250 mL IVPB      -- IV Every 12 hours (non-standard times) 07/16/17 1400          Review of Systems:  Constitutional: no fever or chills  Eyes: no visual changes  ENT: no nasal congestion or sore throat  Respiratory: no cough or shortness of breath  Cardiovascular: no chest pain or palpitations  Gastrointestinal: positive for abdominal pain  Genitourinary: no hematuria or dysuria  Hematologic/Lymphatic: no easy bruising or lymphadenopathy  Musculoskeletal: no arthralgias or myalgias  Neurological: no seizures or tremors    OBJECTIVE:     Vital Signs (Most Recent)  Temp: 98.5 °F (36.9 °C) (07/17/17 0752)  Pulse: 89 (07/17/17 0752)  Resp: 18 (07/17/17 0752)  BP: (!) 165/77 (07/17/17 0752)  SpO2: 96 % (07/17/17 0752)    Temperature Range Min/Max (Last 24H):  Temp:  [97.5 °F (36.4 °C)-98.9 °F (37.2 °C)]     Physical Exam:  General: well developed, well nourished  HENT: Head:normocephalic, atraumatic. Ears:not examined. Nose: Nares normal. Septum midline. Mucosa normal. No drainage or sinus tenderness., no discharge. Throat: lips, mucosa, and tongue normal; teeth and gums normal and no throat erythema.  Eyes: conjunctivae/corneas clear. PERRL.   Neck: supple, symmetrical, trachea midline, no JVD and thyroid not enlarged, symmetric, no tenderness/mass/nodules  Lungs:  clear to auscultation bilaterally and normal respiratory effort  Cardiovascular: Heart: loud psm lse 4/6. Chest Wall: no tenderness. Extremities: no cyanosis or edema, or clubbing. Pulses: 2+ and symmetric.  Abdomen/Rectal: Abdomen: soft, non-tender non-distented; bowel sounds normal; no masses,  no  organomegaly. Rectal: not examined  Skin: Skin color, texture, turgor normal. No rashes or lesions  Musculoskeletal:no clubbing, cyanosis  Lymph Nodes: No cervical or supraclavicular adenopathy    Laboratory:  CBC    Recent Labs  Lab 07/17/17  0501   WBC 12.50   RBC 4.02*   HGB 10.7*   HCT 33.2*        BMP    Recent Labs  Lab 07/17/17  0501   CO2 24   BUN 17   CREATININE 1.1   CALCIUM 9.1       Recent Labs  Lab 07/11/17  1816   COLORU Yellow   CLARITYU Clear   SPECGRAV 1.025   NITRITE Negative*   LEUKOCYTESUR Negative*   UROBILINOGEN 0.2     Microbiology Results (last 7 days)     ** No results found for the last 168 hours. **          Diagnostic Results:  Labs: Reviewed  X-Ray: Reviewed  Echo: Reviewed    ASSESSMENT/PLAN:     Active Hospital Problems    Diagnosis  POA    *Endocarditis of mitral valve [I05.8]  Yes    Positive blood cultures [R78.81]  Unknown    Bacteremia due to Staphylococcus aureus [R78.81]  Yes    Diverticulitis of large intestine without bleeding [K57.32]  Yes    Depression [F32.9]  Yes    Chronic kidney disease, stage III (moderate) [N18.3]  Yes    Hypothyroidism [E03.9]  Yes    History of colon cancer [Z85.038]  Yes    Hyperlipidemia [E78.5]  Yes    S/P CABG (coronary artery bypass graft) [Z95.1]  Not Applicable    S/P AVR [Z95.2]  Not Applicable      Resolved Hospital Problems    Diagnosis Date Resolved POA   No resolved problems to display.       1. Staph aureus(mrsa) and strep salivarius in blood. He has a prosthetic aortic valve and has had ? Partially sterilised vegetation on mitral valve since 2015    He is apparently not a surgical candidate  He has not had staph aureus in blood before  Suggest   Sai  Repeat ct abdomen and pelvis to ensure no abscess etc- looks better  Iv rocephin and vancomycin pending sensitivities  Will likely need 6 weeks of iv abx again, no guarantee of success   I cannot rule out contaminated cultures and have to rx as with prosthetic aortic valve  and mitral vegetation  KHALIDA DECLINED BY PONCE  HE IS FULLY AWARE THAT A TTE DOES NOT VISUALISE THE PROSTHETIC VALVE WELL  HE IS AWARE THAT A PROSTHETIC VALVE ENDOCARDITIS HAS GRAVE CONNOTATIONS OF DEATH AND FAILURE OF VALVE  He still declines procedure and opts for 6 weeks of iv abx  He thinks he may be moving soon to [facility]- he is aware I cannot arrange c etc in the midst of rx course should a move be made there- I advised he stay locally till rx complete  I will ask for picc and 6 weeks pf iv abx with no assurance of cure  He looks well and hopefully those initial blood cultures were simple contaminants

## 2017-07-17 NOTE — NURSING
Patient expressed concern regarding KHALIDA scheduled in am, verbal and printed educational material given.  Would like further information on procedure, but as of now states will not consent . Will remain npo as ordered.

## 2017-07-17 NOTE — ASSESSMENT & PLAN NOTE
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 Per ID documentation 12/2015 [facility] KHALIDA showed vegetation on aortic and mitral valve.     Per their discharge summary, cultures had cleared at time of discharge on 11/27.  1/25/16 TTE shows mobile mass on mitral valve and resolved vegetation on aortic valve. Patient was treated with IV rocephin x 6 weeks then placed on suppressive antibiotic Keflex and plan was for repeat 2 D echo in Jan 2017 however patient have missed appointment with ID.he was also non compliant with Keflex.CT   had no plan for intervention, Obtained 2 D echo,show same seize MV regurgitation, inflammatory markers elevated, Consulted ID,on Rocephin and vanc.cardiology planing for KHALIDA today,but patient refusing at this time..patient has been informed no guarranty for resolution of infection.he is non compliant with medical treatment,will place picc line today.need 6 weeks IV Abx,vanc through is low,dosage by pharmacy has been adjusted,follow through.

## 2017-07-17 NOTE — CONSULTS
Order received for HH.  Pt offered list of HH providers.  Patient choice form completed. White (original) copy to Pt's folder, Yellow copy to Pt.  Pt requested  [facility].  Orders, facesheet, H&P and DC summary sent via [facility] to: [facility].    Awaiting acceptance to confirm services will be provided.  SAURABH Martin RN; [facility] notified of referral.     Order for IV infusion.  Referal sent via right [facility] to [facility].    1630:  [facility] accepted in right care.    Call received from Cecil at [facility] who will come to demonstrate to patient tomorrow.    7/18:  Per Cecil at [facility], patient unable to stay awake while demonstrating IV administration.  Wife present but unable to teach back.  Son attempting to hire someone to provide service daily.  TN spoke with Dr Swan, will revisit in am.    7/19 1120:  Son provided numbers of people who will help the patient at home.  TN spoke with number 1 contact:  Shobha who stated she can meet the  nurse at 8am for a demonstration.  TN spoke with SAURABH Martin RN, CCM-RN; [facility] of [facility] who arranged teaching at 8am tomorrow and notified Ms Shobha.  TN also spoke with Cecil at [facility] who stated that IV ABX will be delivered today by 7pm.    1500:  Per Dr Swan, discharge held due to electrolyte imbalance.    1524:  SAURABH Martin and Cecil of [facility] notified DC held.

## 2017-07-18 PROBLEM — R33.9 URINARY RETENTION: Status: ACTIVE | Noted: 2017-07-18

## 2017-07-18 PROBLEM — R78.81 POSITIVE BLOOD CULTURES: Status: RESOLVED | Noted: 2017-07-17 | Resolved: 2017-07-18

## 2017-07-18 LAB
ANION GAP SERPL CALC-SCNC: 9 MMOL/L
BACTERIA BLD CULT: NORMAL
BACTERIA BLD CULT: NORMAL
BASOPHILS # BLD AUTO: 0.06 K/UL
BASOPHILS NFR BLD: 0.4 %
BUN SERPL-MCNC: 20 MG/DL
CALCIUM SERPL-MCNC: 9.2 MG/DL
CHLORIDE SERPL-SCNC: 96 MMOL/L
CO2 SERPL-SCNC: 22 MMOL/L
CREAT SERPL-MCNC: 1.2 MG/DL
DIFFERENTIAL METHOD: ABNORMAL
EOSINOPHIL # BLD AUTO: 0.7 K/UL
EOSINOPHIL NFR BLD: 4.6 %
ERYTHROCYTE [DISTWIDTH] IN BLOOD BY AUTOMATED COUNT: 15.2 %
EST. GFR  (AFRICAN AMERICAN): >60 ML/MIN/1.73 M^2
EST. GFR  (NON AFRICAN AMERICAN): 58 ML/MIN/1.73 M^2
GLUCOSE SERPL-MCNC: 138 MG/DL
HCT VFR BLD AUTO: 34 %
HGB BLD-MCNC: 10.9 G/DL
LYMPHOCYTES # BLD AUTO: 2.8 K/UL
LYMPHOCYTES NFR BLD: 18.7 %
MCH RBC QN AUTO: 26.5 PG
MCHC RBC AUTO-ENTMCNC: 32.1 %
MCV RBC AUTO: 83 FL
MONOCYTES # BLD AUTO: 1.8 K/UL
MONOCYTES NFR BLD: 12.3 %
NEUTROPHILS # BLD AUTO: 9.5 K/UL
NEUTROPHILS NFR BLD: 64 %
PLATELET # BLD AUTO: 361 K/UL
PMV BLD AUTO: 10.2 FL
POTASSIUM SERPL-SCNC: 4.5 MMOL/L
RBC # BLD AUTO: 4.11 M/UL
SODIUM SERPL-SCNC: 127 MMOL/L
VANCOMYCIN TROUGH SERPL-MCNC: 21.3 UG/ML
WBC # BLD AUTO: 14.78 K/UL

## 2017-07-18 PROCEDURE — 25000003 PHARM REV CODE 250: Performed by: HOSPITALIST

## 2017-07-18 PROCEDURE — 63600175 PHARM REV CODE 636 W HCPCS: Performed by: HOSPITALIST

## 2017-07-18 PROCEDURE — 25000003 PHARM REV CODE 250: Performed by: NURSE PRACTITIONER

## 2017-07-18 PROCEDURE — 12000002 HC ACUTE/MED SURGE SEMI-PRIVATE ROOM

## 2017-07-18 PROCEDURE — 99232 SBSQ HOSP IP/OBS MODERATE 35: CPT | Mod: ,,, | Performed by: INTERNAL MEDICINE

## 2017-07-18 PROCEDURE — 97530 THERAPEUTIC ACTIVITIES: CPT

## 2017-07-18 PROCEDURE — 85025 COMPLETE CBC W/AUTO DIFF WBC: CPT

## 2017-07-18 PROCEDURE — 80202 ASSAY OF VANCOMYCIN: CPT

## 2017-07-18 PROCEDURE — 80048 BASIC METABOLIC PNL TOTAL CA: CPT

## 2017-07-18 RX ORDER — TAMSULOSIN HYDROCHLORIDE 0.4 MG/1
0.4 CAPSULE ORAL DAILY
Status: DISCONTINUED | OUTPATIENT
Start: 2017-07-18 | End: 2017-07-24

## 2017-07-18 RX ORDER — RAMELTEON 8 MG/1
8 TABLET ORAL NIGHTLY PRN
Status: DISCONTINUED | OUTPATIENT
Start: 2017-07-18 | End: 2017-07-25 | Stop reason: HOSPADM

## 2017-07-18 RX ADMIN — VANCOMYCIN HYDROCHLORIDE 1250 MG: 1 INJECTION, POWDER, LYOPHILIZED, FOR SOLUTION INTRAVENOUS at 03:07

## 2017-07-18 RX ADMIN — VANCOMYCIN HYDROCHLORIDE 1250 MG: 1 INJECTION, POWDER, LYOPHILIZED, FOR SOLUTION INTRAVENOUS at 02:07

## 2017-07-18 RX ADMIN — SERTRALINE HYDROCHLORIDE 50 MG: 50 TABLET ORAL at 08:07

## 2017-07-18 RX ADMIN — ONDANSETRON 8 MG: 8 TABLET, ORALLY DISINTEGRATING ORAL at 09:07

## 2017-07-18 RX ADMIN — POLYETHYLENE GLYCOL 3350 17 G: 17 POWDER, FOR SOLUTION ORAL at 08:07

## 2017-07-18 RX ADMIN — BUPROPION HYDROCHLORIDE 300 MG: 150 TABLET, FILM COATED, EXTENDED RELEASE ORAL at 08:07

## 2017-07-18 RX ADMIN — POLYETHYLENE GLYCOL 3350 17 G: 17 POWDER, FOR SOLUTION ORAL at 09:07

## 2017-07-18 RX ADMIN — METOPROLOL SUCCINATE 25 MG: 25 TABLET, EXTENDED RELEASE ORAL at 08:07

## 2017-07-18 RX ADMIN — TAMSULOSIN HYDROCHLORIDE 0.4 MG: 0.4 CAPSULE ORAL at 02:07

## 2017-07-18 RX ADMIN — ATORVASTATIN CALCIUM 10 MG: 10 TABLET, FILM COATED ORAL at 08:07

## 2017-07-18 RX ADMIN — HEPARIN SODIUM 5000 UNITS: 5000 INJECTION, SOLUTION INTRAVENOUS; SUBCUTANEOUS at 06:07

## 2017-07-18 RX ADMIN — RAMELTEON 8 MG: 8 TABLET, FILM COATED ORAL at 09:07

## 2017-07-18 RX ADMIN — PANTOPRAZOLE SODIUM 40 MG: 40 TABLET, DELAYED RELEASE ORAL at 08:07

## 2017-07-18 RX ADMIN — LEVOTHYROXINE SODIUM 75 MCG: 75 TABLET ORAL at 06:07

## 2017-07-18 RX ADMIN — ASPIRIN 81 MG: 81 TABLET, COATED ORAL at 08:07

## 2017-07-18 RX ADMIN — LISINOPRIL 20 MG: 20 TABLET ORAL at 08:07

## 2017-07-18 RX ADMIN — HEPARIN SODIUM 5000 UNITS: 5000 INJECTION, SOLUTION INTRAVENOUS; SUBCUTANEOUS at 02:07

## 2017-07-18 RX ADMIN — HEPARIN SODIUM 5000 UNITS: 5000 INJECTION, SOLUTION INTRAVENOUS; SUBCUTANEOUS at 09:07

## 2017-07-18 RX ADMIN — AMLODIPINE BESYLATE 10 MG: 5 TABLET ORAL at 08:07

## 2017-07-18 NOTE — NURSING
Patient placed on contact precaution. Resumed medications and diet after refusing Transesophageal Echocardiography. Vancomycin antibiotic continued. Denies any pain. No injury or falls.

## 2017-07-18 NOTE — PROGRESS NOTES
NOAH Paz and Angie, verified that the patient is wearing Telebox #[id number] and that it is visible on the monitor

## 2017-07-18 NOTE — PLAN OF CARE
Problem: Patient Care Overview  Goal: Plan of Care Review  Outcome: Ongoing (interventions implemented as appropriate)  Pt w/IV abx per order, able to address needs but w/some episodes of confusion, family at bedside, being followed by ID, pt to now possibly go to SNF for x6 wks of abx, safety maintained, bed low locked and in position, will cont plan of care

## 2017-07-18 NOTE — ASSESSMENT & PLAN NOTE
On 7.11.17 ,Source infected mitral valves,endocarditis,,on Vanc.repeat blood culture on 7.13.17 no growth sofar.placed picc line 7/17/2017.follow vanc. Through level.

## 2017-07-18 NOTE — ASSESSMENT & PLAN NOTE
Appears to be resolving as no further abdominal pain or NVD. Had formed BM. Need repeat colonoscopy as out patient after infectious process resolve.  Off cipro and flagyl

## 2017-07-18 NOTE — PROGRESS NOTES
[facility] Hospital Medicine  Progress Note    Patient Name: Rolo Blanca  MRN: [id number]  Patient Class: IP- Inpatient   Admission Date: 7/13/2017  Length of Stay: 5 days  Attending Physician: Meeta Swan MD  Primary Care Provider: Scott Valadez MD        Subjective:     Principal Problem:Endocarditis of mitral valve    HPI:  The patient is a 76 y/o male with a significant history of colon cancer 2000 sp partial colon resection, hypertension, hyperlipidemia, TIAs, CAD s/p MI/CABG 2014, AVR- bioprosthetic valve and AVR and MV endocarditis 2015 now on suppressive antibiotics (currently followed by ID [facility] who was told by [facility] to return to ED for 7/11positive blood cultures 2/4 (one bottle GPC chain strep, one bottle GPC cluster staph). Patient then transferred to [facility] for further evaluation. Patient initially went to [place] ED on 7/11 with complaints of abdominal pain, nausea, and vomiting which started 3 days ago then found to have on CT abdomen  sigmoid diverticulitis then treated with cipro and metronidazole. Patient reported today abdominal pain and vomiting resolved and had a normal formed bowel movement. Last episode of nausea and vomiting was last night. Also c/o intermittent dizziness upon position changes but denies chest pain or  shortness of breath. He denies fever, chills, URI symptoms and headaches.     Family concern about suppressive antibiotic Keflex for two years after endocarditis episode. Patient missed ID appointment as plan was for repeat 2 D echo in Jan 2017 to evaluate for resolved vegetation on MV).       Hospital Course:  The patient is a 76 y/o male with a significant history of colon cancer 2000 sp partial colon resection, hypertension, hyperlipidemia, TIAs, CAD s/p MI/CABG 2014, AVR- bioprosthetic valve and AVR and MV endocarditis 2015 now on suppressive antibiotics (currently followed by ID [facility]) who was told by [facility] to  return to ED for 7/11positive blood cultures 2/4 (one bottle GPC chain strep, one bottle GPC cluster staph). Patient then transferred to [facility] for further evaluation. Patient initially went to [place] ED on 7/11 with complaints of abdominal pain, nausea, and vomiting which started 3 days ago then found to have on CT abdomen  sigmoid diverticulitis then treated with cipro and metronidazole. Patient had some  abdominal pain and vomiting resolved and had a normal formed bowel movement. On cipro and flagyl.    - MRSA and strep viridance on blood culture on 7.11.17.   - repeat blood culture on 7.13.17 show no growth   - echo 7/14/2017 show persistent mitral vegetation,same size as before, AV is stable with bioprosthesis     -Family concern about suppressive antibiotic Keflex for two years after endocarditis episode.he was non compliant with Keflex. Patient missed ID appointment as plan was for repeat 2 D echo in Jan 2017 to evaluate for resolved vegetation on MV.  -picc line placed 7/17/2017  - plan for 6 weeks IV vancomycin  - ID to follow. Son concerned about patient not able to care for himself and do antibiotics. Currently looking for someone to hire and assist in the home vs possible SNF.    -Urinary retention and h/o increased frequency, started flomax   -dc held due to uncertainty of IV Abx at home           Interval History: pt very concerned about going home and not doing well, seems very forgetful, difficulty urinating today and started on flomax, dc home held    Review of Systems   Constitutional: Negative for appetite change and chills.   HENT: Negative for congestion, ear discharge and rhinorrhea.    Eyes: Negative for pain, redness and itching.   Respiratory: Negative for cough, chest tightness, shortness of breath, wheezing and stridor.    Cardiovascular: Negative for chest pain and leg swelling.   Gastrointestinal: Negative for abdominal pain, constipation, diarrhea, nausea and vomiting.   Endocrine:  Negative for cold intolerance and polydipsia.   Genitourinary: Negative for dysuria and hematuria.   Musculoskeletal: Negative for arthralgias, joint swelling and myalgias.   Skin: Negative for color change and pallor.   Neurological: Negative for syncope, light-headedness and headaches.   Psychiatric/Behavioral: Negative for confusion and hallucinations. The patient is not nervous/anxious.      Objective:     Vital Signs (Most Recent):  Temp: 98.4 °F (36.9 °C) (07/18/17 1103)  Pulse: 87 (07/18/17 1103)  Resp: 18 (07/18/17 1103)  BP: 122/61 (07/18/17 1103)  SpO2: 95 % (07/18/17 1103) Vital Signs (24h Range):  Temp:  [97.8 °F (36.6 °C)-98.4 °F (36.9 °C)] 98.4 °F (36.9 °C)  Pulse:  [77-88] 87  Resp:  [18-20] 18  SpO2:  [95 %-97 %] 95 %  BP: (122-155)/(61-78) 122/61     Weight: 97.7 kg (215 lb 4.8 oz)  Body mass index is 32.26 kg/m².    Intake/Output Summary (Last 24 hours) at 07/18/17 1527  Last data filed at 07/18/17 1404   Gross per 24 hour   Intake             1220 ml   Output                0 ml   Net             1220 ml      Physical Exam   Constitutional: He is oriented to person, place, and time. He appears well-developed and well-nourished. No distress.   HENT:   Head: Normocephalic and atraumatic.   Right Ear: External ear normal.   Left Ear: External ear normal.   Eyes: Conjunctivae and EOM are normal. Pupils are equal, round, and reactive to light.   Neck: Normal range of motion. Neck supple.   Cardiovascular: Normal rate and regular rhythm.    Murmur heard.   Systolic murmur is present   Pulmonary/Chest: Effort normal and breath sounds normal. He has no wheezes. He has no rales.   Abdominal: Soft. Bowel sounds are normal. There is no tenderness. There is no guarding.   Musculoskeletal: Normal range of motion.   Neurological: He is alert and oriented to person, place, and time. No cranial nerve deficit.   Skin: Skin is warm. He is not diaphoretic.   Psychiatric: He has a normal mood and affect. His behavior  is normal. Judgment and thought content normal.   Vitals reviewed.      Significant Labs:   BMP:   Recent Labs  Lab 07/18/17  0240   *   *   K 4.5   CL 96   CO2 22*   BUN 20   CREATININE 1.2   CALCIUM 9.2     CBC:   Recent Labs  Lab 07/17/17  0501 07/18/17  0240   WBC 12.50 14.78*   HGB 10.7* 10.9*   HCT 33.2* 34.0*    361*       Significant Imaging: I have reviewed all pertinent imaging results/findings within the past 24 hours.    Assessment/Plan:      Urinary retention    Start flomax and monitor  Likely undiagnosed BPH           Bacteremia due to Staphylococcus aureus    On 7.11.17 ,Source infected mitral valves,endocarditis,,on Vanc.repeat blood culture on 7.13.17 no growth sofar.placed picc line 7/17/2017.follow vanc. Through level.          Diverticulitis of large intestine without bleeding    Appears to be resolving as no further abdominal pain or NVD. Had formed BM. Need repeat colonoscopy as out patient after infectious process resolve.  Off cipro and flagyl         Depression    Pt not compliant with Zoloft and Wellbutrin. Restart Zoloft and wellbutrin.           History of colon cancer    Pt had colonoscopy 2 years ago and reported unremarkable. Encourage follow up with GI.          Hypothyroidism    Continue Synthroid.  Lab Results   Component Value Date    TSH 1.730 02/06/2017             Chronic kidney disease, stage III (moderate)    Cr=1.8, BUN 23.GFR 44. Baseline Cr ~1.5. Continue IV fluids. Avoid nephrotoxic medications.        Hyperlipidemia    Pt not compliant with current statin regiment. Continue and encourage daily statin intake.   Lab Results   Component Value Date    LDLCALC 65.2 06/17/2017                           S/P AVR    See above, endocarditis.        S/P CABG (coronary artery bypass graft)    No acute issues. Continue ASA and statin.          * Endocarditis of mitral valve      History of strep. Viridans endocarditis of bioprosthetic (porcine) aortic valve and  native mitral valve. Per ID documentation 12/2015 [facility] KHALIDA showed vegetation on aortic and mitral valve.     Per their discharge summary, cultures had cleared at time of discharge on 11/27.  1/25/16 TTE shows mobile mass on mitral valve and resolved vegetation on aortic valve. Patient was treated with IV rocephin x 6 weeks then placed on suppressive antibiotic Keflex and plan was for repeat 2 D echo in Jan 2017 however patient have missed appointment with ID.he was also non compliant with Keflex.CT   had no plan for intervention, Obtained 2 D echo,show same seize MV regurgitation, inflammatory markers elevated, Consulted ID,on vanc.  cardiology planing for KHALIDA today,but patient refusing at this time..patient has been informed no guarranty for resolution of infection.he is non compliant with medical treatment, picc line placed.need 6 weeks IV Abx  -awaiting for son to respond with plan, hire help at home vs placement at SNF          VTE Risk Mitigation         Ordered     heparin (porcine) injection 5,000 Units  Every 8 hours     Route:  Subcutaneous        07/14/17 0640     Place WALDO hose  Until discontinued      07/13/17 1843     Place sequential compression device  Until discontinued      07/13/17 1843          Meeta Swan MD  Department of Hospital Medicine   [facility]

## 2017-07-18 NOTE — PT/OT/SLP PROGRESS
"Physical Therapy  Treatment    Rolo Blanca   MRN: [id number]   Admitting Diagnosis: Endocarditis of mitral valve    PT Received On: 07/18/17  PT Start Time: 1342     PT Stop Time: 1359    PT Total Time (min): 17 min       Billable Minutes:  Therapeutic Activity 17    Treatment Type: Treatment  PT/PTA: PTA     PTA Visit Number: 1       General Precautions: Standard, fall, contact   Orthopedic Precautions: N/A   Braces: N/A         Subjective:  Communicated with nurse Gutierrez prior to session.  Pt agreeable to therapy. " I didn't sleep last night "      Pain/Comfort  Pain Rating 1: 0/10  Pain Rating Post-Intervention 1: 0/10    Objective:   Patient found with: telemetry    Functional Mobility:  Bed Mobility:   Rolling/Turning Right: Supervision  Scooting/Bridging: Supervision  Supine to Sit: Supervision  Sit to Supine: Stand by Assistance    Transfers: from bed, VC's for safety technique .   Sit <> Stand Assistance: Contact Guard Assistance  Sit <> Stand Assistive Device: No Assistive Device    Gait:   Gait Distance: 4-5 side steps to HOB . Limited with gait training 2* pt fatigue, pt stated ' not today "   Assistance 1: Contact Guard Assistance  Gait Assistive Device: Hand held assist  Gait Pattern: 3-point gait  Gait Deviation(s): decreased radhika, increased time in double stance, decreased velocity of limb motion, decreased step length, decreased swing-to-stance ratio, decreased weight-shifting ability      Balance:   Static Sit: GOOD-: Takes MODERATE challenges from all directions but inconsistently  Dynamic Sit: GOOD-: Maintains balance through MODERATE excursions of active trunk movement,     Static Stand: FAIR+: Takes MINIMAL challenges from all directions  Dynamic stand: FAIR: Needs CONTACT GUARD during gait     Therapeutic Activities and Exercises:  Pt performed seated BLE x 15 reps : LAQ, AP, pillow squeezes. VC's for proper technique and sequence.      AM-PAC 6 CLICK MOBILITY  How much help from " another person does this patient currently need?   1 = Unable, Total/Dependent Assistance  2 = A lot, Maximum/Moderate Assistance  3 = A little, Minimum/Contact Guard/Supervision  4 = None, Modified [place]/Independent    Turning over in bed (including adjusting bedclothes, sheets and blankets)?: 4  Sitting down on and standing up from a chair with arms (e.g., wheelchair, bedside commode, etc.): 4  Moving from lying on back to sitting on the side of the bed?: 4  Moving to and from a bed to a chair (including a wheelchair)?: 3  Need to walk in hospital room?: 3  Climbing 3-5 steps with a railing?: 3  Total Score: 21    AM-PAC Raw Score CMS G-Code Modifier Level of Impairment Assistance   6 % Total / Unable   7 - 9 CM 80 - 100% Maximal Assist   10 - 14 CL 60 - 80% Moderate Assist   15 - 19 CK 40 - 60% Moderate Assist   20 - 22 CJ 20 - 40% Minimal Assist   23 CI 1-20% SBA / CGA   24 CH 0% Independent/ Mod I     Patient left HOB elevated with all lines intact, call button in reach, nurse notified and spouse present.    Assessment:  Rolo Blanca is a 77 y.o. male with a medical diagnosis of Endocarditis of mitral valve and presents with weakness, decreased endurance and functional mobility. Pt will benefit from further skilled therapy in order to get back to PLOF.    Rehab identified problem list/impairments: Rehab identified problem list/impairments: weakness, decreased lower extremity function, decreased upper extremity function, decreased safety awareness, decreased coordination, decreased ROM    Rehab potential is good.    Activity tolerance: Fair     Discharge recommendations:   Discharge Facility/Level Of Care Needs: home (with assistance from spouse as needed)     Barriers to discharge:  none     Equipment recommendations:   none     GOALS:    Physical Therapy Goals        Problem: Physical Therapy Goal    Goal Priority Disciplines Outcome Goal Variances Interventions   Physical Therapy Goal      PT/OT, PT      Description:  Goals to be met by: 17    Patient will increase functional independence with mobility by performin. Sit to stand transfer with Modified [place]  2. Gait  x 250 feet with Modified [place]   3. Ascend/descend 4 stair with bilateral Handrails Modified [place]   4. Lower extremity exercise program x30 reps per handout, with independence                      PLAN:    Patient to be seen 5 x/week  to address the above listed problems via gait training, therapeutic activities, therapeutic exercises  Plan of Care expires: 17  Plan of Care reviewed with: patient         Mary Kate Jimenez, PTA  2017

## 2017-07-18 NOTE — PROGRESS NOTES
"Rolo Blanca is a 77 y.o. male patient.    Active Hospital Problems    Diagnosis  POA    *Endocarditis of mitral valve [I05.8]  Yes    Positive blood cultures [R78.81]  Yes    Bacteremia due to Staphylococcus aureus [R78.81]  Yes    Diverticulitis of large intestine without bleeding [K57.32]  Yes    Depression [F32.9]  Yes    Chronic kidney disease, stage III (moderate) [N18.3]  Yes    Hypothyroidism [E03.9]  Yes    History of colon cancer [Z85.038]  Yes    Hyperlipidemia [E78.5]  Yes    S/P CABG (coronary artery bypass graft) [Z95.1]  Not Applicable    S/P AVR [Z95.2]  Not Applicable      Resolved Hospital Problems    Diagnosis Date Resolved POA   No resolved problems to display.     Temp: 97.8 °F (36.6 °C) (07/18/17 0802)  Pulse: 84 (07/18/17 0802)  Resp: 18 (07/18/17 0802)  BP: (!) 143/78 (07/18/17 0802)  SpO2: 96 % (07/18/17 0802)  Weight: 97.7 kg (215 lb 4.8 oz) (07/18/17 0346)  Height: 5' 8.5" (174 cm) (07/13/17 1859)    Subjective:  Symptoms:  Stable.    Diet:  Poor intake.      Objective:  Vital signs: (most recent): Blood pressure (!) 143/78, pulse 84, temperature 97.8 °F (36.6 °C), temperature source Oral, resp. rate 18, height 5' 8.5" (1.74 m), weight 97.7 kg (215 lb 4.8 oz), SpO2 96 %.    Lungs:  Normal effort and normal respiratory rate.    Heart: Normal rate.  Regular rhythm.      Assessment & Plan     Echo 7/14/17    1 - Normal left ventricular systolic function (EF 55-60%).     2 - Aortic valve bioprosthesis effective prosthetic valve area corrected for BSA is 1.34 cm2.     3 - Pulmonary hypertension. The estimated PA systolic pressure is 49 mmHg.     4 - Evidence of mitral vegetation - similar in appearance to echo done 1/25/16.     5 - Moderate mitral regurgitation.     6 - Mild tricuspid regurgitation.      Endocarditis - partially treated now with recurrent positive blood cultures. MV vegetation stable in size with moderate MR, no AVR vegetation seen. KHALIDA recommended - patient " currently refuses. Abx per ID. Not deemed to be a surgical candidate at [facility]  AVR - bioprosthetic 2014 - adequate function on echo  CAD/CABG 2014  HTN  HLD  Hx colon CA  Hx TIA    Refuses KHALIDA - continue Rx  Will f/u prn    Gurinder Pedersen MD  7/18/2017

## 2017-07-18 NOTE — CONSULTS
Consult Note  Infectious Disease    Consult Requested By: Meeta Swan MD    Reason for Consult: staph aureus sepsis and strep viridans sepsis    SUBJECTIVE:     History of Present Illness:  Patient is a 77 y.o. male presents with  Abdominal pain. He was seen in ed on 7/11/17 with bilateral lower quadrant pain and was diagnosed with acute diverticulitis when a ct abdomen showed mild sigmoid stranding. He was rx with cipro and flagyl and did not improve. He now returns with decreasing abdominal pain and fatigue. He denies fever, sobar or chest pains. 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 He developed mitral regurgitation and several episodes of chf.he went home with hospice after he declined operative intervention. He sought a second opinion at [facility] and was declined as a surgical candidate. Repeat blood cultures on 12/8/15,6/28/16,10/3/16 were all negative. He was comitted to 6 weeks of iv rocephin and stopped iv abx 1/22/16. He was then placed on po keflex 500 mg q 12 hours and apparently has continued that modality but has missed the occasional dose. He was last seen by an id np at [facility] in October 2016. His tte on 12/19/15 and 1/25/16 continued to show a small mitral valve mass with mr- this was felt to have been sterilised and no surgery was undertaken.  His blood cultures form 7/11/17 now are positive for a staph aureus and a strep viridans. His repeat blood cultures taken while on antibiotics are negative form 7/13/17.    Past Medical History:   Diagnosis Date    MI, old 2014     Past Surgical History:   Procedure Laterality Date    AORTIC VALVE REPLACEMENT  2014    CARDIAC SURGERY      CARDIAC VALVE SURGERY      Bio AVR 2014    COLON SURGERY  2000    CORONARY ARTERY BYPASS GRAFT  2014    x2    TONSILLECTOMY       History reviewed. No pertinent family  history.  Social History   Substance Use Topics    Smoking status: Never Smoker    Smokeless tobacco: Never Used    Alcohol use Yes      Comment: 2-3 X'S A WEEK       Review of patient's allergies indicates:  No Known Allergies     Antibiotics     Start     Stop Route Frequency Ordered    07/16/17 1500  vancomycin (VANCOCIN) 1,250 mg in dextrose 5 % 250 mL IVPB      -- IV Every 12 hours (non-standard times) 07/16/17 1400          Review of Systems:  Constitutional: no fever or chills  Eyes: no visual changes  ENT: no nasal congestion or sore throat  Respiratory: no cough or shortness of breath  Cardiovascular: no chest pain or palpitations  Gastrointestinal: positive for abdominal pain  Genitourinary: no hematuria or dysuria  Hematologic/Lymphatic: no easy bruising or lymphadenopathy  Musculoskeletal: no arthralgias or myalgias  Neurological: no seizures or tremors    OBJECTIVE:     Vital Signs (Most Recent)  Temp: 98.4 °F (36.9 °C) (07/18/17 1103)  Pulse: 87 (07/18/17 1103)  Resp: 18 (07/18/17 1103)  BP: 122/61 (07/18/17 1103)  SpO2: 95 % (07/18/17 1103)    Temperature Range Min/Max (Last 24H):  Temp:  [97.8 °F (36.6 °C)-98.4 °F (36.9 °C)]     Physical Exam:  General: well developed, well nourished  HENT: Head:normocephalic, atraumatic. Ears:not examined. Nose: Nares normal. Septum midline. Mucosa normal. No drainage or sinus tenderness., no discharge. Throat: lips, mucosa, and tongue normal; teeth and gums normal and no throat erythema.  Eyes: conjunctivae/corneas clear. PERRL.   Neck: supple, symmetrical, trachea midline, no JVD and thyroid not enlarged, symmetric, no tenderness/mass/nodules  Lungs:  clear to auscultation bilaterally and normal respiratory effort  Cardiovascular: Heart: loud psm lse 4/6. Chest Wall: no tenderness. Extremities: no cyanosis or edema, or clubbing. Pulses: 2+ and symmetric.  Abdomen/Rectal: Abdomen: soft, non-tender non-distented; bowel sounds normal; no masses,  no organomegaly.  Rectal: not examined  Skin: Skin color, texture, turgor normal. No rashes or lesions  Musculoskeletal:no clubbing, cyanosis  Lymph Nodes: No cervical or supraclavicular adenopathy    Laboratory:  CBC    Recent Labs  Lab 07/18/17  0240   WBC 14.78*   RBC 4.11*   HGB 10.9*   HCT 34.0*   *     BMP    Recent Labs  Lab 07/18/17  0240   CO2 22*   BUN 20   CREATININE 1.2   CALCIUM 9.2       Recent Labs  Lab 07/11/17  1816   COLORU Yellow   CLARITYU Clear   SPECGRAV 1.025   NITRITE Negative*   LEUKOCYTESUR Negative*   UROBILINOGEN 0.2     Microbiology Results (last 7 days)     ** No results found for the last 168 hours. **          Diagnostic Results:  Labs: Reviewed  X-Ray: Reviewed  Echo: Reviewed    ASSESSMENT/PLAN:     Active Hospital Problems    Diagnosis  POA    *Endocarditis of mitral valve [I05.8]  Yes    Positive blood cultures [R78.81]  Yes    Bacteremia due to Staphylococcus aureus [R78.81]  Yes    Diverticulitis of large intestine without bleeding [K57.32]  Yes    Depression [F32.9]  Yes    Chronic kidney disease, stage III (moderate) [N18.3]  Yes    Hypothyroidism [E03.9]  Yes    History of colon cancer [Z85.038]  Yes    Hyperlipidemia [E78.5]  Yes    S/P CABG (coronary artery bypass graft) [Z95.1]  Not Applicable    S/P AVR [Z95.2]  Not Applicable      Resolved Hospital Problems    Diagnosis Date Resolved POA   No resolved problems to display.       1. Staph aureus(mrsa) and strep salivarius in blood. He has a prosthetic aortic valve and has had ? Partially sterilised vegetation on mitral valve since 2015    He is apparently not a surgical candidate  He has not had staph aureus in blood before  Suggest   Sai  Repeat ct abdomen and pelvis to ensure no abscess etc- looks better  Iv rocephin and vancomycin pending sensitivities  Will likely need 6 weeks of iv abx again, no guarantee of success   I cannot rule out contaminated cultures and have to rx as with prosthetic aortic valve and mitral  vegetation  KHALIDA DECLINED BY PONCE  HE IS FULLY AWARE THAT A TTE DOES NOT VISUALISE THE PROSTHETIC VALVE WELL  HE IS AWARE THAT A PROSTHETIC VALVE ENDOCARDITIS HAS GRAVE CONNOTATIONS OF DEATH AND FAILURE OF VALVE  He still declines procedure and opts for 6 weeks of iv abx  Son at bedside and aware of all of above  Dc home today likely, [facility]  patient will be self infusing at home as wife with dementia and cannot help

## 2017-07-18 NOTE — PROGRESS NOTES
"Pt w/c/o having to urinate but" nothing comes out" pt bladder scanned and 301 residual noted, MD to be informed, will monitor  "

## 2017-07-18 NOTE — SUBJECTIVE & OBJECTIVE
Interval History: pt very concerned about going home and not doing well, seems very forgetful, difficulty urinating today and started on flomax, dc home held    Review of Systems   Constitutional: Negative for appetite change and chills.   HENT: Negative for congestion, ear discharge and rhinorrhea.    Eyes: Negative for pain, redness and itching.   Respiratory: Negative for cough, chest tightness, shortness of breath, wheezing and stridor.    Cardiovascular: Negative for chest pain and leg swelling.   Gastrointestinal: Negative for abdominal pain, constipation, diarrhea, nausea and vomiting.   Endocrine: Negative for cold intolerance and polydipsia.   Genitourinary: Negative for dysuria and hematuria.   Musculoskeletal: Negative for arthralgias, joint swelling and myalgias.   Skin: Negative for color change and pallor.   Neurological: Negative for syncope, light-headedness and headaches.   Psychiatric/Behavioral: Negative for confusion and hallucinations. The patient is not nervous/anxious.      Objective:     Vital Signs (Most Recent):  Temp: 98.4 °F (36.9 °C) (07/18/17 1103)  Pulse: 87 (07/18/17 1103)  Resp: 18 (07/18/17 1103)  BP: 122/61 (07/18/17 1103)  SpO2: 95 % (07/18/17 1103) Vital Signs (24h Range):  Temp:  [97.8 °F (36.6 °C)-98.4 °F (36.9 °C)] 98.4 °F (36.9 °C)  Pulse:  [77-88] 87  Resp:  [18-20] 18  SpO2:  [95 %-97 %] 95 %  BP: (122-155)/(61-78) 122/61     Weight: 97.7 kg (215 lb 4.8 oz)  Body mass index is 32.26 kg/m².    Intake/Output Summary (Last 24 hours) at 07/18/17 1527  Last data filed at 07/18/17 1404   Gross per 24 hour   Intake             1220 ml   Output                0 ml   Net             1220 ml      Physical Exam   Constitutional: He is oriented to person, place, and time. He appears well-developed and well-nourished. No distress.   HENT:   Head: Normocephalic and atraumatic.   Right Ear: External ear normal.   Left Ear: External ear normal.   Eyes: Conjunctivae and EOM are normal.  Pupils are equal, round, and reactive to light.   Neck: Normal range of motion. Neck supple.   Cardiovascular: Normal rate and regular rhythm.    Murmur heard.   Systolic murmur is present   Pulmonary/Chest: Effort normal and breath sounds normal. He has no wheezes. He has no rales.   Abdominal: Soft. Bowel sounds are normal. There is no tenderness. There is no guarding.   Musculoskeletal: Normal range of motion.   Neurological: He is alert and oriented to person, place, and time. No cranial nerve deficit.   Skin: Skin is warm. He is not diaphoretic.   Psychiatric: He has a normal mood and affect. His behavior is normal. Judgment and thought content normal.   Vitals reviewed.      Significant Labs:   BMP:   Recent Labs  Lab 07/18/17  0240   *   *   K 4.5   CL 96   CO2 22*   BUN 20   CREATININE 1.2   CALCIUM 9.2     CBC:   Recent Labs  Lab 07/17/17  0501 07/18/17  0240   WBC 12.50 14.78*   HGB 10.7* 10.9*   HCT 33.2* 34.0*    361*       Significant Imaging: I have reviewed all pertinent imaging results/findings within the past 24 hours.

## 2017-07-18 NOTE — PLAN OF CARE
Problem: Fall Risk (Adult)  Goal: Absence of Falls  Patient will demonstrate the desired outcomes by discharge/transition of care.   Outcome: Ongoing (interventions implemented as appropriate)   07/18/17 0320   Fall Risk (Adult)   Absence of Falls making progress toward outcome   Resting quietly at intervals during night, no report of pain. Has reported feeling weak and lightheaded, vital signs stable. Cardiac monitoring and contact isolation as ordered.    Problem: Infection, Risk/Actual (Adult)  Goal: Infection Prevention/Resolution  Patient will demonstrate the desired outcomes by discharge/transition of care.   Outcome: Ongoing (interventions implemented as appropriate)   07/18/17 0320   Infection, Risk/Actual (Adult)   Infection Prevention/Resolution making progress toward outcome   Continues with iv abx as ordered, vancomycin trough pending at this time.

## 2017-07-18 NOTE — ASSESSMENT & PLAN NOTE
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 Per ID documentation 12/2015 [facility] KHALIDA showed vegetation on aortic and mitral valve.     Per their discharge summary, cultures had cleared at time of discharge on 11/27.  1/25/16 TTE shows mobile mass on mitral valve and resolved vegetation on aortic valve. Patient was treated with IV rocephin x 6 weeks then placed on suppressive antibiotic Keflex and plan was for repeat 2 D echo in Jan 2017 however patient have missed appointment with ID.he was also non compliant with [email or web address].CT   had no plan for intervention, Obtained 2 D echo,show same seize MV regurgitation, inflammatory markers elevated, Consulted ID,on vanc.  cardiology planing for KHALIDA today,but patient refusing at this time..patient has been informed no guarranty for resolution of infection.he is non compliant with medical treatment, picc line placed.need 6 weeks IV Abx  -awaiting for son to respond with plan, hire help at home vs placement at SNF

## 2017-07-18 NOTE — PLAN OF CARE
Problem: Physical Therapy Goal  Goal: Physical Therapy Goal  Goals to be met by: 17    Patient will increase functional independence with mobility by performin. Sit to stand transfer with Modified [place]  2. Gait  x 250 feet with Modified [place]   3. Ascend/descend 4 stair with bilateral Handrails Modified [place]   4. Lower extremity exercise program x30 reps per handout, with independence     Outcome: Ongoing (interventions implemented as appropriate)  Pt will benefit from further therapy in order to get back to PLOF.

## 2017-07-19 PROBLEM — I95.2 HYPOTENSION DUE TO DRUGS: Status: ACTIVE | Noted: 2017-07-19

## 2017-07-19 PROBLEM — E87.1 HYPONATREMIA: Status: ACTIVE | Noted: 2017-07-19

## 2017-07-19 LAB
ANION GAP SERPL CALC-SCNC: 7 MMOL/L
BASOPHILS # BLD AUTO: 0.03 K/UL
BASOPHILS NFR BLD: 0.2 %
BUN SERPL-MCNC: 22 MG/DL
CALCIUM SERPL-MCNC: 8.7 MG/DL
CHLORIDE SERPL-SCNC: 94 MMOL/L
CO2 SERPL-SCNC: 22 MMOL/L
CREAT SERPL-MCNC: 1.1 MG/DL
DIFFERENTIAL METHOD: ABNORMAL
EOSINOPHIL # BLD AUTO: 0.4 K/UL
EOSINOPHIL NFR BLD: 3.2 %
ERYTHROCYTE [DISTWIDTH] IN BLOOD BY AUTOMATED COUNT: 14.9 %
EST. GFR  (AFRICAN AMERICAN): >60 ML/MIN/1.73 M^2
EST. GFR  (NON AFRICAN AMERICAN): >60 ML/MIN/1.73 M^2
GLUCOSE SERPL-MCNC: 135 MG/DL
HCT VFR BLD AUTO: 29 %
HGB BLD-MCNC: 9.5 G/DL
LYMPHOCYTES # BLD AUTO: 2.1 K/UL
LYMPHOCYTES NFR BLD: 14.9 %
MCH RBC QN AUTO: 26.5 PG
MCHC RBC AUTO-ENTMCNC: 32.8 %
MCV RBC AUTO: 81 FL
MONOCYTES # BLD AUTO: 1.7 K/UL
MONOCYTES NFR BLD: 12 %
NEUTROPHILS # BLD AUTO: 9.7 K/UL
NEUTROPHILS NFR BLD: 69.7 %
PLATELET # BLD AUTO: 309 K/UL
PMV BLD AUTO: 9.7 FL
POTASSIUM SERPL-SCNC: 4.4 MMOL/L
RBC # BLD AUTO: 3.58 M/UL
SODIUM SERPL-SCNC: 121 MMOL/L
SODIUM SERPL-SCNC: 123 MMOL/L
T4 FREE SERPL-MCNC: 1.32 NG/DL
TSH SERPL DL<=0.005 MIU/L-ACNC: 3.27 UIU/ML
WBC # BLD AUTO: 13.89 K/UL

## 2017-07-19 PROCEDURE — 63600175 PHARM REV CODE 636 W HCPCS: Performed by: HOSPITALIST

## 2017-07-19 PROCEDURE — 84295 ASSAY OF SERUM SODIUM: CPT

## 2017-07-19 PROCEDURE — 84439 ASSAY OF FREE THYROXINE: CPT

## 2017-07-19 PROCEDURE — 80048 BASIC METABOLIC PNL TOTAL CA: CPT

## 2017-07-19 PROCEDURE — 25000003 PHARM REV CODE 250: Performed by: HOSPITALIST

## 2017-07-19 PROCEDURE — 36415 COLL VENOUS BLD VENIPUNCTURE: CPT

## 2017-07-19 PROCEDURE — 84443 ASSAY THYROID STIM HORMONE: CPT

## 2017-07-19 PROCEDURE — 12000002 HC ACUTE/MED SURGE SEMI-PRIVATE ROOM

## 2017-07-19 PROCEDURE — 85025 COMPLETE CBC W/AUTO DIFF WBC: CPT

## 2017-07-19 PROCEDURE — 25000003 PHARM REV CODE 250: Performed by: NURSE PRACTITIONER

## 2017-07-19 RX ORDER — AMLODIPINE BESYLATE 2.5 MG/1
2.5 TABLET ORAL DAILY
Status: DISCONTINUED | OUTPATIENT
Start: 2017-07-20 | End: 2017-07-25 | Stop reason: HOSPADM

## 2017-07-19 RX ADMIN — LISINOPRIL 20 MG: 20 TABLET ORAL at 08:07

## 2017-07-19 RX ADMIN — AMLODIPINE BESYLATE 10 MG: 5 TABLET ORAL at 08:07

## 2017-07-19 RX ADMIN — LEVOTHYROXINE SODIUM 75 MCG: 75 TABLET ORAL at 05:07

## 2017-07-19 RX ADMIN — ATORVASTATIN CALCIUM 10 MG: 10 TABLET, FILM COATED ORAL at 08:07

## 2017-07-19 RX ADMIN — TAMSULOSIN HYDROCHLORIDE 0.4 MG: 0.4 CAPSULE ORAL at 08:07

## 2017-07-19 RX ADMIN — BUPROPION HYDROCHLORIDE 300 MG: 150 TABLET, FILM COATED, EXTENDED RELEASE ORAL at 08:07

## 2017-07-19 RX ADMIN — PROMETHAZINE HYDROCHLORIDE 12.5 MG: 25 INJECTION INTRAMUSCULAR; INTRAVENOUS at 08:07

## 2017-07-19 RX ADMIN — VANCOMYCIN HYDROCHLORIDE 1250 MG: 1 INJECTION, POWDER, LYOPHILIZED, FOR SOLUTION INTRAVENOUS at 03:07

## 2017-07-19 RX ADMIN — HEPARIN SODIUM 5000 UNITS: 5000 INJECTION, SOLUTION INTRAVENOUS; SUBCUTANEOUS at 05:07

## 2017-07-19 RX ADMIN — ASPIRIN 81 MG: 81 TABLET, COATED ORAL at 08:07

## 2017-07-19 RX ADMIN — HEPARIN SODIUM 5000 UNITS: 5000 INJECTION, SOLUTION INTRAVENOUS; SUBCUTANEOUS at 10:07

## 2017-07-19 RX ADMIN — POLYETHYLENE GLYCOL 3350 17 G: 17 POWDER, FOR SOLUTION ORAL at 08:07

## 2017-07-19 RX ADMIN — METOPROLOL SUCCINATE 25 MG: 25 TABLET, EXTENDED RELEASE ORAL at 08:07

## 2017-07-19 RX ADMIN — ONDANSETRON 8 MG: 8 TABLET, ORALLY DISINTEGRATING ORAL at 06:07

## 2017-07-19 RX ADMIN — SERTRALINE HYDROCHLORIDE 50 MG: 50 TABLET ORAL at 08:07

## 2017-07-19 RX ADMIN — HEPARIN SODIUM 5000 UNITS: 5000 INJECTION, SOLUTION INTRAVENOUS; SUBCUTANEOUS at 03:07

## 2017-07-19 RX ADMIN — PANTOPRAZOLE SODIUM 40 MG: 40 TABLET, DELAYED RELEASE ORAL at 08:07

## 2017-07-19 RX ADMIN — POLYETHYLENE GLYCOL 3350 17 G: 17 POWDER, FOR SOLUTION ORAL at 10:07

## 2017-07-19 NOTE — PLAN OF CARE
Problem: Fall Risk (Adult)  Intervention: Patient Rounds   07/19/17 1600   Safety Interventions   Patient Rounds bed wheels locked;bed in low position;call light in reach;clutter free environment maintained;ID band on;placement of personal items at bedside;toileting offered;visualized patient     Intervention: Safety Promotion/Fall Prevention   07/19/17 1600   Safety Interventions   Safety Promotion/Fall Prevention room near unit station         Problem: Infection, Risk/Actual (Adult)  Intervention: Prevent Infection/Maximize Resistance   07/19/17 1600   Pain/Comfort/Sleep Interventions   Sleep/Rest Enhancement relaxation techniques promoted         Problem: Pressure Ulcer Risk (Hari Scale) (Adult,Obstetrics,Pediatric)  Intervention: Maintain Head of Bed Elevation Less Than 30 Degrees as Tolerated   07/19/17 1600   Positioning   Head of Bed (HOB) HOB at 30-45 degrees     Intervention: Turn/Reposition Often   07/16/17 0800 07/19/17 1600   Skin Interventions   Pressure Reduction Techniques frequent weight shift encouraged --    Positioning   Body Position --  positioned/repositioned independently

## 2017-07-19 NOTE — PROGRESS NOTES
[facility] Hospital Medicine  Progress Note    Patient Name: Rolo Blanca  MRN: [id number]  Patient Class: IP- Inpatient   Admission Date: 7/13/2017  Length of Stay: 6 days  Attending Physician: Meeta Swan MD  Primary Care Provider: Scott Valadez MD        Subjective:     Principal Problem:Endocarditis of mitral valve    HPI:  The patient is a 78 y/o male with a significant history of colon cancer 2000 sp partial colon resection, hypertension, hyperlipidemia, TIAs, CAD s/p MI/CABG 2014, AVR- bioprosthetic valve and AVR and MV endocarditis 2015 now on suppressive antibiotics (currently followed by ID -[facility] Adarsh Howell) who was told by [facility] to return to ED for 7/11positive blood cultures 2/4 (one bottle GPC chain strep, one bottle GPC cluster staph). Patient then transferred to [facility] for further evaluation. Patient initially went to [place] ED on 7/11 with complaints of abdominal pain, nausea, and vomiting which started 3 days ago then found to have on CT abdomen  sigmoid diverticulitis then treated with cipro and metronidazole. Patient reported today abdominal pain and vomiting resolved and had a normal formed bowel movement. Last episode of nausea and vomiting was last night. Also c/o intermittent dizziness upon position changes but denies chest pain or  shortness of breath. He denies fever, chills, URI symptoms and headaches.     Family concern about suppressive antibiotic Keflex for two years after endocarditis episode. Patient missed ID appointment as plan was for repeat 2 D echo in Jan 2017 to evaluate for resolved vegetation on MV).       Hospital Course:  The patient is a 78 y/o male with a significant history of colon cancer 2000 sp partial colon resection, hypertension, hyperlipidemia, TIAs, CAD s/p MI/CABG 2014, AVR- bioprosthetic valve and AVR and MV endocarditis 2015 now on suppressive antibiotics (currently followed by ID -[facility] Adarsh Howell) who was told by [facility] to  return to ED for 7/11positive blood cultures 2/4 (one bottle GPC chain strep, one bottle GPC cluster staph). Patient then transferred to [facility] for further evaluation. Patient initially went to [place] ED on 7/11 with complaints of abdominal pain, nausea, and vomiting which started 3 days ago then found to have on CT abdomen  sigmoid diverticulitis then treated with cipro and metronidazole. Patient had some  abdominal pain and vomiting resolved and had a normal formed bowel movement. On cipro and flagyl.    - MRSA and strep viridance on blood culture on 7.11.17.   - repeat blood culture on 7.13.17 show no growth   - echo 7/14/2017 show persistent mitral vegetation,same size as before, AV is stable with bioprosthesis     -Family concern about suppressive antibiotic Keflex for two years after endocarditis episode.he was non compliant with Keflex. Patient missed ID appointment as plan was for repeat 2 D echo in Jan 2017 to evaluate for resolved vegetation on MV.  -picc line placed 7/17/2017  - plan for 6 weeks IV vancomycin  - ID to follow. Son concerned about patient not able to care for himself and do antibiotics. Currently looking for someone to hire and assist in the home vs possible SNF.    -Urinary retention and h/o increased frequency, started flomax and retention resolved  -dc held due to drop in sodium and hypotension SBP 80s          Interval History: pt had drop in sodium 123- 121, drop in BP; dc held but apparently family has someone to administer ABX at home    Review of Systems   Constitutional: Positive for fatigue. Negative for appetite change and chills.   HENT: Negative for congestion, ear discharge and rhinorrhea.    Eyes: Negative for pain, redness and itching.   Respiratory: Negative for cough, chest tightness, shortness of breath, wheezing and stridor.    Cardiovascular: Negative for chest pain and leg swelling.   Gastrointestinal: Negative for abdominal pain, constipation, diarrhea, nausea and  vomiting.   Endocrine: Negative for cold intolerance and polydipsia.   Genitourinary: Negative for dysuria and hematuria.   Musculoskeletal: Negative for arthralgias, joint swelling and myalgias.   Skin: Negative for color change and pallor.   Neurological: Negative for syncope, light-headedness and headaches.   Psychiatric/Behavioral: Positive for sleep disturbance. Negative for confusion and hallucinations. The patient is not nervous/anxious.      Objective:     Vital Signs (Most Recent):  Temp: 97.7 °F (36.5 °C) (07/19/17 1220)  Pulse: 78 (07/19/17 1220)  Resp: 18 (07/19/17 1220)  BP: (!) 84/45 (07/19/17 1220)  SpO2: 96 % (07/19/17 1220) Vital Signs (24h Range):  Temp:  [97.7 °F (36.5 °C)-98.4 °F (36.9 °C)] 97.7 °F (36.5 °C)  Pulse:  [62-87] 78  Resp:  [17-18] 18  SpO2:  [94 %-96 %] 96 %  BP: ()/(45-68) 84/45     Weight: 98.9 kg (218 lb)  Body mass index is 32.66 kg/m².    Intake/Output Summary (Last 24 hours) at 07/19/17 1453  Last data filed at 07/19/17 0600   Gross per 24 hour   Intake              480 ml   Output              350 ml   Net              130 ml      Physical Exam   Constitutional: He is oriented to person, place, and time. He appears well-developed and well-nourished. No distress.   HENT:   Head: Normocephalic and atraumatic.   Right Ear: External ear normal.   Left Ear: External ear normal.   Eyes: Conjunctivae and EOM are normal. Pupils are equal, round, and reactive to light.   Neck: Normal range of motion. Neck supple.   Cardiovascular: Normal rate and regular rhythm.    Murmur heard.   Systolic murmur is present   Pulmonary/Chest: Effort normal and breath sounds normal. He has no wheezes. He has no rales.   Abdominal: Soft. Bowel sounds are normal. There is no tenderness. There is no guarding.   Musculoskeletal: Normal range of motion.   Neurological: He is alert and oriented to person, place, and time. No cranial nerve deficit.   Skin: Skin is warm. He is not diaphoretic.    Psychiatric: He has a normal mood and affect. His behavior is normal. Judgment and thought content normal.   Vitals reviewed.      Significant Labs:   BMP:   Recent Labs  Lab 07/19/17  0500 07/19/17  1152   *  --    * 121*   K 4.4  --    CL 94*  --    CO2 22*  --    BUN 22  --    CREATININE 1.1  --    CALCIUM 8.7  --      CBC:   Recent Labs  Lab 07/18/17  0240 07/19/17  0500   WBC 14.78* 13.89*   HGB 10.9* 9.5*   HCT 34.0* 29.0*   * 309       Significant Imaging: I have reviewed all pertinent imaging results/findings within the past 24 hours.    Assessment/Plan:      Hypotension due to drugs    DC lisinopril  Lower norvasc 2.5 mg with holding parameters  Continue metoprolol           Hyponatremia    On admit sodium 134, now down to 121  Placed on fluid restriction  DC'd lasix  Thyroid labs in normal range              Urinary retention    Start flomax and monitor  Likely undiagnosed BPH           Bacteremia due to Staphylococcus aureus    On 7.11.17 ,Source infected mitral valves,endocarditis,,on Vanc.repeat blood culture on 7.13.17 no growth sofar.placed picc line 7/17/2017.follow vanc. Through level.          Diverticulitis of large intestine without bleeding    Appears to be resolving as no further abdominal pain or NVD. Had formed BM. Need repeat colonoscopy as out patient after infectious process resolve.  Off cipro and flagyl         Depression    Pt not compliant with Zoloft and Wellbutrin. Restart Zoloft and wellbutrin.           History of colon cancer    Pt had colonoscopy 2 years ago and reported unremarkable. Encourage follow up with GI.          Hypothyroidism    Continue Synthroid.  Lab Results   Component Value Date    TSH 1.730 02/06/2017             Chronic kidney disease, stage III (moderate)    Cr=1.8, BUN 23.GFR 44. Baseline Cr ~1.5. Continue IV fluids. Avoid nephrotoxic medications.        Hyperlipidemia    Pt not compliant with current statin regiment. Continue and  encourage daily statin intake.   Lab Results   Component Value Date    LDLCALC 65.2 06/17/2017             Essential hypertension              S/P AVR    See above, endocarditis.        S/P CABG (coronary artery bypass graft)    No acute issues. Continue ASA and statin.          * Endocarditis of mitral valve      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 Per ID documentation 12/2015 [facility] KHALIDA showed vegetation on aortic and mitral valve.     Per their discharge summary, cultures had cleared at time of discharge on 11/27.  1/25/16 TTE shows mobile mass on mitral valve and resolved vegetation on aortic valve. Patient was treated with IV rocephin x 6 weeks then placed on suppressive antibiotic Keflex and plan was for repeat 2 D echo in Jan 2017 however patient have missed appointment with ID.he was also non compliant with Keflex.CT   had no plan for intervention, Obtained 2 D echo,show same seize MV regurgitation, inflammatory markers elevated, Consulted ID,on vanc.  cardiology planing for KHALIDA today,but patient refusing at this time..patient has been informed no guarranty for resolution of infection.he is non compliant with medical treatment, picc line placed.need 6 weeks IV Abx  -awaiting for son to respond with plan, hire help at home vs placement at SNF          VTE Risk Mitigation         Ordered     heparin (porcine) injection 5,000 Units  Every 8 hours     Route:  Subcutaneous        07/14/17 0640     Place WALDO hose  Until discontinued      07/13/17 1843     Place sequential compression device  Until discontinued      07/13/17 1843          Meeta Swan MD  Department of Hospital Medicine   [facility]

## 2017-07-19 NOTE — SUBJECTIVE & OBJECTIVE
Interval History: pt had drop in sodium 123- 121, drop in BP; dc held but apparently family has someone to administer ABX at home    Review of Systems   Constitutional: Positive for fatigue. Negative for appetite change and chills.   HENT: Negative for congestion, ear discharge and rhinorrhea.    Eyes: Negative for pain, redness and itching.   Respiratory: Negative for cough, chest tightness, shortness of breath, wheezing and stridor.    Cardiovascular: Negative for chest pain and leg swelling.   Gastrointestinal: Negative for abdominal pain, constipation, diarrhea, nausea and vomiting.   Endocrine: Negative for cold intolerance and polydipsia.   Genitourinary: Negative for dysuria and hematuria.   Musculoskeletal: Negative for arthralgias, joint swelling and myalgias.   Skin: Negative for color change and pallor.   Neurological: Negative for syncope, light-headedness and headaches.   Psychiatric/Behavioral: Positive for sleep disturbance. Negative for confusion and hallucinations. The patient is not nervous/anxious.      Objective:     Vital Signs (Most Recent):  Temp: 97.7 °F (36.5 °C) (07/19/17 1220)  Pulse: 78 (07/19/17 1220)  Resp: 18 (07/19/17 1220)  BP: (!) 84/45 (07/19/17 1220)  SpO2: 96 % (07/19/17 1220) Vital Signs (24h Range):  Temp:  [97.7 °F (36.5 °C)-98.4 °F (36.9 °C)] 97.7 °F (36.5 °C)  Pulse:  [62-87] 78  Resp:  [17-18] 18  SpO2:  [94 %-96 %] 96 %  BP: ()/(45-68) 84/45     Weight: 98.9 kg (218 lb)  Body mass index is 32.66 kg/m².    Intake/Output Summary (Last 24 hours) at 07/19/17 1453  Last data filed at 07/19/17 0600   Gross per 24 hour   Intake              480 ml   Output              350 ml   Net              130 ml      Physical Exam   Constitutional: He is oriented to person, place, and time. He appears well-developed and well-nourished. No distress.   HENT:   Head: Normocephalic and atraumatic.   Right Ear: External ear normal.   Left Ear: External ear normal.   Eyes: Conjunctivae and  EOM are normal. Pupils are equal, round, and reactive to light.   Neck: Normal range of motion. Neck supple.   Cardiovascular: Normal rate and regular rhythm.    Murmur heard.   Systolic murmur is present   Pulmonary/Chest: Effort normal and breath sounds normal. He has no wheezes. He has no rales.   Abdominal: Soft. Bowel sounds are normal. There is no tenderness. There is no guarding.   Musculoskeletal: Normal range of motion.   Neurological: He is alert and oriented to person, place, and time. No cranial nerve deficit.   Skin: Skin is warm. He is not diaphoretic.   Psychiatric: He has a normal mood and affect. His behavior is normal. Judgment and thought content normal.   Vitals reviewed.      Significant Labs:   BMP:   Recent Labs  Lab 07/19/17  0500 07/19/17  1152   *  --    * 121*   K 4.4  --    CL 94*  --    CO2 22*  --    BUN 22  --    CREATININE 1.1  --    CALCIUM 8.7  --      CBC:   Recent Labs  Lab 07/18/17  0240 07/19/17  0500   WBC 14.78* 13.89*   HGB 10.9* 9.5*   HCT 34.0* 29.0*   * 309       Significant Imaging: I have reviewed all pertinent imaging results/findings within the past 24 hours.

## 2017-07-19 NOTE — CONSULTS
Consult Note  Infectious Disease    Consult Requested By: Meeta Swan MD    Reason for Consult: staph aureus sepsis and strep viridans sepsis    SUBJECTIVE:     History of Present Illness:  Patient is a 77 y.o. male presents with  Abdominal pain. He was seen in ed on 7/11/17 with bilateral lower quadrant pain and was diagnosed with acute diverticulitis when a ct abdomen showed mild sigmoid stranding. He was rx with cipro and flagyl and did not improve. He now returns with decreasing abdominal pain and fatigue. He denies fever, sobar or chest pains. 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 He developed mitral regurgitation and several episodes of chf.he went home with hospice after he declined operative intervention. He sought a second opinion at [facility] and was declined as a surgical candidate. Repeat blood cultures on 12/8/15,6/28/16,10/3/16 were all negative. He was comitted to 6 weeks of iv rocephin and stopped iv abx 1/22/16. He was then placed on po keflex 500 mg q 12 hours and apparently has continued that modality but has missed the occasional dose. He was last seen by an id np at [facility] in October 2016. His tte on 12/19/15 and 1/25/16 continued to show a small mitral valve mass with mr- this was felt to have been sterilised and no surgery was undertaken.  His blood cultures form 7/11/17 now are positive for a staph aureus and a strep viridans. His repeat blood cultures taken while on antibiotics are negative form 7/13/17.    Past Medical History:   Diagnosis Date    MI, old 2014     Past Surgical History:   Procedure Laterality Date    AORTIC VALVE REPLACEMENT  2014    CARDIAC SURGERY      CARDIAC VALVE SURGERY      Bio AVR 2014    COLON SURGERY  2000    CORONARY ARTERY BYPASS GRAFT  2014    x2    TONSILLECTOMY       History reviewed. No pertinent family  history.  Social History   Substance Use Topics    Smoking status: Never Smoker    Smokeless tobacco: Never Used    Alcohol use Yes      Comment: 2-3 X'S A WEEK       Review of patient's allergies indicates:  No Known Allergies     Antibiotics     Start     Stop Route Frequency Ordered    07/16/17 1500  vancomycin (VANCOCIN) 1,250 mg in dextrose 5 % 250 mL IVPB      -- IV Every 12 hours (non-standard times) 07/16/17 1400          Review of Systems:  Constitutional: no fever or chills  Eyes: no visual changes  ENT: no nasal congestion or sore throat  Respiratory: no cough or shortness of breath  Cardiovascular: no chest pain or palpitations  Gastrointestinal: positive for abdominal pain  Genitourinary: no hematuria or dysuria  Hematologic/Lymphatic: no easy bruising or lymphadenopathy  Musculoskeletal: no arthralgias or myalgias  Neurological: no seizures or tremors    OBJECTIVE:     Vital Signs (Most Recent)  Temp: 97.7 °F (36.5 °C) (07/19/17 1220)  Pulse: 78 (07/19/17 1220)  Resp: 18 (07/19/17 1220)  BP: (!) 84/45 (07/19/17 1220)  SpO2: 96 % (07/19/17 1220)    Temperature Range Min/Max (Last 24H):  Temp:  [97.7 °F (36.5 °C)-98.4 °F (36.9 °C)]     Physical Exam:  General: well developed, well nourished  HENT: Head:normocephalic, atraumatic. Ears:not examined. Nose: Nares normal. Septum midline. Mucosa normal. No drainage or sinus tenderness., no discharge. Throat: lips, mucosa, and tongue normal; teeth and gums normal and no throat erythema.  Eyes: conjunctivae/corneas clear. PERRL.   Neck: supple, symmetrical, trachea midline, no JVD and thyroid not enlarged, symmetric, no tenderness/mass/nodules  Lungs:  clear to auscultation bilaterally and normal respiratory effort  Cardiovascular: Heart: loud psm lse 4/6. Chest Wall: no tenderness. Extremities: no cyanosis or edema, or clubbing. Pulses: 2+ and symmetric.  Abdomen/Rectal: Abdomen: soft, non-tender non-distented; bowel sounds normal; no masses,  no  organomegaly. Rectal: not examined  Skin: Skin color, texture, turgor normal. No rashes or lesions  Musculoskeletal:no clubbing, cyanosis  Lymph Nodes: No cervical or supraclavicular adenopathy    Laboratory:  CBC    Recent Labs  Lab 07/19/17  0500   WBC 13.89*   RBC 3.58*   HGB 9.5*   HCT 29.0*        BMP    Recent Labs  Lab 07/19/17  0500   CO2 22*   BUN 22   CREATININE 1.1   CALCIUM 8.7     No results for input(s): COLORU, CLARITYU, SPECGRAV, PHUR, PROTEINUA, GLUCOSEU, BILIRUBINCON, BLOODU, WBCU, RBCU, BACTERIA, MUCUS, NITRITE, LEUKOCYTESUR, UROBILINOGEN, HYALINECASTS in the last 168 hours.  Microbiology Results (last 7 days)     ** No results found for the last 168 hours. **          Diagnostic Results:  Labs: Reviewed  X-Ray: Reviewed  Echo: Reviewed    ASSESSMENT/PLAN:     Active Hospital Problems    Diagnosis  POA    *Endocarditis of mitral valve [I05.8]  Yes    Hyponatremia [E87.1]  Yes    Hypotension due to drugs [I95.2]  No    Urinary retention [R33.9]  Yes    Bacteremia due to Staphylococcus aureus [R78.81]  Yes    Diverticulitis of large intestine without bleeding [K57.32]  Yes    Depression [F32.9]  Yes    Chronic kidney disease, stage III (moderate) [N18.3]  Yes    Hypothyroidism [E03.9]  Yes    History of colon cancer [Z85.038]  Yes    Hyperlipidemia [E78.5]  Yes    S/P CABG (coronary artery bypass graft) [Z95.1]  Not Applicable    S/P AVR [Z95.2]  Not Applicable      Resolved Hospital Problems    Diagnosis Date Resolved POA    Positive blood cultures [R78.81] 07/18/2017 Yes       1. Staph aureus(mrsa) and strep salivarius in blood. He has a prosthetic aortic valve and has had ? Partially sterilised vegetation on mitral valve since 2015    He is apparently not a surgical candidate  He has not had staph aureus in blood before  Suggest   Sai  Repeat ct abdomen and pelvis to ensure no abscess etc- looks better  Iv rocephin and vancomycin pending sensitivities  Will likely need 6  weeks of iv abx again, no guarantee of success   I cannot rule out contaminated cultures and have to rx as with prosthetic aortic valve and mitral vegetation  KHALIDA DECLINED BY PATINET  HE IS FULLY AWARE THAT A TTE DOES NOT VISUALISE THE PROSTHETIC VALVE WELL  HE IS AWARE THAT A PROSTHETIC VALVE ENDOCARDITIS HAS GRAVE CONNOTATIONS OF DEATH AND FAILURE OF VALVE  He still declines procedure and opts for 6 weeks of iv abx  Dc home today likely, c has been set up  patient will be self infusing at home as wife with dementia and cannot help

## 2017-07-19 NOTE — PT/OT/SLP PROGRESS
Physical Therapy      Rolo Blanca  MRN: [id number]    Patient not seen today secondary to Other (Comment) pt BP is 84/45 . Will follow-up tomorrow.    Mary Kate Jimenez, PTA

## 2017-07-19 NOTE — PLAN OF CARE
Problem: Patient Care Overview  Goal: Plan of Care Review  Outcome: Ongoing (interventions implemented as appropriate)   07/19/17 0415   Coping/Psychosocial   Plan Of Care Reviewed With patient   Rested quietly at intervals, received med for sleep, along with scheduled meds. Voiding per urinal, drinking po fluids. No report of pain or discomfort, continues with cardiac monitoring.    Problem: Fall Risk (Adult)  Goal: Absence of Falls  Patient will demonstrate the desired outcomes by discharge/transition of care.   Outcome: Ongoing (interventions implemented as appropriate)   07/19/17 0415   Fall Risk (Adult)   Absence of Falls making progress toward outcome   Located close to nursing station, instructed patient to call for assistance when needed.    Problem: Infection, Risk/Actual (Adult)  Goal: Infection Prevention/Resolution  Patient will demonstrate the desired outcomes by discharge/transition of care.   Outcome: Ongoing (interventions implemented as appropriate)   07/19/17 0415   Infection, Risk/Actual (Adult)   Infection Prevention/Resolution making progress toward outcome   Continues with iv abx, held early am dose per Dr. Lechuga.

## 2017-07-19 NOTE — PHYSICIAN QUERY
"PT Name: Rolo Blanca  MR #: [id number]    Physician Query Form - Heart  Condition Clarification     CDS/: Jane Marquez RN CDI    Contact information: [phone]  This form is a permanent document in the medical record.     Query Date: July 19, 2017    By submitting this query, we are merely seeking further clarification of documentation. Please utilize your independent clinical judgment when addressing the question(s) below.    The medical record contains the following   Indicators     Supporting Clinical Findings Location in Medical Record    BNP     X EF  EF 55  ECHO 7/14   X Radiology findings  Mild cardiomegaly, possible mild CHF  CXR 7/13   X Echo Results  CONCLUSIONS     1 - Normal left ventricular systolic function (EF 55-60%).     2 - Aortic valve bioprosthesis effective prosthetic valve        area corrected for BSA is 1.34 cm2.     3 - Pulmonary hypertension. The estimated PA systolic      pressure is 49 mmHg.     4 - Evidence of mitral vegetation - similar in appearance   to echo done 1/25/16.     5 - Moderate mitral regurgitation.     6 - Mild tricuspid regurgitation.   Echo 7/14    "Ascites" documented      "SOB" or "YORK" documented      "Hypoxia" documented     X Heart Failure documented  in November 2015. He developed mitral regurgitation and several episodes of chf.he went home with hospice after he declined operative intervention. Cardiology consult note 7/14    "Edema" documented     X Diuretics/Meds  Lisinopril 20 mg daily - home meds   Metoprolol succinate 25 mg 24 hr tablet daily Med list - home meds    Treatment:      Other:          Provider, please specify diagnosis or diagnoses associated with above clinical findings.                               [  ] Acute Systolic Heart Failure ( EF < 40)*  [  ] Acute on Chronic Systolic Heart Failure ( EF < 40)*  [  ] Chronic Systolic Heart Failure (EF < 40)*  [  ] Acute Diastolic Heart Failure ( EF > 40)*  [  x] Acute on Chronic Diastolic Heart " Failure( EF > 40)*  [  ] Chronic Diastolic Heart Failure (EF > 40)*  [  ] Acute Combined Systolic and Diastolic Heart Failure  [  ] Acute on Chronic Combined Systolic and Diastolic Heart Failure  [  ] Chronic Combined Systolic and Diastolic Heart Failure  [  ] Other Type of Heart Failure (please specify type): _________________________  [  ] Heart Failure Ruled Out  [  ] Other (please specify): ___________________________________  [  ] Clinically Undetermined            *American Heart Association                                                                                                          Please document in your progress notes daily for the duration of treatment until resolved and include in your discharge summary.

## 2017-07-19 NOTE — NURSING
Patient reports difficulty urinating, performed bladder scan, read 29 cc. Patient also stated, thinks iv antibiotic may be causing dysuria, notified Dr. Lechuga, said okay to hold early am dose.

## 2017-07-20 LAB
ANION GAP SERPL CALC-SCNC: 6 MMOL/L
ANION GAP SERPL CALC-SCNC: 8 MMOL/L
BASOPHILS # BLD AUTO: 0.07 K/UL
BASOPHILS NFR BLD: 0.5 %
BUN SERPL-MCNC: 26 MG/DL
BUN SERPL-MCNC: 28 MG/DL
CALCIUM SERPL-MCNC: 8.9 MG/DL
CALCIUM SERPL-MCNC: 9.2 MG/DL
CHLORIDE SERPL-SCNC: 90 MMOL/L
CHLORIDE SERPL-SCNC: 92 MMOL/L
CO2 SERPL-SCNC: 22 MMOL/L
CO2 SERPL-SCNC: 24 MMOL/L
CREAT SERPL-MCNC: 1.4 MG/DL
CREAT SERPL-MCNC: 1.4 MG/DL
DIFFERENTIAL METHOD: ABNORMAL
EOSINOPHIL # BLD AUTO: 0.6 K/UL
EOSINOPHIL NFR BLD: 4.2 %
ERYTHROCYTE [DISTWIDTH] IN BLOOD BY AUTOMATED COUNT: 14.7 %
EST. GFR  (AFRICAN AMERICAN): 55 ML/MIN/1.73 M^2
EST. GFR  (AFRICAN AMERICAN): 55 ML/MIN/1.73 M^2
EST. GFR  (NON AFRICAN AMERICAN): 48 ML/MIN/1.73 M^2
EST. GFR  (NON AFRICAN AMERICAN): 48 ML/MIN/1.73 M^2
GLUCOSE SERPL-MCNC: 134 MG/DL
GLUCOSE SERPL-MCNC: 189 MG/DL
HCT VFR BLD AUTO: 29.5 %
HGB BLD-MCNC: 9.6 G/DL
LYMPHOCYTES # BLD AUTO: 2.1 K/UL
LYMPHOCYTES NFR BLD: 14.1 %
MCH RBC QN AUTO: 26.3 PG
MCHC RBC AUTO-ENTMCNC: 32.5 G/DL
MCV RBC AUTO: 81 FL
MONOCYTES # BLD AUTO: 1.9 K/UL
MONOCYTES NFR BLD: 13.2 %
NEUTROPHILS # BLD AUTO: 9.8 K/UL
NEUTROPHILS NFR BLD: 66.7 %
PLATELET # BLD AUTO: 356 K/UL
PMV BLD AUTO: 9.9 FL
POTASSIUM SERPL-SCNC: 4.6 MMOL/L
POTASSIUM SERPL-SCNC: 4.8 MMOL/L
RBC # BLD AUTO: 3.65 M/UL
SODIUM SERPL-SCNC: 120 MMOL/L
SODIUM SERPL-SCNC: 120 MMOL/L
SODIUM SERPL-SCNC: 122 MMOL/L
SODIUM UR-SCNC: 60 MMOL/L
VANCOMYCIN TROUGH SERPL-MCNC: 26.5 UG/ML
WBC # BLD AUTO: 14.6 K/UL

## 2017-07-20 PROCEDURE — 97110 THERAPEUTIC EXERCISES: CPT

## 2017-07-20 PROCEDURE — 25000003 PHARM REV CODE 250: Performed by: HOSPITALIST

## 2017-07-20 PROCEDURE — 80048 BASIC METABOLIC PNL TOTAL CA: CPT | Mod: 91

## 2017-07-20 PROCEDURE — 97116 GAIT TRAINING THERAPY: CPT

## 2017-07-20 PROCEDURE — 12000002 HC ACUTE/MED SURGE SEMI-PRIVATE ROOM

## 2017-07-20 PROCEDURE — 63600175 PHARM REV CODE 636 W HCPCS: Performed by: HOSPITALIST

## 2017-07-20 PROCEDURE — 25000003 PHARM REV CODE 250: Performed by: NURSE PRACTITIONER

## 2017-07-20 PROCEDURE — 85025 COMPLETE CBC W/AUTO DIFF WBC: CPT

## 2017-07-20 PROCEDURE — 36415 COLL VENOUS BLD VENIPUNCTURE: CPT

## 2017-07-20 PROCEDURE — 80202 ASSAY OF VANCOMYCIN: CPT

## 2017-07-20 PROCEDURE — 84295 ASSAY OF SERUM SODIUM: CPT

## 2017-07-20 PROCEDURE — 84300 ASSAY OF URINE SODIUM: CPT

## 2017-07-20 RX ORDER — SODIUM CHLORIDE 0.9 % (FLUSH) 0.9 %
10 SYRINGE (ML) INJECTION EVERY 6 HOURS
Status: DISCONTINUED | OUTPATIENT
Start: 2017-07-20 | End: 2017-07-25 | Stop reason: HOSPADM

## 2017-07-20 RX ORDER — SODIUM CHLORIDE 9 MG/ML
INJECTION, SOLUTION INTRAVENOUS CONTINUOUS
Status: DISCONTINUED | OUTPATIENT
Start: 2017-07-20 | End: 2017-07-21

## 2017-07-20 RX ADMIN — Medication 10 ML: at 05:07

## 2017-07-20 RX ADMIN — ONDANSETRON 8 MG: 8 TABLET, ORALLY DISINTEGRATING ORAL at 08:07

## 2017-07-20 RX ADMIN — POLYETHYLENE GLYCOL 3350 17 G: 17 POWDER, FOR SOLUTION ORAL at 08:07

## 2017-07-20 RX ADMIN — LEVOTHYROXINE SODIUM 75 MCG: 75 TABLET ORAL at 06:07

## 2017-07-20 RX ADMIN — VANCOMYCIN HYDROCHLORIDE 1250 MG: 1 INJECTION, POWDER, LYOPHILIZED, FOR SOLUTION INTRAVENOUS at 03:07

## 2017-07-20 RX ADMIN — ATORVASTATIN CALCIUM 10 MG: 10 TABLET, FILM COATED ORAL at 10:07

## 2017-07-20 RX ADMIN — HEPARIN SODIUM 5000 UNITS: 5000 INJECTION, SOLUTION INTRAVENOUS; SUBCUTANEOUS at 01:07

## 2017-07-20 RX ADMIN — ASPIRIN 81 MG: 81 TABLET, COATED ORAL at 10:07

## 2017-07-20 RX ADMIN — ONDANSETRON 8 MG: 8 TABLET, ORALLY DISINTEGRATING ORAL at 09:07

## 2017-07-20 RX ADMIN — RAMELTEON 8 MG: 8 TABLET, FILM COATED ORAL at 08:07

## 2017-07-20 RX ADMIN — HEPARIN SODIUM 5000 UNITS: 5000 INJECTION, SOLUTION INTRAVENOUS; SUBCUTANEOUS at 06:07

## 2017-07-20 RX ADMIN — Medication 10 ML: at 01:07

## 2017-07-20 RX ADMIN — TAMSULOSIN HYDROCHLORIDE 0.4 MG: 0.4 CAPSULE ORAL at 10:07

## 2017-07-20 RX ADMIN — SODIUM CHLORIDE: 0.9 INJECTION, SOLUTION INTRAVENOUS at 06:07

## 2017-07-20 RX ADMIN — HEPARIN SODIUM 5000 UNITS: 5000 INJECTION, SOLUTION INTRAVENOUS; SUBCUTANEOUS at 09:07

## 2017-07-20 RX ADMIN — PANTOPRAZOLE SODIUM 40 MG: 40 TABLET, DELAYED RELEASE ORAL at 10:07

## 2017-07-20 NOTE — PT/OT/SLP PROGRESS
"Physical Therapy  Treatment    Rolo Blanca   MRN: [id number]   Admitting Diagnosis: Endocarditis of mitral valve    PT Received On: 07/20/17  PT Start Time: 1416     PT Stop Time: 1440    PT Total Time (min): 24 min       Billable Minutes:  Gait [id number] and Therapeutic Exercise 12    Treatment Type: Treatment  PT/PTA: PTA     PTA Visit Number: 2       General Precautions: Standard, fall, contact   Orthopedic Precautions: N/A   Braces: N/A         Subjective:  Communicated with nurse Hunter prior to session.  Pt agreeable to therapy. Pt's family present.   Pt stated " I am weak, I didn't sleep for the last 3 nights "   Pain/Comfort  Pain Rating 1: 0/10  Pain Rating Post-Intervention 1: 0/10    Objective:   Patient found with: telemetry    Functional Mobility:  Bed Mobility:    Scooting/Bridging: Stand by Assistance  Sit to Supine: Supervision    Transfers: from bed x 4 trials. V/T cues for safety technique and walker management.   Sit <> Stand Assistance: Stand By Assistance  Sit <> Stand Assistive Device: No Assistive Device, Rolling Walker    Gait:   Gait Distance: 120 ft. V/T cues for safety technique and walker management. Pt with unsteady gait and minimum sway during gait training. pt required Min A for safety and  walker management.   Assistance 1: Minimum assistance  Gait Assistive Device: Rolling walker  Gait Pattern: reciprocal  Gait Deviation(s): decreased radhika, increased time in double stance, decreased velocity of limb motion, decreased step length, decreased swing-to-stance ratio    Balance:   Static Sit: GOOD-: Takes MODERATE challenges from all directions but inconsistently  Dynamic Sit: FAIR+: Maintains balance through MINIMAL excursions of active trunk motion  Static Stand: FAIR: Maintains without assist but unable to take challenges  Dynamic stand: FAIR: Needs CONTACT GUARD during gait     Therapeutic Activities and Exercises:  Pt performed bed mobility, transfer and gait training as " above.  Educated and demonstrated to pt and pt's son safety technique with transfer and gait training using RW.    Pt performed seated BLE x 15 reps: heel/toes raises, LAQ, HS, hip flexion and pillow squeezes.  V/T cues for proper technique and sequence.   AM-PAC 6 CLICK MOBILITY  How much help from another person does this patient currently need?   1 = Unable, Total/Dependent Assistance  2 = A lot, Maximum/Moderate Assistance  3 = A little, Minimum/Contact Guard/Supervision  4 = None, Modified [place]/Independent    Turning over in bed (including adjusting bedclothes, sheets and blankets)?: 4  Sitting down on and standing up from a chair with arms (e.g., wheelchair, bedside commode, etc.): 4  Moving from lying on back to sitting on the side of the bed?: 4  Moving to and from a bed to a chair (including a wheelchair)?: 3  Need to walk in hospital room?: 3  Climbing 3-5 steps with a railing?: 3  Total Score: 21    AM-PAC Raw Score CMS G-Code Modifier Level of Impairment Assistance   6 % Total / Unable   7 - 9 CM 80 - 100% Maximal Assist   10 - 14 CL 60 - 80% Moderate Assist   15 - 19 CK 40 - 60% Moderate Assist   20 - 22 CJ 20 - 40% Minimal Assist   23 CI 1-20% SBA / CGA   24 CH 0% Independent/ Mod I     Patient left HOB elevated with all lines intact, call button in reach, bed alarm on, nurse Elsa notified and son and spouse present.    Assessment:  Rolo Blanca is a 77 y.o. male with a medical diagnosis of Endocarditis of mitral valve and presents with weakness, decreased endurance and functional mobility. Pt required constant V/T 's for safety awareness with all OOB mobility, transfer and gait training. Pt will benefit from further skilled therapy in order to get back to PLOF.     Rehab identified problem list/impairments: Rehab identified problem list/impairments: weakness, gait instability, impaired cognition, decreased safety awareness, decreased coordination, decreased upper extremity  function, decreased lower extremity function, decreased ROM    Rehab potential is good.    Activity tolerance: Fair    Discharge recommendations:   Discharge Facility/Level Of Care Needs: home ( with family assistance )     Barriers to discharge:  none     Equipment recommendations:   none     GOALS:    Physical Therapy Goals        Problem: Physical Therapy Goal    Goal Priority Disciplines Outcome Goal Variances Interventions   Physical Therapy Goal     PT/OT, PT Ongoing (interventions implemented as appropriate)     Description:  Goals to be met by: 17    Patient will increase functional independence with mobility by performin. Sit to stand transfer with Modified [place]  2. Gait  x 250 feet with Modified [place]   3. Ascend/descend 4 stair with bilateral Handrails Modified [place]   4. Lower extremity exercise program x30 reps per handout, with independence                      PLAN:    Patient to be seen 5 x/week  to address the above listed problems via gait training, therapeutic activities, therapeutic exercises  Plan of Care expires: 17  Plan of Care reviewed with: patient         Mary Kate Jimenez, PTA  2017

## 2017-07-20 NOTE — CONSULTS
Consult Note  Infectious Disease    Consult Requested By: Meeta Swan MD    Reason for Consult: staph aureus sepsis and strep viridans sepsis    SUBJECTIVE:     History of Present Illness:  Patient is a 77 y.o. male presents with  Abdominal pain. He was seen in ed on 7/11/17 with bilateral lower quadrant pain and was diagnosed with acute diverticulitis when a ct abdomen showed mild sigmoid stranding. He was rx with cipro and flagyl and did not improve. He now returns with decreasing abdominal pain and fatigue. He denies fever, sobar or chest pains. 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 He developed mitral regurgitation and several episodes of chf.he went home with hospice after he declined operative intervention. He sought a second opinion at [facility] and was declined as a surgical candidate. Repeat blood cultures on 12/8/15,6/28/16,10/3/16 were all negative. He was comitted to 6 weeks of iv rocephin and stopped iv abx 1/22/16. He was then placed on po keflex 500 mg q 12 hours and apparently has continued that modality but has missed the occasional dose. He was last seen by an id np at [facility] in October 2016. His tte on 12/19/15 and 1/25/16 continued to show a small mitral valve mass with mr- this was felt to have been sterilised and no surgery was undertaken.  His blood cultures form 7/11/17 now are positive for a staph aureus and a strep viridans. His repeat blood cultures taken while on antibiotics are negative form 7/13/17.    Past Medical History:   Diagnosis Date    MI, old 2014     Past Surgical History:   Procedure Laterality Date    AORTIC VALVE REPLACEMENT  2014    CARDIAC SURGERY      CARDIAC VALVE SURGERY      Bio AVR 2014    COLON SURGERY  2000    CORONARY ARTERY BYPASS GRAFT  2014    x2    TONSILLECTOMY       History reviewed. No pertinent family  history.  Social History   Substance Use Topics    Smoking status: Never Smoker    Smokeless tobacco: Never Used    Alcohol use Yes      Comment: 2-3 X'S A WEEK       Review of patient's allergies indicates:  No Known Allergies     Antibiotics     Start     Stop Route Frequency Ordered    07/16/17 1500  vancomycin (VANCOCIN) 1,250 mg in dextrose 5 % 250 mL IVPB      -- IV Every 12 hours (non-standard times) 07/16/17 1400          Review of Systems:  Constitutional: no fever or chills  Eyes: no visual changes  ENT: no nasal congestion or sore throat  Respiratory: no cough or shortness of breath  Cardiovascular: no chest pain or palpitations  Gastrointestinal: positive for abdominal pain  Genitourinary: no hematuria or dysuria  Hematologic/Lymphatic: no easy bruising or lymphadenopathy  Musculoskeletal: no arthralgias or myalgias  Neurological: no seizures or tremors    OBJECTIVE:     Vital Signs (Most Recent)  Temp: 97.7 °F (36.5 °C) (07/20/17 0817)  Pulse: 81 (07/20/17 0817)  Resp: 17 (07/20/17 0817)  BP: 135/72 (07/20/17 0817)  SpO2: 95 % (07/20/17 0817)    Temperature Range Min/Max (Last 24H):  Temp:  [97.7 °F (36.5 °C)-98 °F (36.7 °C)]     Physical Exam:  General: well developed, well nourished  HENT: Head:normocephalic, atraumatic. Ears:not examined. Nose: Nares normal. Septum midline. Mucosa normal. No drainage or sinus tenderness., no discharge. Throat: lips, mucosa, and tongue normal; teeth and gums normal and no throat erythema.  Eyes: conjunctivae/corneas clear. PERRL.   Neck: supple, symmetrical, trachea midline, no JVD and thyroid not enlarged, symmetric, no tenderness/mass/nodules  Lungs:  clear to auscultation bilaterally and normal respiratory effort  Cardiovascular: Heart: loud psm lse 4/6. Chest Wall: no tenderness. Extremities: no cyanosis or edema, or clubbing. Pulses: 2+ and symmetric.  Abdomen/Rectal: Abdomen: soft, non-tender non-distented; bowel sounds normal; no masses,  no organomegaly.  Rectal: not examined  Skin: Skin color, texture, turgor normal. No rashes or lesions  Musculoskeletal:no clubbing, cyanosis  Lymph Nodes: No cervical or supraclavicular adenopathy    Laboratory:  CBC    Recent Labs  Lab 07/20/17  0345   WBC 14.60*   RBC 3.65*   HGB 9.6*   HCT 29.5*   *     BMP    Recent Labs  Lab 07/20/17  0345   CO2 24   BUN 28*   CREATININE 1.4   CALCIUM 8.9     No results for input(s): COLORU, CLARITYU, SPECGRAV, PHUR, PROTEINUA, GLUCOSEU, BILIRUBINCON, BLOODU, WBCU, RBCU, BACTERIA, MUCUS, NITRITE, LEUKOCYTESUR, UROBILINOGEN, HYALINECASTS in the last 168 hours.  Microbiology Results (last 7 days)     ** No results found for the last 168 hours. **          Diagnostic Results:  Labs: Reviewed  X-Ray: Reviewed  Echo: Reviewed    ASSESSMENT/PLAN:     Active Hospital Problems    Diagnosis  POA    *Endocarditis of mitral valve [I05.8]  Yes    Hyponatremia [E87.1]  Yes    Hypotension due to drugs [I95.2]  No    Urinary retention [R33.9]  Yes    Bacteremia due to Staphylococcus aureus [R78.81]  Yes    Diverticulitis of large intestine without bleeding [K57.32]  Yes    Depression [F32.9]  Yes    Chronic kidney disease, stage III (moderate) [N18.3]  Yes    Hypothyroidism [E03.9]  Yes    History of colon cancer [Z85.038]  Yes    Hyperlipidemia [E78.5]  Yes    S/P CABG (coronary artery bypass graft) [Z95.1]  Not Applicable    S/P AVR [Z95.2]  Not Applicable      Resolved Hospital Problems    Diagnosis Date Resolved POA    Positive blood cultures [R78.81] 07/18/2017 Yes       1. Staph aureus(mrsa) and strep salivarius in blood. He has a prosthetic aortic valve and has had ? Partially sterilised vegetation on mitral valve since 2015    He is apparently not a surgical candidate  He has not had staph aureus in blood before  Suggest   Sai  Repeat ct abdomen and pelvis to ensure no abscess etc- looks better  Iv rocephin and vancomycin pending sensitivities  Will likely need 6 weeks of iv abx  again, no guarantee of success   I cannot rule out contaminated cultures and have to rx as with prosthetic aortic valve and mitral vegetation  KHALIDA DECLINED BY PONCE  HE IS FULLY AWARE THAT A TTE DOES NOT VISUALISE THE PROSTHETIC VALVE WELL  HE IS AWARE THAT A PROSTHETIC VALVE ENDOCARDITIS HAS GRAVE CONNOTATIONS OF DEATH AND FAILURE OF VALVE  He still declines procedure and opts for 6 weeks of iv abx  He continues with his odd behaviour and changes his mind constantly  Now tells me he cannot eat  I urged that he hydrate  Creatinine is 1.5, will recheck vanco trough and bmp in am

## 2017-07-20 NOTE — PROGRESS NOTES
Spoke with  earlier re: vanco trough results & aware of med.held. Spoke with  at present re:vanco trough drawn earlier & med held @ 3pm with new orders noted.

## 2017-07-20 NOTE — PLAN OF CARE
Problem: Physical Therapy Goal  Goal: Physical Therapy Goal  Goals to be met by: 17    Patient will increase functional independence with mobility by performin. Sit to stand transfer with Modified [place]  2. Gait  x 250 feet with Modified [place]   3. Ascend/descend 4 stair with bilateral Handrails Modified [place]   4. Lower extremity exercise program x30 reps per handout, with independence     Outcome: Ongoing (interventions implemented as appropriate)  Pt will benefit from further therapy in order to get back to PLOF.

## 2017-07-20 NOTE — PLAN OF CARE
Problem: Patient Care Overview  Goal: Plan of Care Review  Outcome: Ongoing (interventions implemented as appropriate)  Patient AAOX4 Pt had no complaints of pain.   Patient skin intact; IV site cdi intact infusing antibiotics successfully.  Patient had no  c/o nausea.  Hourly rounding conducted to ensure safety and assist with personal care needs.  Ambulatory to bathroom without assist, tolerating diet in small portions.  Will continue to follow.

## 2017-07-21 LAB
ANION GAP SERPL CALC-SCNC: 5 MMOL/L
ANION GAP SERPL CALC-SCNC: 6 MMOL/L
ANION GAP SERPL CALC-SCNC: 7 MMOL/L
ANION GAP SERPL CALC-SCNC: 7 MMOL/L
BASOPHILS # BLD AUTO: 0.04 K/UL
BASOPHILS NFR BLD: 0.3 %
BUN SERPL-MCNC: 18 MG/DL
BUN SERPL-MCNC: 22 MG/DL
BUN SERPL-MCNC: 22 MG/DL
CALCIUM SERPL-MCNC: 8.5 MG/DL
CALCIUM SERPL-MCNC: 8.5 MG/DL
CALCIUM SERPL-MCNC: 8.6 MG/DL
CALCIUM SERPL-MCNC: 8.7 MG/DL
CALCIUM SERPL-MCNC: 8.7 MG/DL
CALCIUM SERPL-MCNC: 8.9 MG/DL
CHLORIDE SERPL-SCNC: 90 MMOL/L
CHLORIDE SERPL-SCNC: 91 MMOL/L
CO2 SERPL-SCNC: 22 MMOL/L
CO2 SERPL-SCNC: 23 MMOL/L
CO2 SERPL-SCNC: 24 MMOL/L
CO2 SERPL-SCNC: 24 MMOL/L
CREAT SERPL-MCNC: 1.1 MG/DL
CREAT SERPL-MCNC: 1.2 MG/DL
CREAT SERPL-MCNC: 1.2 MG/DL
DIFFERENTIAL METHOD: ABNORMAL
EOSINOPHIL # BLD AUTO: 0.6 K/UL
EOSINOPHIL NFR BLD: 3.6 %
ERYTHROCYTE [DISTWIDTH] IN BLOOD BY AUTOMATED COUNT: 14.8 %
EST. GFR  (AFRICAN AMERICAN): >60 ML/MIN/1.73 M^2
EST. GFR  (NON AFRICAN AMERICAN): 58 ML/MIN/1.73 M^2
EST. GFR  (NON AFRICAN AMERICAN): 58 ML/MIN/1.73 M^2
EST. GFR  (NON AFRICAN AMERICAN): >60 ML/MIN/1.73 M^2
GLUCOSE SERPL-MCNC: 129 MG/DL
GLUCOSE SERPL-MCNC: 129 MG/DL
GLUCOSE SERPL-MCNC: 167 MG/DL
GLUCOSE SERPL-MCNC: 171 MG/DL
GLUCOSE SERPL-MCNC: 172 MG/DL
GLUCOSE SERPL-MCNC: 195 MG/DL
HCT VFR BLD AUTO: 29.7 %
HGB BLD-MCNC: 9.7 G/DL
LYMPHOCYTES # BLD AUTO: 2.3 K/UL
LYMPHOCYTES NFR BLD: 15.1 %
MCH RBC QN AUTO: 26.5 PG
MCHC RBC AUTO-ENTMCNC: 32.7 G/DL
MCV RBC AUTO: 81 FL
MONOCYTES # BLD AUTO: 1.5 K/UL
MONOCYTES NFR BLD: 9.6 %
NEUTROPHILS # BLD AUTO: 10.9 K/UL
NEUTROPHILS NFR BLD: 71.4 %
OSMOLALITY SERPL: 261 MOSM/KG
OSMOLALITY UR: 434 MOSM/KG
PLATELET # BLD AUTO: 354 K/UL
PMV BLD AUTO: 10.1 FL
POTASSIUM SERPL-SCNC: 4.4 MMOL/L
POTASSIUM SERPL-SCNC: 4.4 MMOL/L
POTASSIUM SERPL-SCNC: 4.7 MMOL/L
POTASSIUM SERPL-SCNC: 4.8 MMOL/L
RBC # BLD AUTO: 3.66 M/UL
SODIUM SERPL-SCNC: 117 MMOL/L
SODIUM SERPL-SCNC: 119 MMOL/L
SODIUM SERPL-SCNC: 119 MMOL/L
SODIUM SERPL-SCNC: 120 MMOL/L
SODIUM SERPL-SCNC: 121 MMOL/L
SODIUM SERPL-SCNC: 121 MMOL/L
WBC # BLD AUTO: 15.27 K/UL

## 2017-07-21 PROCEDURE — G8980 MOBILITY D/C STATUS: HCPCS | Mod: CJ

## 2017-07-21 PROCEDURE — 85025 COMPLETE CBC W/AUTO DIFF WBC: CPT

## 2017-07-21 PROCEDURE — 25000003 PHARM REV CODE 250: Performed by: HOSPITALIST

## 2017-07-21 PROCEDURE — 63600175 PHARM REV CODE 636 W HCPCS: Performed by: INTERNAL MEDICINE

## 2017-07-21 PROCEDURE — G8979 MOBILITY GOAL STATUS: HCPCS | Mod: CI

## 2017-07-21 PROCEDURE — 36415 COLL VENOUS BLD VENIPUNCTURE: CPT

## 2017-07-21 PROCEDURE — 12000002 HC ACUTE/MED SURGE SEMI-PRIVATE ROOM

## 2017-07-21 PROCEDURE — 83935 ASSAY OF URINE OSMOLALITY: CPT

## 2017-07-21 PROCEDURE — 83930 ASSAY OF BLOOD OSMOLALITY: CPT

## 2017-07-21 PROCEDURE — 25000003 PHARM REV CODE 250: Performed by: NURSE PRACTITIONER

## 2017-07-21 PROCEDURE — G8978 MOBILITY CURRENT STATUS: HCPCS | Mod: CJ

## 2017-07-21 PROCEDURE — 82533 TOTAL CORTISOL: CPT

## 2017-07-21 PROCEDURE — 80048 BASIC METABOLIC PNL TOTAL CA: CPT

## 2017-07-21 PROCEDURE — 25000003 PHARM REV CODE 250: Performed by: INTERNAL MEDICINE

## 2017-07-21 PROCEDURE — 97116 GAIT TRAINING THERAPY: CPT

## 2017-07-21 RX ORDER — LORAZEPAM 2 MG/ML
1 INJECTION INTRAMUSCULAR
Status: DISCONTINUED | OUTPATIENT
Start: 2017-07-21 | End: 2017-07-25 | Stop reason: HOSPADM

## 2017-07-21 RX ORDER — COSYNTROPIN 0.25 MG/ML
0.25 INJECTION, POWDER, FOR SOLUTION INTRAMUSCULAR; INTRAVENOUS ONCE
Status: COMPLETED | OUTPATIENT
Start: 2017-07-22 | End: 2017-07-22

## 2017-07-21 RX ORDER — SODIUM CHLORIDE 9 MG/ML
INJECTION, SOLUTION INTRAVENOUS ONCE
Status: COMPLETED | OUTPATIENT
Start: 2017-07-21 | End: 2017-07-21

## 2017-07-21 RX ORDER — SIMETHICONE 80 MG
1 TABLET,CHEWABLE ORAL 3 TIMES DAILY PRN
Status: DISCONTINUED | OUTPATIENT
Start: 2017-07-21 | End: 2017-07-25 | Stop reason: HOSPADM

## 2017-07-21 RX ADMIN — SODIUM CHLORIDE: 0.9 INJECTION, SOLUTION INTRAVENOUS at 09:07

## 2017-07-21 RX ADMIN — Medication 10 ML: at 09:07

## 2017-07-21 RX ADMIN — VANCOMYCIN HYDROCHLORIDE 1250 MG: 1 INJECTION, POWDER, LYOPHILIZED, FOR SOLUTION INTRAVENOUS at 03:07

## 2017-07-21 RX ADMIN — HEPARIN SODIUM 5000 UNITS: 5000 INJECTION, SOLUTION INTRAVENOUS; SUBCUTANEOUS at 06:07

## 2017-07-21 RX ADMIN — LEVOTHYROXINE SODIUM 75 MCG: 75 TABLET ORAL at 06:07

## 2017-07-21 RX ADMIN — SODIUM CHLORIDE: 0.9 INJECTION, SOLUTION INTRAVENOUS at 10:07

## 2017-07-21 RX ADMIN — HEPARIN SODIUM 5000 UNITS: 5000 INJECTION, SOLUTION INTRAVENOUS; SUBCUTANEOUS at 09:07

## 2017-07-21 RX ADMIN — TAMSULOSIN HYDROCHLORIDE 0.4 MG: 0.4 CAPSULE ORAL at 10:07

## 2017-07-21 RX ADMIN — PANTOPRAZOLE SODIUM 40 MG: 40 TABLET, DELAYED RELEASE ORAL at 10:07

## 2017-07-21 RX ADMIN — ATORVASTATIN CALCIUM 10 MG: 10 TABLET, FILM COATED ORAL at 10:07

## 2017-07-21 RX ADMIN — SERTRALINE HYDROCHLORIDE 50 MG: 50 TABLET ORAL at 10:07

## 2017-07-21 RX ADMIN — ONDANSETRON 8 MG: 8 TABLET, ORALLY DISINTEGRATING ORAL at 09:07

## 2017-07-21 RX ADMIN — Medication 10 ML: at 06:07

## 2017-07-21 RX ADMIN — POLYETHYLENE GLYCOL 3350 17 G: 17 POWDER, FOR SOLUTION ORAL at 09:07

## 2017-07-21 RX ADMIN — Medication 10 ML: at 12:07

## 2017-07-21 RX ADMIN — METOPROLOL SUCCINATE 25 MG: 25 TABLET, EXTENDED RELEASE ORAL at 10:07

## 2017-07-21 RX ADMIN — RAMELTEON 8 MG: 8 TABLET, FILM COATED ORAL at 09:07

## 2017-07-21 RX ADMIN — ASPIRIN 81 MG: 81 TABLET, COATED ORAL at 10:07

## 2017-07-21 RX ADMIN — HEPARIN SODIUM 5000 UNITS: 5000 INJECTION, SOLUTION INTRAVENOUS; SUBCUTANEOUS at 02:07

## 2017-07-21 RX ADMIN — BUPROPION HYDROCHLORIDE 150 MG: 150 TABLET, FILM COATED, EXTENDED RELEASE ORAL at 09:07

## 2017-07-21 NOTE — PROGRESS NOTES
Monitored freq.throughout the shift. Pt.resting at present with no complaints and no acute distress noted.  visited earlier & aware of labs,vitals signs & sched.meds.pt.refused with new orders noted. Remains on contact isolation. Telemetry cont in use. Report oncoming shift.

## 2017-07-21 NOTE — CONSULTS
Consult Note  Infectious Disease    Consult Requested By: Meeta Swan MD    Reason for Consult: staph aureus sepsis and strep viridans sepsis    SUBJECTIVE:     History of Present Illness:  Patient is a 77 y.o. male presents with  Abdominal pain. He was seen in ed on 7/11/17 with bilateral lower quadrant pain and was diagnosed with acute diverticulitis when a ct abdomen showed mild sigmoid stranding. He was rx with cipro and flagyl and did not improve. He now returns with decreasing abdominal pain and fatigue. He denies fever, sobar or chest pains. 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 He developed mitral regurgitation and several episodes of chf.he went home with hospice after he declined operative intervention. He sought a second opinion at [facility] and was declined as a surgical candidate. Repeat blood cultures on 12/8/15,6/28/16,10/3/16 were all negative. He was comitted to 6 weeks of iv rocephin and stopped iv abx 1/22/16. He was then placed on po keflex 500 mg q 12 hours and apparently has continued that modality but has missed the occasional dose. He was last seen by an id np at [facility] in October 2016. His tte on 12/19/15 and 1/25/16 continued to show a small mitral valve mass with mr- this was felt to have been sterilised and no surgery was undertaken.  His blood cultures form 7/11/17 now are positive for a staph aureus and a strep viridans. His repeat blood cultures taken while on antibiotics are negative form 7/13/17.    Past Medical History:   Diagnosis Date    MI, old 2014     Past Surgical History:   Procedure Laterality Date    AORTIC VALVE REPLACEMENT  2014    CARDIAC SURGERY      CARDIAC VALVE SURGERY      Bio AVR 2014    COLON SURGERY  2000    CORONARY ARTERY BYPASS GRAFT  2014    x2    TONSILLECTOMY       History reviewed. No pertinent family  history.  Social History   Substance Use Topics    Smoking status: Never Smoker    Smokeless tobacco: Never Used    Alcohol use Yes      Comment: 2-3 X'S A WEEK       Review of patient's allergies indicates:  No Known Allergies     Antibiotics     Start     Stop Route Frequency Ordered    07/21/17 0300  vancomycin (VANCOCIN) 1,250 mg in dextrose 5 % 250 mL IVPB      -- IV Every 24 hours (non-standard times) 07/20/17 1816          Review of Systems:  Constitutional: no fever or chills  Eyes: no visual changes  ENT: no nasal congestion or sore throat  Respiratory: no cough or shortness of breath  Cardiovascular: no chest pain or palpitations  Gastrointestinal: positive for abdominal pain  Genitourinary: no hematuria or dysuria  Hematologic/Lymphatic: no easy bruising or lymphadenopathy  Musculoskeletal: no arthralgias or myalgias  Neurological: no seizures or tremors    OBJECTIVE:     Vital Signs (Most Recent)  Temp: 97.8 °F (36.6 °C) (07/21/17 1024)  Pulse: 100 (07/21/17 1024)  Resp: 18 (07/21/17 1024)  BP: (!) 154/80 (07/21/17 1026)  SpO2: 96 % (07/21/17 1024)    Temperature Range Min/Max (Last 24H):  Temp:  [97.6 °F (36.4 °C)-98.3 °F (36.8 °C)]     Physical Exam:  General: well developed, well nourished  HENT: Head:normocephalic, atraumatic. Ears:not examined. Nose: Nares normal. Septum midline. Mucosa normal. No drainage or sinus tenderness., no discharge. Throat: lips, mucosa, and tongue normal; teeth and gums normal and no throat erythema.  Eyes: conjunctivae/corneas clear. PERRL.   Neck: supple, symmetrical, trachea midline, no JVD and thyroid not enlarged, symmetric, no tenderness/mass/nodules  Lungs:  clear to auscultation bilaterally and normal respiratory effort  Cardiovascular: Heart: loud psm lse 4/6. Chest Wall: no tenderness. Extremities: no cyanosis or edema, or clubbing. Pulses: 2+ and symmetric.  Abdomen/Rectal: Abdomen: soft, non-tender non-distented; bowel sounds normal; no masses,  no  organomegaly. Rectal: not examined  Skin: Skin color, texture, turgor normal. No rashes or lesions  Musculoskeletal:no clubbing, cyanosis  Lymph Nodes: No cervical or supraclavicular adenopathy    Laboratory:  CBC    Recent Labs  Lab 07/21/17  0414   WBC 15.27*   RBC 3.66*   HGB 9.7*   HCT 29.7*   *     BMP    Recent Labs  Lab 07/21/17  1133   CO2 22*   BUN 18   CREATININE 1.1   CALCIUM 8.5*     No results for input(s): COLORU, CLARITYU, SPECGRAV, PHUR, PROTEINUA, GLUCOSEU, BILIRUBINCON, BLOODU, WBCU, RBCU, BACTERIA, MUCUS, NITRITE, LEUKOCYTESUR, UROBILINOGEN, HYALINECASTS in the last 168 hours.  Microbiology Results (last 7 days)     ** No results found for the last 168 hours. **          Diagnostic Results:  Labs: Reviewed  X-Ray: Reviewed  Echo: Reviewed    ASSESSMENT/PLAN:     Active Hospital Problems    Diagnosis  POA    *Endocarditis of mitral valve [I05.8]  Yes    Hyponatremia [E87.1]  Yes    Hypotension due to drugs [I95.2]  No    Urinary retention [R33.9]  Yes    Bacteremia due to Staphylococcus aureus [R78.81]  Yes    Diverticulitis of large intestine without bleeding [K57.32]  Yes    Depression [F32.9]  Yes    Chronic kidney disease, stage III (moderate) [N18.3]  Yes    Hypothyroidism [E03.9]  Yes    History of colon cancer [Z85.038]  Yes    Hyperlipidemia [E78.5]  Yes    S/P CABG (coronary artery bypass graft) [Z95.1]  Not Applicable    S/P AVR [Z95.2]  Not Applicable      Resolved Hospital Problems    Diagnosis Date Resolved POA    Positive blood cultures [R78.81] 07/18/2017 Yes       1. Staph aureus(mrsa) and strep salivarius in blood. He has a prosthetic aortic valve and has had ? Partially sterilised vegetation on mitral valve since 2015    He is apparently not a surgical candidate  He has not had staph aureus in blood before  Suggest   Sai  Repeat ct abdomen and pelvis to ensure no abscess etc- looks better  Iv rocephin and vancomycin pending sensitivities  Will likely need 6  weeks of iv abx again, no guarantee of success   I cannot rule out contaminated cultures and have to rx as with prosthetic aortic valve and mitral vegetation  KHALIDA DECLINED BY ERIKNET  HE IS FULLY AWARE THAT A TTE DOES NOT VISUALISE THE PROSTHETIC VALVE WELL  HE IS AWARE THAT A PROSTHETIC VALVE ENDOCARDITIS HAS GRAVE CONNOTATIONS OF DEATH AND FAILURE OF VALVE  He still declines procedure and opts for 6 weeks of iv abx  He continues with his odd behaviour and changes his mind constantly  Now tells me he cannot eat  I urged that he hydrate  Creatinine is 1.1 and vanco now 1.25 gm q 24 hours  Son states he has identified 2 care givers who will give iv abx at home  Hyponatremia still being worked on

## 2017-07-21 NOTE — PROGRESS NOTES
visited with new orders noted. MRI called to get info on pt. Re: valve. Pt.states has pig valve.MRI ? If pt.has metal valve. Requesting card for valve for model # to clear pt.for MRI ordered. Pt.states in wallet at home & may not be able to get info until tomorrow. Call placed to  to notify of situation.

## 2017-07-21 NOTE — PLAN OF CARE
Problem: Physical Therapy Goal  Goal: Physical Therapy Goal  Goals to be met by: 17    Patient will increase functional independence with mobility by performin. Sit to stand transfer with Modified [place]  2. Gait  x 250 feet with Modified [place]   3. Ascend/descend 4 stair with bilateral Handrails Modified [place]   4. Lower extremity exercise program x30 reps per handout, with independence     Outcome: Ongoing (interventions implemented as appropriate)  Pt will benefit from further therapy in order to get back to PLOF.

## 2017-07-21 NOTE — SUBJECTIVE & OBJECTIVE
Interval History:pt I better spirits today but still gets confused, expressed my concerns to son and they will continue with care at home     Review of Systems   Constitutional: Positive for fatigue. Negative for appetite change and chills.   HENT: Negative for congestion, ear discharge and rhinorrhea.    Eyes: Negative for pain, redness and itching.   Respiratory: Negative for cough, chest tightness, shortness of breath, wheezing and stridor.    Cardiovascular: Negative for chest pain and leg swelling.   Gastrointestinal: Negative for abdominal pain, constipation, diarrhea, nausea and vomiting.   Endocrine: Negative for cold intolerance and polydipsia.   Genitourinary: Negative for dysuria and hematuria.   Musculoskeletal: Negative for arthralgias, joint swelling and myalgias.   Skin: Negative for color change and pallor.   Neurological: Negative for syncope, light-headedness and headaches.   Psychiatric/Behavioral: Positive for sleep disturbance. Negative for confusion and hallucinations. The patient is not nervous/anxious.      Objective:     Vital Signs (Most Recent):  Temp: 98.3 °F (36.8 °C) (07/20/17 1955)  Pulse: 94 (07/20/17 1955)  Resp: 17 (07/20/17 1955)  BP: (!) 142/66 (07/20/17 1955)  SpO2: 95 % (07/20/17 1955) Vital Signs (24h Range):  Temp:  [97.7 °F (36.5 °C)-98.3 °F (36.8 °C)] 98.3 °F (36.8 °C)  Pulse:  [70-94] 94  Resp:  [17-20] 17  SpO2:  [95 %-96 %] 95 %  BP: (114-142)/(56-80) 142/66     Weight: 98.9 kg (218 lb)  Body mass index is 32.66 kg/m².    Intake/Output Summary (Last 24 hours) at 07/20/17 2152  Last data filed at 07/20/17 1100   Gross per 24 hour   Intake              360 ml   Output              200 ml   Net              160 ml      Physical Exam   Constitutional: He is oriented to person, place, and time. He appears well-developed and well-nourished. No distress.   HENT:   Head: Normocephalic and atraumatic.   Right Ear: External ear normal.   Left Ear: External ear normal.   Eyes:  Conjunctivae and EOM are normal. Pupils are equal, round, and reactive to light.   Neck: Normal range of motion. Neck supple.   Cardiovascular: Normal rate and regular rhythm.    Murmur heard.   Systolic murmur is present   Pulmonary/Chest: Effort normal and breath sounds normal. He has no wheezes. He has no rales.   Abdominal: Soft. Bowel sounds are normal. There is no tenderness. There is no guarding.   Musculoskeletal: Normal range of motion.   Neurological: He is alert and oriented to person, place, and time. No cranial nerve deficit.   Skin: Skin is warm. He is not diaphoretic.   Psychiatric: He has a normal mood and affect. His behavior is normal. Judgment and thought content normal.   Vitals reviewed.      Significant Labs:   BMP:   Recent Labs  Lab 07/20/17  1925   *   *   K 4.8   CL 90*   CO2 22*   BUN 26*   CREATININE 1.4   CALCIUM 9.2     CBC:   Recent Labs  Lab 07/19/17  0500 07/20/17  0345   WBC 13.89* 14.60*   HGB 9.5* 9.6*   HCT 29.0* 29.5*    356*     POCT Glucose: No results for input(s): POCTGLUCOSE in the last 48 hours.    Significant Imaging: I have reviewed all pertinent imaging results/findings within the past 24 hours.

## 2017-07-21 NOTE — PLAN OF CARE
"Problem: Patient Care Overview  Goal: Plan of Care Review  Outcome: Ongoing (interventions implemented as appropriate)  Patient AAOX3. Pt had no complaints of pain.   Patient skin intact; IV site cdi intact infusing antibiotics successfully.  Patient had 1 episode of nausea; medicated with po med and tolerated well. Urine collected and sent to lab. Patient has several episodes of forgetfulness; "stated he needed to ask me a question but forget".   Hourly rounding conducted to ensure safety and assist with personal care needs.  Ambulatory to bathroom without assist, tolerating diet in small portions.  Will continue to follow.           "

## 2017-07-21 NOTE — PLAN OF CARE
07/21/17 1532   Discharge Reassessment   Assessment Type Discharge Planning Reassessment   DC plan remains the same:  Home with HH and IV ABX.  Family friends to assist with IV ABX administration.  No DC order written yet.  Patient told TN this am that he would consent to a [facility].  This afternoon when speaking to Dr Davenport he declined.  He continues to change his mind about his care.  He has been consistent in refusing SNF.    TN notified SAURABH Martin RN; [facility] that no DC is planned for today.  Message also left for Cecil at [facility] to return call.    Call back received from Cecil at [facility].  New HH orders sent to [facility] and [facility] via [facility].

## 2017-07-21 NOTE — PT/OT/SLP PROGRESS
"Physical Therapy  Treatment    Rolo Blanca   MRN: [id number]   Admitting Diagnosis: Endocarditis of mitral valve    PT Received On: 07/21/17  PT Start Time: 1415     PT Stop Time: 1430    PT Total Time (min): 15 min       Billable Minutes:  Gait [id number]    Treatment Type: Treatment  PT/PTA: PTA     PTA Visit Number: 3       General Precautions: Standard, fall  Orthopedic Precautions: N/A   Braces: N/A         Subjective:  Communicated with nurse Hunter prior to session.  Pt agreeable therapy. " I am so weak, I didn't sleep for the last 3-4 days "     Pain/Comfort  Pain Rating 1: 0/10  Pain Rating Post-Intervention 1: 0/10    Objective:   Patient found with: telemetry    Functional Mobility:  Bed Mobility:   Rolling/Turning Right: Supervision  Scooting/Bridging: Stand by Assistance, Supervision  Supine to Sit: Stand by Assistance  Sit to Supine: Stand by Assistance    Transfers: from bed. VC's for safety technique .   Sit <> Stand Assistance: Contact Guard Assistance  Sit <> Stand Assistive Device: No Assistive Device    Gait:   Gait Distance: 100 ft . VC's for safety awareness.   Assistance 1: Contact Guard Assistance  Gait Assistive Device: Hand held assist  Gait Pattern: reciprocal  Gait Deviation(s): decreased radhika, increased time in double stance, decreased velocity of limb motion, decreased step length      Balance:   Static Sit: GOOD-: Takes MODERATE challenges from all directions but inconsistently  Dynamic Sit: GOOD-: Maintains balance through MODERATE excursions of active trunk movement,     Static Stand: FAIR: Maintains without assist but unable to take challenges  Dynamic stand: FAIR: Needs CONTACT GUARD during gait     Therapeutic Activities and Exercises:  Pt performed bed mobility, transfer and gait training as above.   Educated and encouraged supine BLE x 10 reps x 2 : AP, GS, HS, SAQ, hip ABD/ADD. Pt verbalize understand.      AM-PAC 6 CLICK MOBILITY  How much help from another person does " this patient currently need?   1 = Unable, Total/Dependent Assistance  2 = A lot, Maximum/Moderate Assistance  3 = A little, Minimum/Contact Guard/Supervision  4 = None, Modified [place]/Independent    Turning over in bed (including adjusting bedclothes, sheets and blankets)?: 4  Sitting down on and standing up from a chair with arms (e.g., wheelchair, bedside commode, etc.): 4  Moving from lying on back to sitting on the side of the bed?: 4  Moving to and from a bed to a chair (including a wheelchair)?: 4  Need to walk in hospital room?: 3  Climbing 3-5 steps with a railing?: 3  Total Score: 22    AM-PAC Raw Score CMS G-Code Modifier Level of Impairment Assistance   6 % Total / Unable   7 - 9 CM 80 - 100% Maximal Assist   10 - 14 CL 60 - 80% Moderate Assist   15 - 19 CK 40 - 60% Moderate Assist   20 - 22 CJ 20 - 40% Minimal Assist   23 CI 1-20% SBA / CGA   24 CH 0% Independent/ Mod I     Patient left HOB elevated with all lines intact, call button in reach, nurse Elsa notified ( pt requested for strawberry boots flavor instead of vanilla )   and spouse present.    Assessment:  Rolo Blanca is a 77 y.o. male with a medical diagnosis of Endocarditis of mitral valve and presents with weakness, decreased endurance and functional mobility. Pt will benefit from further therapy .    Rehab identified problem list/impairments: Rehab identified problem list/impairments: weakness, decreased lower extremity function, impaired cognition, decreased coordination, decreased safety awareness, decreased ROM, decreased upper extremity function    Rehab potential is good.    Activity tolerance: Fair    Discharge recommendations:   Discharge Facility/Level Of Care Needs: home ( with family assistance )     Barriers to discharge:  none     Equipment recommendations:   none    GOALS:    Physical Therapy Goals        Problem: Physical Therapy Goal    Goal Priority Disciplines Outcome Goal Variances Interventions    Physical Therapy Goal     PT/OT, PT Ongoing (interventions implemented as appropriate)     Description:  Goals to be met by: 17    Patient will increase functional independence with mobility by performin. Sit to stand transfer with Modified [place]  2. Gait  x 250 feet with Modified [place]   3. Ascend/descend 4 stair with bilateral Handrails Modified [place]   4. Lower extremity exercise program x30 reps per handout, with independence                      PLAN:    Patient to be seen 5 x/week  to address the above listed problems via gait training, therapeutic activities, therapeutic exercises  Plan of Care expires: 17  Plan of Care reviewed with: patient         Mary Kate Jimenez, PTA  2017

## 2017-07-21 NOTE — PROGRESS NOTES
[facility] Hospital Medicine  Progress Note    Patient Name: Rolo Blanca  MRN: [id number]  Patient Class: IP- Inpatient   Admission Date: 7/13/2017  Length of Stay: 7 days  Attending Physician: Meeta Swan MD  Primary Care Provider: Scott Valadez MD        Subjective:     Principal Problem:Endocarditis of mitral valve    HPI:  The patient is a 78 y/o male with a significant history of colon cancer 2000 sp partial colon resection, hypertension, hyperlipidemia, TIAs, CAD s/p MI/CABG 2014, AVR- bioprosthetic valve and AVR and MV endocarditis 2015 now on suppressive antibiotics (currently followed by [place]) who was told by [facility] to return to ED for 7/11positive blood cultures 2/4 (one bottle GPC chain strep, one bottle GPC cluster staph). Patient then transferred to [facility] for further evaluation. Patient initially went to [place] ED on 7/11 with complaints of abdominal pain, nausea, and vomiting which started 3 days ago then found to have on CT abdomen  sigmoid diverticulitis then treated with cipro and metronidazole. Patient reported today abdominal pain and vomiting resolved and had a normal formed bowel movement. Last episode of nausea and vomiting was last night. Also c/o intermittent dizziness upon position changes but denies chest pain or  shortness of breath. He denies fever, chills, URI symptoms and headaches.     Family concern about suppressive antibiotic Keflex for two years after endocarditis episode. Patient missed ID appointment as plan was for repeat 2 D echo in Jan 2017 to evaluate for resolved vegetation on MV).       Hospital Course:  The patient is a 78 y/o male with a significant history of colon cancer 2000 sp partial colon resection, hypertension, hyperlipidemia, TIAs, CAD s/p MI/CABG 2014, AVR- bioprosthetic valve and AVR and MV endocarditis 2015 now on suppressive antibiotics (currently followed by ID [facility]) who was told by [facility] to  return to ED for 7/11positive blood cultures 2/4 (one bottle GPC chain strep, one bottle GPC cluster staph). Patient then transferred to [facility] for further evaluation. Patient initially went to [place] ED on 7/11 with complaints of abdominal pain, nausea, and vomiting which started 3 days ago then found to have on CT abdomen  sigmoid diverticulitis then treated with cipro and metronidazole. Patient had some  abdominal pain and vomiting resolved and had a normal formed bowel movement. On cipro and flagyl.    - MRSA and strep viridance on blood culture on 7.11.17.   - repeat blood culture on 7.13.17 show no growth   - echo 7/14/2017 show persistent mitral vegetation,same size as before, AV is stable with bioprosthesis     -Family concern about suppressive antibiotic Keflex for two years after endocarditis episode.he was non compliant with Keflex. Patient missed ID appointment as plan was for repeat 2 D echo in Jan 2017 to evaluate for resolved vegetation on MV.  -picc line placed 7/17/2017  - plan for 6 weeks IV vancomycin  - ID to follow. Son concerned about patient not able to care for himself and do antibiotics. Currently looking for someone to hire and assist in the home vs possible SNF.    -Urinary retention and h/o increased frequency, started flomax and retention resolved  -dc held due to drop in sodium and hypotension SBP 80s - BP improved ad sodium dropped.    Fluid restriction dc'd and NS IVF given, pt reports not taking on much of diet          Interval History:pt I better spirits today but still gets confused, expressed my concerns to son and they will continue with care at home     Review of Systems   Constitutional: Positive for fatigue. Negative for appetite change and chills.   HENT: Negative for congestion, ear discharge and rhinorrhea.    Eyes: Negative for pain, redness and itching.   Respiratory: Negative for cough, chest tightness, shortness of breath, wheezing and stridor.    Cardiovascular:  Negative for chest pain and leg swelling.   Gastrointestinal: Negative for abdominal pain, constipation, diarrhea, nausea and vomiting.   Endocrine: Negative for cold intolerance and polydipsia.   Genitourinary: Negative for dysuria and hematuria.   Musculoskeletal: Negative for arthralgias, joint swelling and myalgias.   Skin: Negative for color change and pallor.   Neurological: Negative for syncope, light-headedness and headaches.   Psychiatric/Behavioral: Positive for sleep disturbance. Negative for confusion and hallucinations. The patient is not nervous/anxious.      Objective:     Vital Signs (Most Recent):  Temp: 98.3 °F (36.8 °C) (07/20/17 1955)  Pulse: 94 (07/20/17 1955)  Resp: 17 (07/20/17 1955)  BP: (!) 142/66 (07/20/17 1955)  SpO2: 95 % (07/20/17 1955) Vital Signs (24h Range):  Temp:  [97.7 °F (36.5 °C)-98.3 °F (36.8 °C)] 98.3 °F (36.8 °C)  Pulse:  [70-94] 94  Resp:  [17-20] 17  SpO2:  [95 %-96 %] 95 %  BP: (114-142)/(56-80) 142/66     Weight: 98.9 kg (218 lb)  Body mass index is 32.66 kg/m².    Intake/Output Summary (Last 24 hours) at 07/20/17 2152  Last data filed at 07/20/17 1100   Gross per 24 hour   Intake              360 ml   Output              200 ml   Net              160 ml      Physical Exam   Constitutional: He is oriented to person, place, and time. He appears well-developed and well-nourished. No distress.   HENT:   Head: Normocephalic and atraumatic.   Right Ear: External ear normal.   Left Ear: External ear normal.   Eyes: Conjunctivae and EOM are normal. Pupils are equal, round, and reactive to light.   Neck: Normal range of motion. Neck supple.   Cardiovascular: Normal rate and regular rhythm.    Murmur heard.   Systolic murmur is present   Pulmonary/Chest: Effort normal and breath sounds normal. He has no wheezes. He has no rales.   Abdominal: Soft. Bowel sounds are normal. There is no tenderness. There is no guarding.   Musculoskeletal: Normal range of motion.   Neurological: He is  alert and oriented to person, place, and time. No cranial nerve deficit.   Skin: Skin is warm. He is not diaphoretic.   Psychiatric: He has a normal mood and affect. His behavior is normal. Judgment and thought content normal.   Vitals reviewed.      Significant Labs:   BMP:   Recent Labs  Lab 07/20/17  1925   *   *   K 4.8   CL 90*   CO2 22*   BUN 26*   CREATININE 1.4   CALCIUM 9.2     CBC:   Recent Labs  Lab 07/19/17  0500 07/20/17  0345   WBC 13.89* 14.60*   HGB 9.5* 9.6*   HCT 29.0* 29.5*    356*     POCT Glucose: No results for input(s): POCTGLUCOSE in the last 48 hours.    Significant Imaging: I have reviewed all pertinent imaging results/findings within the past 24 hours.    Assessment/Plan:      Hypotension due to drugs    DC lisinopril  Lower norvasc 2.5 mg with holding parameters  Continue metoprolol           Hyponatremia    On admit sodium 134, now down to 121  Placed on fluid restriction - now dc'd  Urine sodium and osmolarity   NS IVF   DC'd lasix  Thyroid labs in normal range              Urinary retention    Start flomax and monitor  Likely undiagnosed BPH           Bacteremia due to Staphylococcus aureus    On 7.11.17 ,Source infected mitral valves,endocarditis,,on Vanc.repeat blood culture on 7.13.17 no growth sofar.placed picc line 7/17/2017.follow vanc. Through level.          Diverticulitis of large intestine without bleeding    Appears to be resolving as no further abdominal pain or NVD. Had formed BM. Need repeat colonoscopy as out patient after infectious process resolve.  Off cipro and flagyl         Depression    Pt not compliant with Zoloft and Wellbutrin. Restart Zoloft and wellbutrin.           History of colon cancer    Pt had colonoscopy 2 years ago and reported unremarkable. Encourage follow up with GI.          Hypothyroidism    Continue Synthroid.  Lab Results   Component Value Date    TSH 1.730 02/06/2017             Chronic kidney disease, stage III (moderate)     Cr=1.8, BUN 23.GFR 44. Baseline Cr ~1.5. Continue IV fluids. Avoid nephrotoxic medications.        Hyperlipidemia    Pt not compliant with current statin regiment. Continue and encourage daily statin intake.   Lab Results   Component Value Date    LDLCALC 65.2 06/17/2017             Essential hypertension              S/P AVR    See above, endocarditis.        S/P CABG (coronary artery bypass graft)    No acute issues. Continue ASA and statin.          * Endocarditis of mitral valve      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 Per ID documentation 12/2015 [facility] showed vegetation on aortic and mitral valve.     Per their discharge summary, cultures had cleared at time of discharge on 11/27.  1/25/16 TTE shows mobile mass on mitral valve and resolved vegetation on aortic valve. Patient was treated with IV rocephin x 6 weeks then placed on suppressive antibiotic Keflex and plan was for repeat 2 D echo in Jan 2017 however patient have missed appointment with ID.he was also non compliant with Keflex.CT   had no plan for intervention, Obtained 2 D echo,show same seize MV regurgitation, inflammatory markers elevated, Consulted ID,on vanc.  cardiology planing for KHALIDA today,but patient refusing at this time..patient has been informed no guarranty for resolution of infection.he is non compliant with medical treatment, picc line placed.need 6 weeks IV Abx  -awaiting for son to respond with plan, hire help at home vs placement at SNF          VTE Risk Mitigation         Ordered     heparin (porcine) injection 5,000 Units  Every 8 hours     Route:  Subcutaneous        07/14/17 0640     Place WALDO hose  Until discontinued      07/13/17 1843     Place sequential compression device  Until discontinued      07/13/17 1843          Meeta Swan MD  Department of Hospital Medicine   [facility] [place]

## 2017-07-22 LAB
ANION GAP SERPL CALC-SCNC: 4 MMOL/L
ANION GAP SERPL CALC-SCNC: 5 MMOL/L
ANION GAP SERPL CALC-SCNC: 6 MMOL/L
ANION GAP SERPL CALC-SCNC: 6 MMOL/L
ANION GAP SERPL CALC-SCNC: 8 MMOL/L
ANION GAP SERPL CALC-SCNC: 8 MMOL/L
BASOPHILS # BLD AUTO: 0.05 K/UL
BASOPHILS NFR BLD: 0.3 %
BUN SERPL-MCNC: 16 MG/DL
BUN SERPL-MCNC: 18 MG/DL
BUN SERPL-MCNC: 20 MG/DL
BUN SERPL-MCNC: 21 MG/DL
CALCIUM SERPL-MCNC: 8.4 MG/DL
CALCIUM SERPL-MCNC: 8.4 MG/DL
CALCIUM SERPL-MCNC: 8.6 MG/DL
CALCIUM SERPL-MCNC: 8.7 MG/DL
CALCIUM SERPL-MCNC: 8.9 MG/DL
CALCIUM SERPL-MCNC: 9.1 MG/DL
CHLORIDE SERPL-SCNC: 89 MMOL/L
CHLORIDE SERPL-SCNC: 90 MMOL/L
CHLORIDE SERPL-SCNC: 90 MMOL/L
CHLORIDE SERPL-SCNC: 91 MMOL/L
CHLORIDE SERPL-SCNC: 92 MMOL/L
CHLORIDE SERPL-SCNC: 92 MMOL/L
CO2 SERPL-SCNC: 21 MMOL/L
CO2 SERPL-SCNC: 21 MMOL/L
CO2 SERPL-SCNC: 23 MMOL/L
CO2 SERPL-SCNC: 24 MMOL/L
CO2 SERPL-SCNC: 24 MMOL/L
CO2 SERPL-SCNC: 25 MMOL/L
CORTIS SERPL-MCNC: 21.7 UG/DL
CORTIS SERPL-MCNC: 30.5 UG/DL
CORTIS SERPL-MCNC: 35.8 UG/DL
CREAT SERPL-MCNC: 1.1 MG/DL
CREAT SERPL-MCNC: 1.2 MG/DL
DIFFERENTIAL METHOD: ABNORMAL
EOSINOPHIL # BLD AUTO: 0.4 K/UL
EOSINOPHIL NFR BLD: 2 %
ERYTHROCYTE [DISTWIDTH] IN BLOOD BY AUTOMATED COUNT: 14.5 %
EST. GFR  (AFRICAN AMERICAN): >60 ML/MIN/1.73 M^2
EST. GFR  (NON AFRICAN AMERICAN): 58 ML/MIN/1.73 M^2
EST. GFR  (NON AFRICAN AMERICAN): >60 ML/MIN/1.73 M^2
GLUCOSE SERPL-MCNC: 123 MG/DL
GLUCOSE SERPL-MCNC: 135 MG/DL
GLUCOSE SERPL-MCNC: 157 MG/DL
GLUCOSE SERPL-MCNC: 158 MG/DL
GLUCOSE SERPL-MCNC: 158 MG/DL
GLUCOSE SERPL-MCNC: 192 MG/DL
HCT VFR BLD AUTO: 30.2 %
HGB BLD-MCNC: 10.5 G/DL
LYMPHOCYTES # BLD AUTO: 2.1 K/UL
LYMPHOCYTES NFR BLD: 10.7 %
MCH RBC QN AUTO: 27.6 PG
MCHC RBC AUTO-ENTMCNC: 34.8 G/DL
MCV RBC AUTO: 80 FL
MONOCYTES # BLD AUTO: 1.8 K/UL
MONOCYTES NFR BLD: 9.2 %
NEUTROPHILS # BLD AUTO: 14.9 K/UL
NEUTROPHILS NFR BLD: 76.4 %
PLATELET # BLD AUTO: 362 K/UL
PMV BLD AUTO: 10.6 FL
POTASSIUM SERPL-SCNC: 4.4 MMOL/L
POTASSIUM SERPL-SCNC: 4.5 MMOL/L
POTASSIUM SERPL-SCNC: 4.6 MMOL/L
POTASSIUM SERPL-SCNC: 4.8 MMOL/L
POTASSIUM SERPL-SCNC: 5 MMOL/L
POTASSIUM SERPL-SCNC: 5.1 MMOL/L
RBC # BLD AUTO: 3.8 M/UL
SODIUM SERPL-SCNC: 119 MMOL/L
SODIUM SERPL-SCNC: 120 MMOL/L
SODIUM SERPL-SCNC: 121 MMOL/L
SODIUM SERPL-SCNC: 121 MMOL/L
WBC # BLD AUTO: 19.46 K/UL

## 2017-07-22 PROCEDURE — 36415 COLL VENOUS BLD VENIPUNCTURE: CPT

## 2017-07-22 PROCEDURE — 85025 COMPLETE CBC W/AUTO DIFF WBC: CPT

## 2017-07-22 PROCEDURE — 80048 BASIC METABOLIC PNL TOTAL CA: CPT | Mod: 91

## 2017-07-22 PROCEDURE — 25000003 PHARM REV CODE 250: Performed by: HOSPITALIST

## 2017-07-22 PROCEDURE — 12000002 HC ACUTE/MED SURGE SEMI-PRIVATE ROOM

## 2017-07-22 PROCEDURE — 25000003 PHARM REV CODE 250: Performed by: INTERNAL MEDICINE

## 2017-07-22 PROCEDURE — 63600175 PHARM REV CODE 636 W HCPCS: Performed by: HOSPITALIST

## 2017-07-22 PROCEDURE — 82533 TOTAL CORTISOL: CPT

## 2017-07-22 PROCEDURE — 25000003 PHARM REV CODE 250: Performed by: NURSE PRACTITIONER

## 2017-07-22 PROCEDURE — 63600175 PHARM REV CODE 636 W HCPCS: Performed by: INTERNAL MEDICINE

## 2017-07-22 RX ADMIN — POLYETHYLENE GLYCOL 3350 17 G: 17 POWDER, FOR SOLUTION ORAL at 08:07

## 2017-07-22 RX ADMIN — HEPARIN SODIUM 5000 UNITS: 5000 INJECTION, SOLUTION INTRAVENOUS; SUBCUTANEOUS at 05:07

## 2017-07-22 RX ADMIN — BUPROPION HYDROCHLORIDE 300 MG: 150 TABLET, FILM COATED, EXTENDED RELEASE ORAL at 08:07

## 2017-07-22 RX ADMIN — COSYNTROPIN 0.25 MG: 0.25 INJECTION, POWDER, LYOPHILIZED, FOR SOLUTION INTRAVENOUS at 12:07

## 2017-07-22 RX ADMIN — Medication 10 ML: at 05:07

## 2017-07-22 RX ADMIN — SIMETHICONE CHEW TAB 80 MG 80 MG: 80 TABLET ORAL at 12:07

## 2017-07-22 RX ADMIN — TAMSULOSIN HYDROCHLORIDE 0.4 MG: 0.4 CAPSULE ORAL at 08:07

## 2017-07-22 RX ADMIN — ATORVASTATIN CALCIUM 10 MG: 10 TABLET, FILM COATED ORAL at 08:07

## 2017-07-22 RX ADMIN — PANTOPRAZOLE SODIUM 40 MG: 40 TABLET, DELAYED RELEASE ORAL at 08:07

## 2017-07-22 RX ADMIN — HEPARIN SODIUM 5000 UNITS: 5000 INJECTION, SOLUTION INTRAVENOUS; SUBCUTANEOUS at 10:07

## 2017-07-22 RX ADMIN — VANCOMYCIN HYDROCHLORIDE 1250 MG: 1 INJECTION, POWDER, LYOPHILIZED, FOR SOLUTION INTRAVENOUS at 03:07

## 2017-07-22 RX ADMIN — LEVOTHYROXINE SODIUM 75 MCG: 75 TABLET ORAL at 05:07

## 2017-07-22 RX ADMIN — Medication 10 ML: at 12:07

## 2017-07-22 RX ADMIN — RAMELTEON 8 MG: 8 TABLET, FILM COATED ORAL at 08:07

## 2017-07-22 RX ADMIN — HEPARIN SODIUM 5000 UNITS: 5000 INJECTION, SOLUTION INTRAVENOUS; SUBCUTANEOUS at 01:07

## 2017-07-22 RX ADMIN — Medication 10 ML: at 01:07

## 2017-07-22 RX ADMIN — METOPROLOL SUCCINATE 25 MG: 25 TABLET, EXTENDED RELEASE ORAL at 08:07

## 2017-07-22 RX ADMIN — AMLODIPINE BESYLATE 2.5 MG: 2.5 TABLET ORAL at 08:07

## 2017-07-22 RX ADMIN — ASPIRIN 81 MG: 81 TABLET, COATED ORAL at 08:07

## 2017-07-22 RX ADMIN — Medication 10 ML: at 07:07

## 2017-07-22 NOTE — PROGRESS NOTES
[facility] Hospital Medicine  Progress Note    Patient Name: Rolo Blanca  MRN: [id number]  Patient Class: IP- Inpatient   Admission Date: 7/13/2017  Length of Stay: 8 days  Attending Physician: Meeta Swan MD  Primary Care Provider: Scott Valadez MD        Subjective:     Principal Problem:Endocarditis of mitral valve    HPI:  The patient is a 78 y/o male with a significant history of colon cancer 2000 sp partial colon resection, hypertension, hyperlipidemia, TIAs, CAD s/p MI/CABG 2014, AVR- bioprosthetic valve and AVR and MV endocarditis 2015 now on suppressive antibiotics (currently followed by ID -[facility]) who was told by [facility] to return to ED for 7/11positive blood cultures 2/4 (one bottle GPC chain strep, one bottle GPC cluster staph). Patient then transferred to [facility] for further evaluation. Patient initially went to [place] ED on 7/11 with complaints of abdominal pain, nausea, and vomiting which started 3 days ago then found to have on CT abdomen  sigmoid diverticulitis then treated with cipro and metronidazole. Patient reported today abdominal pain and vomiting resolved and had a normal formed bowel movement. Last episode of nausea and vomiting was last night. Also c/o intermittent dizziness upon position changes but denies chest pain or  shortness of breath. He denies fever, chills, URI symptoms and headaches.     Family concern about suppressive antibiotic Keflex for two years after endocarditis episode. Patient missed ID appointment as plan was for repeat 2 D echo in Jan 2017 to evaluate for resolved vegetation on MV).       Hospital Course:  The patient is a 78 y/o male with a significant history of colon cancer 2000 sp partial colon resection, hypertension, hyperlipidemia, TIAs, CAD s/p MI/CABG 2014, AVR- bioprosthetic valve and AVR and MV endocarditis 2015 now on suppressive antibiotics (currently followed by [facility] -[facility]) who was told by [facility] to  return to ED for 7/11positive blood cultures 2/4 (one bottle GPC chain strep, one bottle GPC cluster staph). Patient then transferred to [facility] for further evaluation. Patient initially went to [place] ED on 7/11 with complaints of abdominal pain, nausea, and vomiting which started 3 days ago then found to have on CT abdomen  sigmoid diverticulitis then treated with cipro and metronidazole. Patient had some  abdominal pain and vomiting resolved and had a normal formed bowel movement. On cipro and flagyl.    - MRSA and strep viridance on blood culture on 7.11.17.   - repeat blood culture on 7.13.17 show no growth   - echo 7/14/2017 show persistent mitral vegetation,same size as before, AV is stable with bioprosthesis     -Family concern about suppressive antibiotic Keflex for two years after endocarditis episode.he was non compliant with Keflex. Patient missed ID appointment as plan was for repeat 2 D echo in Jan 2017 to evaluate for resolved vegetation on MV.  -picc line placed 7/17/2017  - plan for 6 weeks IV vancomycin  - ID to follow. Son concerned about patient not able to care for himself and do antibiotics. Currently looking for someone to hire and assist in the home vs possible SNF.    -Urinary retention and h/o increased frequency, started flomax and retention resolved  -dc held due to drop in sodium and hypotension SBP 80s - BP improved ad sodium dropped.    Fluid restriction dc'd and NS IVF given, pt reports not taking on much of diet. Sodium remains low. Cosyntropin test in am. MRI in am. Pt agreed to KHALIDA and spoke with cardiology to do next week.         Interval History: highly anxious and no sleep, discussed sleeping agent; sodium remains low with fluid restriction and IVF. Cortisol and cosyntropin test pending. MRI to eval pituitary and emboli infarcts. Pt unsteady on feet with wax and wane neuro changes     Review of Systems   Constitutional: Positive for fatigue. Negative for appetite change and  chills.   HENT: Negative for congestion, ear discharge and rhinorrhea.    Eyes: Negative for pain, redness and itching.   Respiratory: Negative for cough, chest tightness, shortness of breath, wheezing and stridor.    Cardiovascular: Negative for chest pain and leg swelling.   Gastrointestinal: Negative for abdominal pain, constipation, diarrhea, nausea and vomiting.   Endocrine: Negative for cold intolerance and polydipsia.   Genitourinary: Negative for dysuria and hematuria.   Musculoskeletal: Negative for arthralgias, joint swelling and myalgias.   Skin: Negative for color change and pallor.   Neurological: Negative for syncope, light-headedness and headaches.   Psychiatric/Behavioral: Positive for sleep disturbance. Negative for confusion and hallucinations. The patient is not nervous/anxious.      Objective:     Vital Signs (Most Recent):  Temp: 98.4 °F (36.9 °C) (07/21/17 1915)  Pulse: 92 (07/21/17 1915)  Resp: 18 (07/21/17 1915)  BP: 132/61 (07/21/17 1915)  SpO2: 97 % (07/21/17 1915) Vital Signs (24h Range):  Temp:  [97.6 °F (36.4 °C)-98.4 °F (36.9 °C)] 98.4 °F (36.9 °C)  Pulse:  [] 92  Resp:  [18-22] 18  SpO2:  [95 %-97 %] 97 %  BP: (132-179)/(61-86) 132/61     Weight: 98.9 kg (218 lb)  Body mass index is 32.66 kg/m².    Intake/Output Summary (Last 24 hours) at 07/21/17 2147  Last data filed at 07/21/17 1400   Gross per 24 hour   Intake          1028.33 ml   Output              950 ml   Net            78.33 ml      Physical Exam   Constitutional: He is oriented to person, place, and time. He appears well-developed and well-nourished. No distress.   HENT:   Head: Normocephalic and atraumatic.   Right Ear: External ear normal.   Left Ear: External ear normal.   Eyes: Conjunctivae and EOM are normal. Pupils are equal, round, and reactive to light.   Neck: Normal range of motion. Neck supple.   Cardiovascular: Normal rate and regular rhythm.    Murmur heard.   Systolic murmur is present   Pulmonary/Chest:  Effort normal and breath sounds normal. He has no wheezes. He has no rales.   Abdominal: Soft. Bowel sounds are normal. There is no tenderness. There is no guarding.   Musculoskeletal: Normal range of motion.   Neurological: He is alert and oriented to person, place, and time. No cranial nerve deficit.   Skin: Skin is warm. He is not diaphoretic.   Psychiatric: He has a normal mood and affect. His behavior is normal. Judgment and thought content normal.   Vitals reviewed.      Significant Labs:   BMP:   Recent Labs  Lab 07/21/17 2004   *   *   K 4.8   CL 90*   CO2 22*   BUN 18   CREATININE 1.1   CALCIUM 8.9     CBC:   Recent Labs  Lab 07/20/17  0345 07/21/17  0414   WBC 14.60* 15.27*   HGB 9.6* 9.7*   HCT 29.5* 29.7*   * 354*       Significant Imaging: I have reviewed all pertinent imaging results/findings within the past 24 hours.    Assessment/Plan:      Hypotension due to drugs    DC lisinopril  Lower norvasc 2.5 mg with holding parameters  Continue metoprolol           Hyponatremia    On admit sodium 134, now down to 121  Placed on fluid restriction - now dc'd  Urine sodium and osmolarity   NS IVF -dc'd with lowering NA  DC'd lasix  Thyroid labs in normal range  Spoke with neuro as he has some wax and wane neuro changes and unsteady gait, recommend MRI to eval pituitary/embolic infarcts  Cortisol and cosyntropin test pending               Urinary retention    Start flomax and monitor  Likely undiagnosed BPH           Bacteremia due to Staphylococcus aureus    On 7.11.17 ,Source infected mitral valves,endocarditis,,on Vanc.repeat blood culture on 7.13.17 no growth sofar.placed picc line 7/17/2017.follow vanc. Through level.          Diverticulitis of large intestine without bleeding    Appears to be resolving as no further abdominal pain or NVD. Had formed BM. Need repeat colonoscopy as out patient after infectious process resolve.  Off cipro and flagyl         Depression    zoloft dc'd due  to SSRi and hyponatremia  Continue wellbutrin           History of colon cancer    Pt had colonoscopy 2 years ago and reported unremarkable. Encourage follow up with GI.          Hypothyroidism    Continue Synthroid.  Lab Results   Component Value Date    TSH 3.269 07/19/2017             Chronic kidney disease, stage III (moderate)    Cr=1.8, BUN 23.GFR 44. Baseline Cr ~1.5. Continue IV fluids. Avoid nephrotoxic medications.        Hyperlipidemia    Pt not compliant with current statin regiment. Continue and encourage daily statin intake.   Lab Results   Component Value Date    LDLCALC 65.2 06/17/2017             Essential hypertension              S/P AVR    See above, endocarditis.        S/P CABG (coronary artery bypass graft)    No acute issues. Continue ASA and statin.          * Endocarditis of mitral valve      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 Per ID documentation 12/2015 [facility] KHALIDA showed vegetation on aortic and mitral valve.     Per their discharge summary, cultures had cleared at time of discharge on 11/27.  1/25/16 TTE shows mobile mass on mitral valve and resolved vegetation on aortic valve. Patient was treated with IV rocephin x 6 weeks then placed on suppressive antibiotic Keflex and plan was for repeat 2 D echo in Jan 2017 however patient have missed appointment with ID.he was also non compliant with Keflex.CT   had no plan for intervention, Obtained 2 D echo,show same seize MV regurgitation, inflammatory markers elevated, Consulted ID,on vanc.  cardiology planing for KHALIDA today,but patient refusing at this time..patient has been informed no guarranty for resolution of infection.he is non compliant with medical treatment, picc line placed.need 6 weeks IV Abx  -awaiting for son to respond with plan, hire help at home     Dc held due to hyponatremia - work up in progress  Pt agreed to KHALIDA and plan next week          VTE Risk  Mitigation         Ordered     heparin (porcine) injection 5,000 Units  Every 8 hours     Route:  Subcutaneous        07/14/17 0640     Place WALDO hose  Until discontinued      07/13/17 1843     Place sequential compression device  Until discontinued      07/13/17 1843          Meeta Swan MD  Department of Hospital Medicine   [facility]

## 2017-07-22 NOTE — SUBJECTIVE & OBJECTIVE
Interval History: highly anxious and no sleep, discussed sleeping agent; sodium remains low with fluid restriction and IVF. Cortisol and cosyntropin test pending. MRI to eval pituitary and emboli infarcts. Pt unsteady on feet with wax and wane neuro changes     Review of Systems   Constitutional: Positive for fatigue. Negative for appetite change and chills.   HENT: Negative for congestion, ear discharge and rhinorrhea.    Eyes: Negative for pain, redness and itching.   Respiratory: Negative for cough, chest tightness, shortness of breath, wheezing and stridor.    Cardiovascular: Negative for chest pain and leg swelling.   Gastrointestinal: Negative for abdominal pain, constipation, diarrhea, nausea and vomiting.   Endocrine: Negative for cold intolerance and polydipsia.   Genitourinary: Negative for dysuria and hematuria.   Musculoskeletal: Negative for arthralgias, joint swelling and myalgias.   Skin: Negative for color change and pallor.   Neurological: Negative for syncope, light-headedness and headaches.   Psychiatric/Behavioral: Positive for sleep disturbance. Negative for confusion and hallucinations. The patient is not nervous/anxious.      Objective:     Vital Signs (Most Recent):  Temp: 98.4 °F (36.9 °C) (07/21/17 1915)  Pulse: 92 (07/21/17 1915)  Resp: 18 (07/21/17 1915)  BP: 132/61 (07/21/17 1915)  SpO2: 97 % (07/21/17 1915) Vital Signs (24h Range):  Temp:  [97.6 °F (36.4 °C)-98.4 °F (36.9 °C)] 98.4 °F (36.9 °C)  Pulse:  [] 92  Resp:  [18-22] 18  SpO2:  [95 %-97 %] 97 %  BP: (132-179)/(61-86) 132/61     Weight: 98.9 kg (218 lb)  Body mass index is 32.66 kg/m².    Intake/Output Summary (Last 24 hours) at 07/21/17 2147  Last data filed at 07/21/17 1400   Gross per 24 hour   Intake          1028.33 ml   Output              950 ml   Net            78.33 ml      Physical Exam   Constitutional: He is oriented to person, place, and time. He appears well-developed and well-nourished. No distress.   HENT:    Head: Normocephalic and atraumatic.   Right Ear: External ear normal.   Left Ear: External ear normal.   Eyes: Conjunctivae and EOM are normal. Pupils are equal, round, and reactive to light.   Neck: Normal range of motion. Neck supple.   Cardiovascular: Normal rate and regular rhythm.    Murmur heard.   Systolic murmur is present   Pulmonary/Chest: Effort normal and breath sounds normal. He has no wheezes. He has no rales.   Abdominal: Soft. Bowel sounds are normal. There is no tenderness. There is no guarding.   Musculoskeletal: Normal range of motion.   Neurological: He is alert and oriented to person, place, and time. No cranial nerve deficit.   Skin: Skin is warm. He is not diaphoretic.   Psychiatric: He has a normal mood and affect. His behavior is normal. Judgment and thought content normal.   Vitals reviewed.      Significant Labs:   BMP:   Recent Labs  Lab 07/21/17 2004   *   *   K 4.8   CL 90*   CO2 22*   BUN 18   CREATININE 1.1   CALCIUM 8.9     CBC:   Recent Labs  Lab 07/20/17  0345 07/21/17  0414   WBC 14.60* 15.27*   HGB 9.6* 9.7*   HCT 29.5* 29.7*   * 354*       Significant Imaging: I have reviewed all pertinent imaging results/findings within the past 24 hours.

## 2017-07-22 NOTE — ASSESSMENT & PLAN NOTE
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 Per ID documentation 12/2015 [facility] KHALIDA showed vegetation on aortic and mitral valve.     Per their discharge summary, cultures had cleared at time of discharge on 11/27.  1/25/16 TTE shows mobile mass on mitral valve and resolved vegetation on aortic valve. Patient was treated with IV rocephin x 6 weeks then placed on suppressive antibiotic Keflex and plan was for repeat 2 D echo in Jan 2017 however patient have missed appointment with ID.he was also non compliant with Keflex.CT   had no plan for intervention, Obtained 2 D echo,show same seize MV regurgitation, inflammatory markers elevated, Consulted ID,on vanc.  cardiology planing for KHALIDA today,but patient refusing at this time..patient has been informed no guarranty for resolution of infection.he is non compliant with medical treatment, picc line placed.need 6 weeks IV Abx  -awaiting for son to respond with plan, hire help at home     Dc held due to hyponatremia - work up in progress  Pt agreed to KHALIDA and plan next week

## 2017-07-22 NOTE — PROGRESS NOTES
dtr Consuelo called & informed her that pt needs Mitral valve card w/serial number to have MRI performed, pt's dtr stated she will try to get her hands on card or call hospital in [place] to get copy of card, will monitor

## 2017-07-22 NOTE — PLAN OF CARE
Problem: Patient Care Overview  Goal: Plan of Care Review  Outcome: Ongoing (interventions implemented as appropriate)  Pt w/plans for KHALIDA Mon, ID & cardiology following, IV abx x6 wks once d/c'd, JAMES PICC in place, IV abx per order, family at bedside assisting w/care, contact isolation maintained, safety maintained, bed low locked and in position, will cont plan of care

## 2017-07-22 NOTE — ASSESSMENT & PLAN NOTE
On admit sodium 134, now down to 121  Placed on fluid restriction - now dc'd  Urine sodium and osmolarity   NS IVF -dc'd with lowering NA  DC'd lasix  Thyroid labs in normal range  Spoke with neuro as he has some wax and wane neuro changes and unsteady gait, recommend MRI to eval pituitary/embolic infarcts  Cortisol and cosyntropin test pending

## 2017-07-22 NOTE — PROGRESS NOTES
Pt previously seen by Dr. Pedersen.  Chart reviewed.  Pt previously refused KHALIDA.  Asked to perform KHALIDA by José Davenport/Beny.  Discussed with pt/family at bedside.  They agree, will plan for Monday 7/24/17.    Risks, benefits and alternatives of the KHALIDA procedure were discussed with the patient and family, including throat irritation, aspiration, anesthetic complications, esophageal irritation/perforation, etc.  Patient understands and agrees to proceed.  Consent was placed on the chart.

## 2017-07-22 NOTE — PROGRESS NOTES
Pt in the bathroom  remains with a profound hyponatremia  Blood cultures negative  Discussed with   The only purpose of the KHALIDA would be to visualise the aortic valve  He is not behaving like he has endocarditis but with a prosthetic aortic valve at stake I would still recommend 6 weeks of rx

## 2017-07-22 NOTE — PROGRESS NOTES
Spoke to dtr Consuelo r/t faxing POA paperwork, dtr to fax paperwork shortly and is stating she wants pt to have KHALIDA & MRI which pt formerly refused, will inform MD

## 2017-07-22 NOTE — PROGRESS NOTES
LAB called with critical  Sodium level of 119.  at nurse's station & aware. Continue to monitor pt.

## 2017-07-23 LAB
ANION GAP SERPL CALC-SCNC: 5 MMOL/L
ANION GAP SERPL CALC-SCNC: 6 MMOL/L
ANION GAP SERPL CALC-SCNC: 8 MMOL/L
ANION GAP SERPL CALC-SCNC: 8 MMOL/L
BASOPHILS # BLD AUTO: 0.04 K/UL
BASOPHILS NFR BLD: 0.2 %
BUN SERPL-MCNC: 22 MG/DL
BUN SERPL-MCNC: 22 MG/DL
BUN SERPL-MCNC: 23 MG/DL
BUN SERPL-MCNC: 24 MG/DL
BUN SERPL-MCNC: 25 MG/DL
BUN SERPL-MCNC: 25 MG/DL
CALCIUM SERPL-MCNC: 8.5 MG/DL
CALCIUM SERPL-MCNC: 8.5 MG/DL
CALCIUM SERPL-MCNC: 8.7 MG/DL
CALCIUM SERPL-MCNC: 8.7 MG/DL
CALCIUM SERPL-MCNC: 8.8 MG/DL
CALCIUM SERPL-MCNC: 8.8 MG/DL
CHLORIDE SERPL-SCNC: 91 MMOL/L
CHLORIDE SERPL-SCNC: 92 MMOL/L
CO2 SERPL-SCNC: 21 MMOL/L
CO2 SERPL-SCNC: 22 MMOL/L
CO2 SERPL-SCNC: 23 MMOL/L
CO2 SERPL-SCNC: 24 MMOL/L
CO2 SERPL-SCNC: 24 MMOL/L
CO2 SERPL-SCNC: 25 MMOL/L
CREAT SERPL-MCNC: 1.2 MG/DL
DIFFERENTIAL METHOD: ABNORMAL
EOSINOPHIL # BLD AUTO: 0.6 K/UL
EOSINOPHIL NFR BLD: 3 %
ERYTHROCYTE [DISTWIDTH] IN BLOOD BY AUTOMATED COUNT: 15.2 %
EST. GFR  (AFRICAN AMERICAN): >60 ML/MIN/1.73 M^2
EST. GFR  (NON AFRICAN AMERICAN): 58 ML/MIN/1.73 M^2
GLUCOSE SERPL-MCNC: 115 MG/DL
GLUCOSE SERPL-MCNC: 129 MG/DL
GLUCOSE SERPL-MCNC: 133 MG/DL
GLUCOSE SERPL-MCNC: 145 MG/DL
GLUCOSE SERPL-MCNC: 149 MG/DL
GLUCOSE SERPL-MCNC: 173 MG/DL
HCT VFR BLD AUTO: 28 %
HGB BLD-MCNC: 9.3 G/DL
LYMPHOCYTES # BLD AUTO: 2 K/UL
LYMPHOCYTES NFR BLD: 10.9 %
MCH RBC QN AUTO: 26.6 PG
MCHC RBC AUTO-ENTMCNC: 33.2 G/DL
MCV RBC AUTO: 80 FL
MONOCYTES # BLD AUTO: 1.6 K/UL
MONOCYTES NFR BLD: 8.9 %
NEUTROPHILS # BLD AUTO: 14 K/UL
NEUTROPHILS NFR BLD: 77 %
PLATELET # BLD AUTO: 310 K/UL
PMV BLD AUTO: 10 FL
POTASSIUM SERPL-SCNC: 4.6 MMOL/L
POTASSIUM SERPL-SCNC: 4.6 MMOL/L
POTASSIUM SERPL-SCNC: 4.7 MMOL/L
POTASSIUM SERPL-SCNC: 4.8 MMOL/L
POTASSIUM SERPL-SCNC: 5 MMOL/L
POTASSIUM SERPL-SCNC: 5 MMOL/L
RBC # BLD AUTO: 3.49 M/UL
SODIUM SERPL-SCNC: 120 MMOL/L
SODIUM SERPL-SCNC: 120 MMOL/L
SODIUM SERPL-SCNC: 121 MMOL/L
SODIUM SERPL-SCNC: 121 MMOL/L
SODIUM SERPL-SCNC: 122 MMOL/L
SODIUM SERPL-SCNC: 123 MMOL/L
WBC # BLD AUTO: 18.13 K/UL

## 2017-07-23 PROCEDURE — 25000003 PHARM REV CODE 250: Performed by: HOSPITALIST

## 2017-07-23 PROCEDURE — 12000002 HC ACUTE/MED SURGE SEMI-PRIVATE ROOM

## 2017-07-23 PROCEDURE — 63600175 PHARM REV CODE 636 W HCPCS: Performed by: INTERNAL MEDICINE

## 2017-07-23 PROCEDURE — 80048 BASIC METABOLIC PNL TOTAL CA: CPT | Mod: 91

## 2017-07-23 PROCEDURE — 25000003 PHARM REV CODE 250: Performed by: NURSE PRACTITIONER

## 2017-07-23 PROCEDURE — 85025 COMPLETE CBC W/AUTO DIFF WBC: CPT

## 2017-07-23 PROCEDURE — 36415 COLL VENOUS BLD VENIPUNCTURE: CPT

## 2017-07-23 PROCEDURE — 25000003 PHARM REV CODE 250: Performed by: INTERNAL MEDICINE

## 2017-07-23 RX ORDER — MIRTAZAPINE 15 MG/1
15 TABLET, FILM COATED ORAL NIGHTLY
Status: DISCONTINUED | OUTPATIENT
Start: 2017-07-23 | End: 2017-07-25 | Stop reason: HOSPADM

## 2017-07-23 RX ADMIN — HEPARIN SODIUM 5000 UNITS: 5000 INJECTION, SOLUTION INTRAVENOUS; SUBCUTANEOUS at 05:07

## 2017-07-23 RX ADMIN — BUPROPION HYDROCHLORIDE 300 MG: 150 TABLET, FILM COATED, EXTENDED RELEASE ORAL at 08:07

## 2017-07-23 RX ADMIN — AMLODIPINE BESYLATE 2.5 MG: 2.5 TABLET ORAL at 08:07

## 2017-07-23 RX ADMIN — Medication 10 ML: at 12:07

## 2017-07-23 RX ADMIN — VANCOMYCIN HYDROCHLORIDE 1250 MG: 1 INJECTION, POWDER, LYOPHILIZED, FOR SOLUTION INTRAVENOUS at 03:07

## 2017-07-23 RX ADMIN — MIRTAZAPINE 15 MG: 15 TABLET, FILM COATED ORAL at 07:07

## 2017-07-23 RX ADMIN — PANTOPRAZOLE SODIUM 40 MG: 40 TABLET, DELAYED RELEASE ORAL at 08:07

## 2017-07-23 RX ADMIN — Medication 10 ML: at 05:07

## 2017-07-23 RX ADMIN — ATORVASTATIN CALCIUM 10 MG: 10 TABLET, FILM COATED ORAL at 08:07

## 2017-07-23 RX ADMIN — METOPROLOL SUCCINATE 25 MG: 25 TABLET, EXTENDED RELEASE ORAL at 08:07

## 2017-07-23 RX ADMIN — ASPIRIN 81 MG: 81 TABLET, COATED ORAL at 08:07

## 2017-07-23 RX ADMIN — POLYETHYLENE GLYCOL 3350 17 G: 17 POWDER, FOR SOLUTION ORAL at 08:07

## 2017-07-23 RX ADMIN — RAMELTEON 8 MG: 8 TABLET, FILM COATED ORAL at 08:07

## 2017-07-23 RX ADMIN — Medication 10 ML: at 02:07

## 2017-07-23 RX ADMIN — TAMSULOSIN HYDROCHLORIDE 0.4 MG: 0.4 CAPSULE ORAL at 08:07

## 2017-07-23 RX ADMIN — HEPARIN SODIUM 5000 UNITS: 5000 INJECTION, SOLUTION INTRAVENOUS; SUBCUTANEOUS at 02:07

## 2017-07-23 RX ADMIN — Medication 10 ML: at 06:07

## 2017-07-23 RX ADMIN — LEVOTHYROXINE SODIUM 75 MCG: 75 TABLET ORAL at 05:07

## 2017-07-23 RX ADMIN — HEPARIN SODIUM 5000 UNITS: 5000 INJECTION, SOLUTION INTRAVENOUS; SUBCUTANEOUS at 10:07

## 2017-07-23 NOTE — PROGRESS NOTES
[facility] Hospital Medicine  Progress Note    Patient Name: Rolo Blanca  MRN: [id number]  Patient Class: IP- Inpatient   Admission Date: 7/13/2017  Length of Stay: 9 days  Attending Physician: Meeta Swan MD  Primary Care Provider: Scott Valadez MD        Subjective:     Principal Problem:Endocarditis of mitral valve    HPI:  The patient is a 76 y/o male with a significant history of colon cancer 2000 sp partial colon resection, hypertension, hyperlipidemia, TIAs, CAD s/p MI/CABG 2014, AVR- bioprosthetic valve and AVR and MV endocarditis 2015 now on suppressive antibiotics (currently followed by ID -[facility]) who was told by [facility] to return to ED for 7/11positive blood cultures 2/4 (one bottle GPC chain strep, one bottle GPC cluster staph). Patient then transferred to [facility] for further evaluation. Patient initially went to [place] ED on 7/11 with complaints of abdominal pain, nausea, and vomiting which started 3 days ago then found to have on CT abdomen  sigmoid diverticulitis then treated with cipro and metronidazole. Patient reported today abdominal pain and vomiting resolved and had a normal formed bowel movement. Last episode of nausea and vomiting was last night. Also c/o intermittent dizziness upon position changes but denies chest pain or  shortness of breath. He denies fever, chills, URI symptoms and headaches.     Family concern about suppressive antibiotic Keflex for two years after endocarditis episode. Patient missed ID appointment as plan was for repeat 2 D echo in Jan 2017 to evaluate for resolved vegetation on MV).       Hospital Course:  The patient is a 76 y/o male with a significant history of colon cancer 2000 sp partial colon resection, hypertension, hyperlipidemia, TIAs, CAD s/p MI/CABG 2014, AVR- bioprosthetic valve and AVR and MV endocarditis 2015 now on suppressive antibiotics (currently followed by ID -[facility]) who was told by [facility] to  return to ED for 7/11positive blood cultures 2/4 (one bottle GPC chain strep, one bottle GPC cluster staph). Patient then transferred to [facility] for further evaluation. Patient initially went to [place] ED on 7/11 with complaints of abdominal pain, nausea, and vomiting which started 3 days ago then found to have on CT abdomen  sigmoid diverticulitis then treated with cipro and metronidazole. Patient had some  abdominal pain and vomiting resolved and had a normal formed bowel movement. On cipro and flagyl.    - MRSA and strep viridance on blood culture on 7.11.17.   - repeat blood culture on 7.13.17 show no growth   - echo 7/14/2017 show persistent mitral vegetation,same size as before, AV is stable with bioprosthesis     -Family concern about suppressive antibiotic Keflex for two years after endocarditis episode.he was non compliant with Keflex. Patient missed ID appointment as plan was for repeat 2 D echo in Jan 2017 to evaluate for resolved vegetation on MV.  -picc line placed 7/17/2017  - plan for 6 weeks IV vancomycin  - ID to follow. Son concerned about patient not able to care for himself and do antibiotics. Currently looking for someone to hire and assist in the home vs possible SNF.    -Urinary retention and h/o increased frequency, started flomax and retention resolved  -dc held due to drop in sodium and hypotension SBP 80s - BP improved ad sodium dropped.    Fluid restriction dc'd and NS IVF given, pt reports not taking on much of diet. Sodium remains low. Cosyntropin test in am. MRI in am. Pt agreed to KHALIDA and spoke with cardiology to do next week.     MRI pending verification of valve with radiology.         Interval History: pt reports bad night and morning because he has to be here    Review of Systems   Constitutional: Positive for fatigue. Negative for appetite change and chills.   HENT: Negative for congestion, ear discharge and rhinorrhea.    Eyes: Negative for pain, redness and itching.    Respiratory: Negative for cough, chest tightness, shortness of breath, wheezing and stridor.    Cardiovascular: Negative for chest pain and leg swelling.   Gastrointestinal: Negative for abdominal pain, constipation, diarrhea, nausea and vomiting.   Endocrine: Negative for cold intolerance and polydipsia.   Genitourinary: Negative for dysuria and hematuria.   Musculoskeletal: Negative for arthralgias, joint swelling and myalgias.   Skin: Negative for color change and pallor.   Neurological: Negative for syncope, light-headedness and headaches.   Psychiatric/Behavioral: Positive for sleep disturbance. Negative for confusion and hallucinations. The patient is not nervous/anxious.      Objective:     Vital Signs (Most Recent):  Temp: 97.8 °F (36.6 °C) (07/22/17 1910)  Pulse: 91 (07/22/17 2000)  Resp: 18 (07/22/17 1910)  BP: 122/60 (07/22/17 1910)  SpO2: 99 % (07/22/17 1910) Vital Signs (24h Range):  Temp:  [97.5 °F (36.4 °C)-98.6 °F (37 °C)] 97.8 °F (36.6 °C)  Pulse:  [84-99] 91  Resp:  [18-22] 18  SpO2:  [94 %-100 %] 99 %  BP: (119-169)/(60-89) 122/60     Weight: 101.8 kg (224 lb 8 oz)  Body mass index is 33.64 kg/m².    Intake/Output Summary (Last 24 hours) at 07/22/17 2202  Last data filed at 07/22/17 2000   Gross per 24 hour   Intake              820 ml   Output              820 ml   Net                0 ml      Physical Exam   Constitutional: He is oriented to person, place, and time. He appears well-developed and well-nourished. No distress.   HENT:   Head: Normocephalic and atraumatic.   Right Ear: External ear normal.   Left Ear: External ear normal.   Eyes: Conjunctivae and EOM are normal. Pupils are equal, round, and reactive to light.   Neck: Normal range of motion. Neck supple.   Cardiovascular: Normal rate and regular rhythm.    Murmur heard.   Systolic murmur is present   Pulmonary/Chest: Effort normal and breath sounds normal. He has no wheezes. He has no rales.   Abdominal: Soft. Bowel sounds are  normal. There is no tenderness. There is no guarding.   Musculoskeletal: Normal range of motion.   Neurological: He is alert and oriented to person, place, and time. No cranial nerve deficit.   Skin: Skin is warm. He is not diaphoretic.   Psychiatric: He has a normal mood and affect. His behavior is normal. Judgment and thought content normal.   Vitals reviewed.      Significant Labs:   BMP:   Recent Labs  Lab 07/22/17  1944   *   *   K 4.4   CL 92*   CO2 21*   BUN 21   CREATININE 1.1   CALCIUM 8.4*     CBC:   Recent Labs  Lab 07/21/17  0414 07/22/17  0315   WBC 15.27* 19.46*   HGB 9.7* 10.5*   HCT 29.7* 30.2*   * 362*       Significant Imaging: I have reviewed all pertinent imaging results/findings within the past 24 hours.    Assessment/Plan:      Hypotension due to drugs    DC lisinopril  Lower norvasc 2.5 mg with holding parameters  Continue metoprolol           Hyponatremia    On admit sodium 134, now down to 121  Placed on fluid restriction - now dc'd  Urine sodium and osmolarity   NS IVF -dc'd with lowering NA  DC'd lasix  Thyroid labs in normal range  Spoke with neuro as he has some wax and wane neuro changes and unsteady gait, recommend MRI to eval pituitary/embolic infarcts  Cortisol and cosyntropin test incorrectly done.   MRI pending               Urinary retention    Start flomax and monitor  Likely undiagnosed BPH           Bacteremia due to Staphylococcus aureus    On 7.11.17 ,Source infected mitral valves,endocarditis,,on Vanc.repeat blood culture on 7.13.17 no growth sofar.placed picc line 7/17/2017.follow vanc. Through level.          Diverticulitis of large intestine without bleeding    Appears to be resolving as no further abdominal pain or NVD. Had formed BM. Need repeat colonoscopy as out patient after infectious process resolve.  Off cipro and flagyl         Depression    zoloft dc'd due to SSRi and hyponatremia  Continue wellbutrin           History of colon cancer    Pt  had colonoscopy 2 years ago and reported unremarkable. Encourage follow up with GI.          Hypothyroidism    Continue Synthroid.  Lab Results   Component Value Date    TSH 3.269 07/19/2017             Chronic kidney disease, stage III (moderate)    Cr=1.8, BUN 23.GFR 44. Baseline Cr ~1.5. Continue IV fluids. Avoid nephrotoxic medications.        Hyperlipidemia    Pt not compliant with current statin regiment. Continue and encourage daily statin intake.   Lab Results   Component Value Date    LDLCALC 65.2 06/17/2017             Essential hypertension              S/P AVR    See above, endocarditis.        S/P CABG (coronary artery bypass graft)    No acute issues. Continue ASA and statin.          * Endocarditis of mitral valve      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 Per ID documentation 12/2015 [facility] KHALIDA showed vegetation on aortic and mitral valve.     Per their discharge summary, cultures had cleared at time of discharge on 11/27.  1/25/16 TTE shows mobile mass on mitral valve and resolved vegetation on aortic valve. Patient was treated with IV rocephin x 6 weeks then placed on suppressive antibiotic Keflex and plan was for repeat 2 D echo in Jan 2017 however patient have missed appointment with ID.he was also non compliant with Keflex.CT   had no plan for intervention, Obtained 2 D echo,show same seize MV regurgitation, inflammatory markers elevated, Consulted ID,on vanc.  cardiology planing for KHALIDA today,but patient refusing at this time..patient has been informed no guarranty for resolution of infection.he is non compliant with medical treatment, picc line placed.need 6 weeks IV Abx  -awaiting for son to respond with plan, hire help at home     Dc held due to hyponatremia - work up in progress  Pt agreed to KHALIDA and plan next week          VTE Risk Mitigation         Ordered     heparin (porcine) injection 5,000 Units  Every 8 hours      Route:  Subcutaneous        07/14/17 0640     Place WALDO hose  Until discontinued      07/13/17 1843     Place sequential compression device  Until discontinued      07/13/17 1843          Meeta Swan MD  Department of Hospital Medicine   [facility]

## 2017-07-23 NOTE — ASSESSMENT & PLAN NOTE
On admit sodium 134, now down to 121  Placed on fluid restriction - now dc'd  Urine sodium and osmolarity   NS IVF -dc'd with lowering NA  DC'd lasix  Thyroid labs in normal range  Spoke with neuro as he has some wax and wane neuro changes and unsteady gait, recommend MRI to eval pituitary/embolic infarcts  Cortisol and cosyntropin test incorrectly done.   MRI pending

## 2017-07-23 NOTE — PLAN OF CARE
Problem: Patient Care Overview  Goal: Plan of Care Review  Outcome: Ongoing (interventions implemented as appropriate)  Pt w/plans for KHALIDA Mon, pt waiting for family to fax copy of mitral valve card w/serial number, POA faxed and placed in pt's chart,  IV abx x6 wks once d/c'd, JAMES PICC in place, IV abx per order, family at bedside assisting w/care, contact isolation maintained, safety maintained, bed low locked and in position, will cont plan of care

## 2017-07-23 NOTE — ASSESSMENT & PLAN NOTE
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 Per ID documentation 12/2015 [facility] KHALIDA showed vegetation on aortic and mitral valve.     Per their discharge summary, cultures had cleared at time of discharge on 11/27.  1/25/16 TTE shows mobile mass on mitral valve and resolved vegetation on aortic valve. Patient was treated with IV rocephin x 6 weeks then placed on suppressive antibiotic Keflex and plan was for repeat 2 D echo in Jan 2017 however patient have missed appointment with ID.he was also non compliant with Keflex.CT   had no plan for intervention, Obtained 2 D echo,show same seize MV regurgitation, inflammatory markers elevated, Consulted ID,on vanc.  cardiology planing for KHALIDA today,but patient refusing at this time..patient has been informed no guarranty for resolution of infection.he is non compliant with medical treatment, picc line placed.need 6 weeks IV Abx  -awaiting for son to respond with plan, hire help at home     Dc held due to hyponatremia - work up in progress  Pt agreed to KHALIDA and plan next week

## 2017-07-23 NOTE — PROGRESS NOTES
MRI phoned to inquire if pt's family has brought his mitral valve card w/serial number, informed MRI that family has not given card to nurse

## 2017-07-23 NOTE — SUBJECTIVE & OBJECTIVE
Interval History: pt reports bad night and morning because he has to be here    Review of Systems   Constitutional: Positive for fatigue. Negative for appetite change and chills.   HENT: Negative for congestion, ear discharge and rhinorrhea.    Eyes: Negative for pain, redness and itching.   Respiratory: Negative for cough, chest tightness, shortness of breath, wheezing and stridor.    Cardiovascular: Negative for chest pain and leg swelling.   Gastrointestinal: Negative for abdominal pain, constipation, diarrhea, nausea and vomiting.   Endocrine: Negative for cold intolerance and polydipsia.   Genitourinary: Negative for dysuria and hematuria.   Musculoskeletal: Negative for arthralgias, joint swelling and myalgias.   Skin: Negative for color change and pallor.   Neurological: Negative for syncope, light-headedness and headaches.   Psychiatric/Behavioral: Positive for sleep disturbance. Negative for confusion and hallucinations. The patient is not nervous/anxious.      Objective:     Vital Signs (Most Recent):  Temp: 97.8 °F (36.6 °C) (07/22/17 1910)  Pulse: 91 (07/22/17 2000)  Resp: 18 (07/22/17 1910)  BP: 122/60 (07/22/17 1910)  SpO2: 99 % (07/22/17 1910) Vital Signs (24h Range):  Temp:  [97.5 °F (36.4 °C)-98.6 °F (37 °C)] 97.8 °F (36.6 °C)  Pulse:  [84-99] 91  Resp:  [18-22] 18  SpO2:  [94 %-100 %] 99 %  BP: (119-169)/(60-89) 122/60     Weight: 101.8 kg (224 lb 8 oz)  Body mass index is 33.64 kg/m².    Intake/Output Summary (Last 24 hours) at 07/22/17 2202  Last data filed at 07/22/17 2000   Gross per 24 hour   Intake              820 ml   Output              820 ml   Net                0 ml      Physical Exam   Constitutional: He is oriented to person, place, and time. He appears well-developed and well-nourished. No distress.   HENT:   Head: Normocephalic and atraumatic.   Right Ear: External ear normal.   Left Ear: External ear normal.   Eyes: Conjunctivae and EOM are normal. Pupils are equal, round, and  reactive to light.   Neck: Normal range of motion. Neck supple.   Cardiovascular: Normal rate and regular rhythm.    Murmur heard.   Systolic murmur is present   Pulmonary/Chest: Effort normal and breath sounds normal. He has no wheezes. He has no rales.   Abdominal: Soft. Bowel sounds are normal. There is no tenderness. There is no guarding.   Musculoskeletal: Normal range of motion.   Neurological: He is alert and oriented to person, place, and time. No cranial nerve deficit.   Skin: Skin is warm. He is not diaphoretic.   Psychiatric: He has a normal mood and affect. His behavior is normal. Judgment and thought content normal.   Vitals reviewed.      Significant Labs:   BMP:   Recent Labs  Lab 07/22/17  1944   *   *   K 4.4   CL 92*   CO2 21*   BUN 21   CREATININE 1.1   CALCIUM 8.4*     CBC:   Recent Labs  Lab 07/21/17  0414 07/22/17  0315   WBC 15.27* 19.46*   HGB 9.7* 10.5*   HCT 29.7* 30.2*   * 362*       Significant Imaging: I have reviewed all pertinent imaging results/findings within the past 24 hours.

## 2017-07-23 NOTE — PLAN OF CARE
Problem: Fall Risk (Adult)  Goal: Identify Related Risk Factors and Signs and Symptoms  Related risk factors and signs and symptoms are identified upon initiation of Human Response Clinical Practice Guideline (CPG)   Outcome: Ongoing (interventions implemented as appropriate)   07/23/17 0445   Fall Risk   Related Risk Factors (Fall Risk) environment unfamiliar;gait/mobility problems;confusion/agitation   Signs and Symptoms (Fall Risk) presence of risk factors     Goal: Absence of Falls  Patient will demonstrate the desired outcomes by discharge/transition of care.   Outcome: Ongoing (interventions implemented as appropriate)   07/23/17 0445   Fall Risk (Adult)   Absence of Falls making progress toward outcome       Problem: Infection, Risk/Actual (Adult)  Goal: Identify Related Risk Factors and Signs and Symptoms  Related risk factors and signs and symptoms are identified upon initiation of Human Response Clinical Practice Guideline (CPG)   Outcome: Ongoing (interventions implemented as appropriate)   07/23/17 0445   Infection, Risk/Actual   Related Risk Factors (Infection, Risk/Actual) chronic illness/condition   Signs and Symptoms (Infection, Risk/Actual) pain;lab value changes;weakness     Goal: Infection Prevention/Resolution  Patient will demonstrate the desired outcomes by discharge/transition of care.   Outcome: Ongoing (interventions implemented as appropriate)   07/23/17 0445   Infection, Risk/Actual (Adult)   Infection Prevention/Resolution making progress toward outcome       Problem: Sepsis/Septic Shock (Adult)  Goal: Signs and Symptoms of Listed Potential Problems Will be Absent, Minimized or Managed (Sepsis/Septic Shock)  Signs and symptoms of listed potential problems will be absent, minimized or managed by discharge/transition of care (reference Sepsis/Septic Shock (Adult) CPG).   Outcome: Ongoing (interventions implemented as appropriate)   07/23/17 0445   Sepsis/Septic Shock   Problems Assessed  (Sepsis) all   Problems Present (Sepsis) none

## 2017-07-24 ENCOUNTER — SURGERY (OUTPATIENT)
Age: 77
End: 2017-07-24

## 2017-07-24 ENCOUNTER — ANESTHESIA (OUTPATIENT)
Dept: CARDIOLOGY | Facility: HOSPITAL | Age: 77
DRG: 288 | End: 2017-07-24
Payer: COMMERCIAL

## 2017-07-24 ENCOUNTER — ANESTHESIA EVENT (OUTPATIENT)
Dept: CARDIOLOGY | Facility: HOSPITAL | Age: 77
DRG: 288 | End: 2017-07-24
Payer: COMMERCIAL

## 2017-07-24 PROBLEM — R78.81 POSITIVE BLOOD CULTURES: Status: ACTIVE | Noted: 2017-07-24

## 2017-07-24 LAB
ANION GAP SERPL CALC-SCNC: 5 MMOL/L
ANION GAP SERPL CALC-SCNC: 8 MMOL/L
ANION GAP SERPL CALC-SCNC: 8 MMOL/L
AORTIC VALVE REGURGITATION: ABNORMAL
AORTIC VALVE STENOSIS: ABNORMAL
BASOPHILS # BLD AUTO: 0.04 K/UL
BASOPHILS NFR BLD: 0.2 %
BUN SERPL-MCNC: 21 MG/DL
BUN SERPL-MCNC: 21 MG/DL
BUN SERPL-MCNC: 24 MG/DL
CALCIUM SERPL-MCNC: 8.8 MG/DL
CALCIUM SERPL-MCNC: 9 MG/DL
CALCIUM SERPL-MCNC: 9.4 MG/DL
CHLORIDE SERPL-SCNC: 90 MMOL/L
CHLORIDE SERPL-SCNC: 92 MMOL/L
CHLORIDE SERPL-SCNC: 93 MMOL/L
CO2 SERPL-SCNC: 23 MMOL/L
CO2 SERPL-SCNC: 25 MMOL/L
CO2 SERPL-SCNC: 28 MMOL/L
CREAT SERPL-MCNC: 1.1 MG/DL
CREAT SERPL-MCNC: 1.2 MG/DL
CREAT SERPL-MCNC: 1.3 MG/DL
DIFFERENTIAL METHOD: ABNORMAL
EOSINOPHIL # BLD AUTO: 0.7 K/UL
EOSINOPHIL NFR BLD: 3.5 %
ERYTHROCYTE [DISTWIDTH] IN BLOOD BY AUTOMATED COUNT: 15.3 %
EST. GFR  (AFRICAN AMERICAN): >60 ML/MIN/1.73 M^2
EST. GFR  (NON AFRICAN AMERICAN): 52 ML/MIN/1.73 M^2
EST. GFR  (NON AFRICAN AMERICAN): 58 ML/MIN/1.73 M^2
EST. GFR  (NON AFRICAN AMERICAN): >60 ML/MIN/1.73 M^2
GLUCOSE SERPL-MCNC: 120 MG/DL
GLUCOSE SERPL-MCNC: 120 MG/DL
GLUCOSE SERPL-MCNC: 155 MG/DL
HCT VFR BLD AUTO: 27.6 %
HGB BLD-MCNC: 9.1 G/DL
LYMPHOCYTES # BLD AUTO: 1.9 K/UL
LYMPHOCYTES NFR BLD: 9.7 %
MCH RBC QN AUTO: 26.7 PG
MCHC RBC AUTO-ENTMCNC: 33 G/DL
MCV RBC AUTO: 81 FL
MITRAL VALVE MOBILITY: NORMAL
MITRAL VALVE REGURGITATION: ABNORMAL
MONOCYTES # BLD AUTO: 1.8 K/UL
MONOCYTES NFR BLD: 9.1 %
NEUTROPHILS # BLD AUTO: 14.8 K/UL
NEUTROPHILS NFR BLD: 77.5 %
PLATELET # BLD AUTO: 306 K/UL
PMV BLD AUTO: 9.6 FL
POTASSIUM SERPL-SCNC: 4.3 MMOL/L
POTASSIUM SERPL-SCNC: 4.5 MMOL/L
POTASSIUM SERPL-SCNC: 5.4 MMOL/L
RBC # BLD AUTO: 3.41 M/UL
RETIRED EF AND QEF - SEE NOTES: 50 (ref 55–65)
SODIUM SERPL-SCNC: 123 MMOL/L
SODIUM SERPL-SCNC: 124 MMOL/L
SODIUM SERPL-SCNC: 125 MMOL/L
TRICUSPID VALVE REGURGITATION: ABNORMAL
VANCOMYCIN TROUGH SERPL-MCNC: 12.9 UG/ML
WBC # BLD AUTO: 19.18 K/UL

## 2017-07-24 PROCEDURE — A9585 GADOBUTROL INJECTION: HCPCS | Performed by: HOSPITALIST

## 2017-07-24 PROCEDURE — D9220A PRA ANESTHESIA: Mod: CRNA,,, | Performed by: NURSE ANESTHETIST, CERTIFIED REGISTERED

## 2017-07-24 PROCEDURE — 63600175 PHARM REV CODE 636 W HCPCS: Performed by: INTERNAL MEDICINE

## 2017-07-24 PROCEDURE — 80048 BASIC METABOLIC PNL TOTAL CA: CPT | Mod: 91

## 2017-07-24 PROCEDURE — 63600175 PHARM REV CODE 636 W HCPCS: Performed by: HOSPITALIST

## 2017-07-24 PROCEDURE — 25000003 PHARM REV CODE 250: Performed by: HOSPITALIST

## 2017-07-24 PROCEDURE — 25500020 PHARM REV CODE 255: Performed by: HOSPITALIST

## 2017-07-24 PROCEDURE — 85025 COMPLETE CBC W/AUTO DIFF WBC: CPT

## 2017-07-24 PROCEDURE — 25000003 PHARM REV CODE 250: Performed by: INTERNAL MEDICINE

## 2017-07-24 PROCEDURE — 25000003 PHARM REV CODE 250: Performed by: NURSE ANESTHETIST, CERTIFIED REGISTERED

## 2017-07-24 PROCEDURE — 80202 ASSAY OF VANCOMYCIN: CPT

## 2017-07-24 PROCEDURE — 93312 ECHO TRANSESOPHAGEAL: CPT | Mod: 26,,, | Performed by: INTERNAL MEDICINE

## 2017-07-24 PROCEDURE — 93320 DOPPLER ECHO COMPLETE: CPT | Mod: 26,,, | Performed by: INTERNAL MEDICINE

## 2017-07-24 PROCEDURE — 37000008 HC ANESTHESIA 1ST 15 MINUTES: Performed by: INTERNAL MEDICINE

## 2017-07-24 PROCEDURE — 36415 COLL VENOUS BLD VENIPUNCTURE: CPT

## 2017-07-24 PROCEDURE — 37000009 HC ANESTHESIA EA ADD 15 MINS: Performed by: INTERNAL MEDICINE

## 2017-07-24 PROCEDURE — D9220A PRA ANESTHESIA: Mod: ANES,,, | Performed by: ANESTHESIOLOGY

## 2017-07-24 PROCEDURE — 93312 ECHO TRANSESOPHAGEAL: CPT

## 2017-07-24 PROCEDURE — 12000002 HC ACUTE/MED SURGE SEMI-PRIVATE ROOM

## 2017-07-24 RX ORDER — FUROSEMIDE 10 MG/ML
40 INJECTION INTRAMUSCULAR; INTRAVENOUS ONCE
Status: COMPLETED | OUTPATIENT
Start: 2017-07-24 | End: 2017-07-24

## 2017-07-24 RX ORDER — PANTOPRAZOLE SODIUM 40 MG/1
40 TABLET, DELAYED RELEASE ORAL DAILY
Status: DISCONTINUED | OUTPATIENT
Start: 2017-07-24 | End: 2017-07-25 | Stop reason: HOSPADM

## 2017-07-24 RX ORDER — METOPROLOL SUCCINATE 25 MG/1
25 TABLET, EXTENDED RELEASE ORAL DAILY
Status: DISCONTINUED | OUTPATIENT
Start: 2017-07-24 | End: 2017-07-25 | Stop reason: HOSPADM

## 2017-07-24 RX ORDER — BUPROPION HYDROCHLORIDE 150 MG/1
300 TABLET ORAL DAILY
Status: DISCONTINUED | OUTPATIENT
Start: 2017-07-24 | End: 2017-07-25 | Stop reason: HOSPADM

## 2017-07-24 RX ORDER — GADOBUTROL 604.72 MG/ML
10 INJECTION INTRAVENOUS
Status: COMPLETED | OUTPATIENT
Start: 2017-07-24 | End: 2017-07-24

## 2017-07-24 RX ORDER — SODIUM CHLORIDE, SODIUM LACTATE, POTASSIUM CHLORIDE, CALCIUM CHLORIDE 600; 310; 30; 20 MG/100ML; MG/100ML; MG/100ML; MG/100ML
INJECTION, SOLUTION INTRAVENOUS CONTINUOUS PRN
Status: DISCONTINUED | OUTPATIENT
Start: 2017-07-24 | End: 2017-07-24

## 2017-07-24 RX ORDER — ETOMIDATE 2 MG/ML
INJECTION INTRAVENOUS
Status: DISCONTINUED | OUTPATIENT
Start: 2017-07-24 | End: 2017-07-24

## 2017-07-24 RX ORDER — TAMSULOSIN HYDROCHLORIDE 0.4 MG/1
0.4 CAPSULE ORAL DAILY
Status: DISCONTINUED | OUTPATIENT
Start: 2017-07-24 | End: 2017-07-25 | Stop reason: HOSPADM

## 2017-07-24 RX ORDER — LIDOCAINE HCL/PF 100 MG/5ML
SYRINGE (ML) INTRAVENOUS
Status: DISCONTINUED | OUTPATIENT
Start: 2017-07-24 | End: 2017-07-24

## 2017-07-24 RX ADMIN — VANCOMYCIN HYDROCHLORIDE 1250 MG: 1 INJECTION, POWDER, LYOPHILIZED, FOR SOLUTION INTRAVENOUS at 03:07

## 2017-07-24 RX ADMIN — Medication 10 ML: at 01:07

## 2017-07-24 RX ADMIN — SODIUM CHLORIDE, SODIUM LACTATE, POTASSIUM CHLORIDE, AND CALCIUM CHLORIDE: .6; .31; .03; .02 INJECTION, SOLUTION INTRAVENOUS at 09:07

## 2017-07-24 RX ADMIN — HEPARIN SODIUM 5000 UNITS: 5000 INJECTION, SOLUTION INTRAVENOUS; SUBCUTANEOUS at 09:07

## 2017-07-24 RX ADMIN — ETOMIDATE 2 MG: 2 INJECTION, SOLUTION INTRAVENOUS at 09:07

## 2017-07-24 RX ADMIN — POLYETHYLENE GLYCOL 3350 17 G: 17 POWDER, FOR SOLUTION ORAL at 09:07

## 2017-07-24 RX ADMIN — LIDOCAINE HYDROCHLORIDE 50 MG: 20 INJECTION, SOLUTION INTRAVENOUS at 09:07

## 2017-07-24 RX ADMIN — Medication 10 ML: at 12:07

## 2017-07-24 RX ADMIN — Medication 10 ML: at 06:07

## 2017-07-24 RX ADMIN — FUROSEMIDE 40 MG: 10 INJECTION, SOLUTION INTRAMUSCULAR; INTRAVENOUS at 02:07

## 2017-07-24 RX ADMIN — MIRTAZAPINE 15 MG: 15 TABLET, FILM COATED ORAL at 09:07

## 2017-07-24 RX ADMIN — TAMSULOSIN HYDROCHLORIDE 0.4 MG: 0.4 CAPSULE ORAL at 01:07

## 2017-07-24 RX ADMIN — HEPARIN SODIUM 5000 UNITS: 5000 INJECTION, SOLUTION INTRAVENOUS; SUBCUTANEOUS at 02:07

## 2017-07-24 RX ADMIN — RAMELTEON 8 MG: 8 TABLET, FILM COATED ORAL at 09:07

## 2017-07-24 RX ADMIN — GADOBUTROL 10 ML: 604.72 INJECTION INTRAVENOUS at 07:07

## 2017-07-24 RX ADMIN — BUPROPION HYDROCHLORIDE 300 MG: 150 TABLET, FILM COATED, EXTENDED RELEASE ORAL at 01:07

## 2017-07-24 RX ADMIN — ETOMIDATE 8 MG: 2 INJECTION, SOLUTION INTRAVENOUS at 09:07

## 2017-07-24 RX ADMIN — PANTOPRAZOLE SODIUM 40 MG: 40 TABLET, DELAYED RELEASE ORAL at 01:07

## 2017-07-24 RX ADMIN — METOPROLOL SUCCINATE 25 MG: 25 TABLET, EXTENDED RELEASE ORAL at 02:07

## 2017-07-24 NOTE — PROGRESS NOTES
[facility] Hospital Medicine  Progress Note    Patient Name: Rolo Blanca  MRN: [id number]  Patient Class: IP- Inpatient   Admission Date: 7/13/2017  Length of Stay: 10 days  Attending Physician: Meeta Swan MD  Primary Care Provider: Scott Valadez MD        Subjective:     Principal Problem:Endocarditis of mitral valve    HPI:  The patient is a 78 y/o male with a significant history of colon cancer 2000 sp partial colon resection, hypertension, hyperlipidemia, TIAs, CAD s/p MI/CABG 2014, AVR- bioprosthetic valve and AVR and MV endocarditis 2015 now on suppressive antibiotics (currently followed by ID -[facility] Adarsh Howell) who was told by [facility] to return to ED for 7/11positive blood cultures 2/4 (one bottle GPC chain strep, one bottle GPC cluster staph). Patient then transferred to [facility] for further evaluation. Patient initially went to [place] ED on 7/11 with complaints of abdominal pain, nausea, and vomiting which started 3 days ago then found to have on CT abdomen  sigmoid diverticulitis then treated with cipro and metronidazole. Patient reported today abdominal pain and vomiting resolved and had a normal formed bowel movement. Last episode of nausea and vomiting was last night. Also c/o intermittent dizziness upon position changes but denies chest pain or  shortness of breath. He denies fever, chills, URI symptoms and headaches.     Family concern about suppressive antibiotic Keflex for two years after endocarditis episode. Patient missed ID appointment as plan was for repeat 2 D echo in Jan 2017 to evaluate for resolved vegetation on MV).       Hospital Course:  The patient is a 78 y/o male with a significant history of colon cancer 2000 sp partial colon resection, hypertension, hyperlipidemia, TIAs, CAD s/p MI/CABG 2014, AVR- bioprosthetic valve and AVR and MV endocarditis 2015 now on suppressive antibiotics (currently followed by ID -[facility] Adarsh Howell) who was told by [facility] to  return to ED for 7/11positive blood cultures 2/4 (one bottle GPC chain strep, one bottle GPC cluster staph). Patient then transferred to [facility] for further evaluation. Patient initially went to [facility] ED on 7/11 with complaints of abdominal pain, nausea, and vomiting which started 3 days ago then found to have on CT abdomen  sigmoid diverticulitis then treated with cipro and metronidazole. Patient had some  abdominal pain and vomiting resolved and had a normal formed bowel movement. On cipro and flagyl.    - MRSA and strep viridance on blood culture on 7.11.17.   - repeat blood culture on 7.13.17 show no growth   - echo 7/14/2017 show persistent mitral vegetation,same size as before, AV is stable with bioprosthesis     -Family concern about suppressive antibiotic Keflex for two years after endocarditis episode.he was non compliant with Keflex. Patient missed ID appointment as plan was for repeat 2 D echo in Jan 2017 to evaluate for resolved vegetation on MV.  -picc line placed 7/17/2017  - plan for 6 weeks IV vancomycin  - ID to follow. Son concerned about patient not able to care for himself and do antibiotics. Currently looking for someone to hire and assist in the home vs possible SNF.    -Urinary retention and h/o increased frequency, started flomax and retention resolved  -dc held due to drop in sodium and hypotension SBP 80s - BP improved ad sodium dropped.    Fluid restriction dc'd and NS IVF given, pt reports not taking on much of diet. Sodium remains low. Cosyntropin test in am. MRI in am. Pt agreed to KHALIDA and spoke with cardiology to do next week.     MRI pending verification of valve with radiology.     Pt now wants placement to get antibiotics and be monitored, he's extremely anxious about going home       Interval History: *anxious about going home and wants placement, if IV ab 6 weeks consider LTAC    Review of Systems   Constitutional: Positive for fatigue. Negative for appetite change and chills.    HENT: Negative for congestion, ear discharge and rhinorrhea.    Eyes: Negative for pain, redness and itching.   Respiratory: Negative for cough, chest tightness, shortness of breath, wheezing and stridor.    Cardiovascular: Negative for chest pain and leg swelling.   Gastrointestinal: Negative for abdominal pain, constipation, diarrhea, nausea and vomiting.   Endocrine: Negative for cold intolerance and polydipsia.   Genitourinary: Negative for dysuria and hematuria.   Musculoskeletal: Negative for arthralgias, joint swelling and myalgias.   Skin: Negative for color change and pallor.   Neurological: Negative for syncope, light-headedness and headaches.   Psychiatric/Behavioral: Positive for sleep disturbance. Negative for confusion and hallucinations. The patient is not nervous/anxious.      Objective:     Vital Signs (Most Recent):  Temp: 98.1 °F (36.7 °C) (07/23/17 1913)  Pulse: 76 (07/23/17 2000)  Resp: 18 (07/23/17 1913)  BP: (!) 119/58 (07/23/17 1913)  SpO2: 97 % (07/23/17 1913) Vital Signs (24h Range):  Temp:  [97.6 °F (36.4 °C)-99.1 °F (37.3 °C)] 98.1 °F (36.7 °C)  Pulse:  [76-96] 76  Resp:  [18] 18  SpO2:  [96 %-100 %] 97 %  BP: (119-147)/(58-77) 119/58     Weight: 102.3 kg (225 lb 9.6 oz)  Body mass index is 33.8 kg/m².    Intake/Output Summary (Last 24 hours) at 07/23/17 2042  Last data filed at 07/23/17 2000   Gross per 24 hour   Intake              730 ml   Output              975 ml   Net             -245 ml      Physical Exam   Constitutional: He is oriented to person, place, and time. He appears well-developed and well-nourished. No distress.   HENT:   Head: Normocephalic and atraumatic.   Right Ear: External ear normal.   Left Ear: External ear normal.   Eyes: Conjunctivae and EOM are normal. Pupils are equal, round, and reactive to light.   Neck: Normal range of motion. Neck supple.   Cardiovascular: Normal rate and regular rhythm.    Murmur heard.   Systolic murmur is present   Pulmonary/Chest:  Effort normal and breath sounds normal. He has no wheezes. He has no rales.   Abdominal: Soft. Bowel sounds are normal. There is no tenderness. There is no guarding.   Musculoskeletal: Normal range of motion.   Neurological: He is alert and oriented to person, place, and time. No cranial nerve deficit.   Skin: Skin is warm. He is not diaphoretic.   Psychiatric: He has a normal mood and affect. His behavior is normal. Judgment and thought content normal.   Vitals reviewed.      Significant Labs:   Blood Culture: No results for input(s): LABBLOO in the last 48 hours.  BMP:   Recent Labs  Lab 07/23/17  1719   *   *   K 4.8   CL 91*   CO2 24   BUN 25*   CREATININE 1.2   CALCIUM 8.7     CBC:   Recent Labs  Lab 07/22/17  0315 07/23/17  0519   WBC 19.46* 18.13*   HGB 10.5* 9.3*   HCT 30.2* 28.0*   * 310     POCT Glucose: No results for input(s): POCTGLUCOSE in the last 48 hours.    Significant Imaging: I have reviewed all pertinent imaging results/findings within the past 24 hours.    Assessment/Plan:      Hypotension due to drugs    DC lisinopril  Lower norvasc 2.5 mg with holding parameters  Continue metoprolol           Hyponatremia    On admit sodium 134, now down to 121  Placed on fluid restriction - now dc'd  Urine sodium and osmolarity   NS IVF -dc'd with lowering NA  DC'd lasix  Thyroid labs in normal range  Spoke with neuro as he has some wax and wane neuro changes and unsteady gait, recommend MRI to eval pituitary/embolic infarcts  Cortisol and cosyntropin test incorrectly done.   MRI pending               Urinary retention    Start flomax and monitor  Likely undiagnosed BPH           Bacteremia due to Staphylococcus aureus    On 7.11.17 ,Source infected mitral valves,endocarditis,,on Vanc.repeat blood culture on 7.13.17 no growth sofar.placed picc line 7/17/2017.follow vanc. Through level.          Diverticulitis of large intestine without bleeding    Appears to be resolving as no further  abdominal pain or NVD. Had formed BM. Need repeat colonoscopy as out patient after infectious process resolve.  Off cipro and flagyl         Depression    zoloft dc'd due to SSRi and hyponatremia  Continue wellbutrin           History of colon cancer    Pt had colonoscopy 2 years ago and reported unremarkable. Encourage follow up with GI.          Hypothyroidism    Continue Synthroid.  Lab Results   Component Value Date    TSH 3.269 07/19/2017             Chronic kidney disease, stage III (moderate)    Cr=1.8, BUN 23.GFR 44. Baseline Cr ~1.5. Continue IV fluids. Avoid nephrotoxic medications.        Hyperlipidemia    Pt not compliant with current statin regiment. Continue and encourage daily statin intake.   Lab Results   Component Value Date    LDLCALC 65.2 06/17/2017             Essential hypertension              S/P AVR    See above, endocarditis.        S/P CABG (coronary artery bypass graft)    No acute issues. Continue ASA and statin.          * Endocarditis of mitral valve      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 Per ID documentation 12/2015 [facility] KHALIDA showed vegetation on aortic and mitral valve.     Per their discharge summary, cultures had cleared at time of discharge on 11/27.  1/25/16 TTE shows mobile mass on mitral valve and resolved vegetation on aortic valve. Patient was treated with IV rocephin x 6 weeks then placed on suppressive antibiotic Keflex and plan was for repeat 2 D echo in Jan 2017 however patient have missed appointment with ID.he was also non compliant with Keflex.CT   had no plan for intervention, Obtained 2 D echo,show same seize MV regurgitation, inflammatory markers elevated, Consulted ID,on vanc.  cardiology planing for KHALIDA today,but patient refusing at this time..patient has been informed no guarranty for resolution of infection.he is non compliant with medical treatment, picc line placed.need 6 weeks IV  Abx  -awaiting for son to respond with plan, hire help at home     Dc held due to hyponatremia - work up in progress  Pt agreed to KHALIDA and plan next week          VTE Risk Mitigation         Ordered     heparin (porcine) injection 5,000 Units  Every 8 hours     Route:  Subcutaneous        07/14/17 0640     Place WALDO hose  Until discontinued      07/13/17 1843     Place sequential compression device  Until discontinued      07/13/17 1843          Meeta Swan MD  Department of Hospital Medicine   [facility]

## 2017-07-24 NOTE — TRANSFER OF CARE
"Anesthesia Transfer of Care Note    Patient: Rolo Blanca    Procedure(s) Performed: Procedure(s) (LRB):  TRANSESOPHAGEAL ECHOCARDIOGRAM (KHALIDA) (N/A)    Patient location: Cath Lab    Anesthesia Type: general    Transport from OR: Transported from OR on 2-3 L/min O2 by NC with adequate spontaneous ventilation    Post pain: adequate analgesia    Post assessment: no apparent anesthetic complications    Post vital signs: stable    Level of consciousness: awake, alert and oriented    Nausea/Vomiting: no nausea/vomiting    Complications: none    Transfer of care protocol was followed      Last vitals:   Visit Vitals  /65 (BP Location: Right arm, Patient Position: Lying, BP Method: Automatic)   Pulse 88   Temp 36 °C (96.8 °F) (Skin)   Resp 18   Ht 5' 8.5" (1.74 m)   Wt 102.3 kg (225 lb 8.5 oz)   SpO2 97%   BMI 33.79 kg/m²     "

## 2017-07-24 NOTE — PLAN OF CARE
07/24/17 1026   Discharge Reassessment   Assessment Type Discharge Planning Reassessment   Patient discussed DC with Dr Swan.  He now request to go to a facility for abx stating that he doesn't know if he would have help at home as his wife suffers from dementia and his son and daughter live out of town.  Per MDT's this am LTAC was discussed.  Patient choice form completed by granddaughter.  Copy to patient and original to patients chart.  TN sent clinicals via [facility] care to [facility].  Awaiting return response.       1100:  Per Zhou pt under review at:  [facility] and [facility].   Spoke with Gloria at [facility] who plans to visit patient and submit for auth if patient agrees.                  1130:  Call received from Alla at [facility] who plans to visit patient.    1145:  Alla at Bedside.  Patient stated he does wish to go to [facility].  Fmy plans to tour facility later today.    1230:  Spoke with Alla who stated that family asked her to pursue auth for transfer.  Awaiting results.

## 2017-07-24 NOTE — SUBJECTIVE & OBJECTIVE
Interval History: *anxious about going home and wants placement, if IV ab 6 weeks consider LTAC    Review of Systems   Constitutional: Positive for fatigue. Negative for appetite change and chills.   HENT: Negative for congestion, ear discharge and rhinorrhea.    Eyes: Negative for pain, redness and itching.   Respiratory: Negative for cough, chest tightness, shortness of breath, wheezing and stridor.    Cardiovascular: Negative for chest pain and leg swelling.   Gastrointestinal: Negative for abdominal pain, constipation, diarrhea, nausea and vomiting.   Endocrine: Negative for cold intolerance and polydipsia.   Genitourinary: Negative for dysuria and hematuria.   Musculoskeletal: Negative for arthralgias, joint swelling and myalgias.   Skin: Negative for color change and pallor.   Neurological: Negative for syncope, light-headedness and headaches.   Psychiatric/Behavioral: Positive for sleep disturbance. Negative for confusion and hallucinations. The patient is not nervous/anxious.      Objective:     Vital Signs (Most Recent):  Temp: 98.1 °F (36.7 °C) (07/23/17 1913)  Pulse: 76 (07/23/17 2000)  Resp: 18 (07/23/17 1913)  BP: (!) 119/58 (07/23/17 1913)  SpO2: 97 % (07/23/17 1913) Vital Signs (24h Range):  Temp:  [97.6 °F (36.4 °C)-99.1 °F (37.3 °C)] 98.1 °F (36.7 °C)  Pulse:  [76-96] 76  Resp:  [18] 18  SpO2:  [96 %-100 %] 97 %  BP: (119-147)/(58-77) 119/58     Weight: 102.3 kg (225 lb 9.6 oz)  Body mass index is 33.8 kg/m².    Intake/Output Summary (Last 24 hours) at 07/23/17 2042  Last data filed at 07/23/17 2000   Gross per 24 hour   Intake              730 ml   Output              975 ml   Net             -245 ml      Physical Exam   Constitutional: He is oriented to person, place, and time. He appears well-developed and well-nourished. No distress.   HENT:   Head: Normocephalic and atraumatic.   Right Ear: External ear normal.   Left Ear: External ear normal.   Eyes: Conjunctivae and EOM are normal. Pupils are  equal, round, and reactive to light.   Neck: Normal range of motion. Neck supple.   Cardiovascular: Normal rate and regular rhythm.    Murmur heard.   Systolic murmur is present   Pulmonary/Chest: Effort normal and breath sounds normal. He has no wheezes. He has no rales.   Abdominal: Soft. Bowel sounds are normal. There is no tenderness. There is no guarding.   Musculoskeletal: Normal range of motion.   Neurological: He is alert and oriented to person, place, and time. No cranial nerve deficit.   Skin: Skin is warm. He is not diaphoretic.   Psychiatric: He has a normal mood and affect. His behavior is normal. Judgment and thought content normal.   Vitals reviewed.      Significant Labs:   Blood Culture: No results for input(s): LABBLOO in the last 48 hours.  BMP:   Recent Labs  Lab 07/23/17  1719   *   *   K 4.8   CL 91*   CO2 24   BUN 25*   CREATININE 1.2   CALCIUM 8.7     CBC:   Recent Labs  Lab 07/22/17  0315 07/23/17  0519   WBC 19.46* 18.13*   HGB 10.5* 9.3*   HCT 30.2* 28.0*   * 310     POCT Glucose: No results for input(s): POCTGLUCOSE in the last 48 hours.    Significant Imaging: I have reviewed all pertinent imaging results/findings within the past 24 hours.

## 2017-07-24 NOTE — NURSING
Telemetry box and monitor same number with good wave form noted. Checked with off going nurse. #[id number]

## 2017-07-24 NOTE — BRIEF OP NOTE
[facility]  Brief Operative Note     SUMMARY     Surgery Date: 7/24/2017     Surgeon(s) and Role:     * Jaxon Brower MD - Primary    Assisting Surgeon: None    Pre-op Diagnosis:  Endocarditis [I38]    Post-op Diagnosis:  Post-Op Diagnosis Codes:     * Endocarditis [I38]    Procedure(s) (LRB):  TRANSESOPHAGEAL ECHOCARDIOGRAM (KHALIDA) (N/A)    Anesthesia: Monitor Anesthesia Care    Description of the findings of the procedure: see below    Findings/Key Components:   Normal bivent size/fxn  MV vegetation 0.8 cm with associated mod-sev MR, similar to prior findings.  Bioprosthetic AV appears well seated and normally functioning.  DARIUS 1.0 cm by planimetry.  Trivial AI.  No diagnostic evidence of vegetation.  Mild-mod TR  No LA/TA thrombus visualized    Pt tolerated procedure well without complications    Imp:  Chronic MV vegetation findings  No diagnostic evidence of bioprosthetic AV vegetation  Full report to follow    Estimated Blood Loss: none         Specimens: None    Diet: per prior orders    Activity: ad danyell.

## 2017-07-24 NOTE — CONSULTS
Consult Note  Infectious Disease    Consult Requested By: Meeta Swan MD    Reason for Consult: staph aureus sepsis and strep viridans sepsis    SUBJECTIVE:     History of Present Illness:  Patient is a 77 y.o. male presents with  Abdominal pain. He was seen in ed on 7/11/17 with bilateral lower quadrant pain and was diagnosed with acute diverticulitis when a ct abdomen showed mild sigmoid stranding. He was rx with cipro and flagyl and did not improve. He now returns with decreasing abdominal pain and fatigue. He denies fever, sobar or chest pains. 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 He developed mitral regurgitation and several episodes of chf.he went home with hospice after he declined operative intervention. He sought a second opinion at [facility] and was declined as a surgical candidate. Repeat blood cultures on 12/8/15,6/28/16,10/3/16 were all negative. He was comitted to 6 weeks of iv rocephin and stopped iv abx 1/22/16. He was then placed on po keflex 500 mg q 12 hours and apparently has continued that modality but has missed the occasional dose. He was last seen by an id np at [facility] in October 2016. His tte on 12/19/15 and 1/25/16 continued to show a small mitral valve mass with mr- this was felt to have been sterilised and no surgery was undertaken.  His blood cultures form 7/11/17 now are positive for a staph aureus and a strep viridans. His repeat blood cultures taken while on antibiotics are negative form 7/13/17.    Past Medical History:   Diagnosis Date    MI, old 2014     Past Surgical History:   Procedure Laterality Date    AORTIC VALVE REPLACEMENT  2014    CARDIAC SURGERY      CARDIAC VALVE SURGERY      Bio AVR 2014    COLON SURGERY  2000    CORONARY ARTERY BYPASS GRAFT  2014    x2    TONSILLECTOMY       History reviewed. No pertinent family  history.  Social History   Substance Use Topics    Smoking status: Never Smoker    Smokeless tobacco: Never Used    Alcohol use Yes      Comment: 2-3 X'S A WEEK       Review of patient's allergies indicates:  No Known Allergies     Antibiotics     Start     Stop Route Frequency Ordered    07/21/17 0300  vancomycin (VANCOCIN) 1,250 mg in dextrose 5 % 250 mL IVPB      -- IV Every 24 hours (non-standard times) 07/20/17 1816          Review of Systems:  Constitutional: no fever or chills  Eyes: no visual changes  ENT: no nasal congestion or sore throat  Respiratory: no cough or shortness of breath  Cardiovascular: no chest pain or palpitations  Gastrointestinal: positive for abdominal pain  Genitourinary: no hematuria or dysuria  Hematologic/Lymphatic: no easy bruising or lymphadenopathy  Musculoskeletal: no arthralgias or myalgias  Neurological: no seizures or tremors    OBJECTIVE:     Vital Signs (Most Recent)  Temp: 98 °F (36.7 °C) (07/24/17 1011)  Pulse: 84 (07/24/17 1011)  Resp: 18 (07/24/17 1011)  BP: 129/64 (07/24/17 1011)  SpO2: (!) 94 % (07/24/17 1011)    Temperature Range Min/Max (Last 24H):  Temp:  [96.8 °F (36 °C)-99.1 °F (37.3 °C)]     Physical Exam:  General: well developed, well nourished  HENT: Head:normocephalic, atraumatic. Ears:not examined. Nose: Nares normal. Septum midline. Mucosa normal. No drainage or sinus tenderness., no discharge. Throat: lips, mucosa, and tongue normal; teeth and gums normal and no throat erythema.  Eyes: conjunctivae/corneas clear. PERRL.   Neck: supple, symmetrical, trachea midline, no JVD and thyroid not enlarged, symmetric, no tenderness/mass/nodules  Lungs:  clear to auscultation bilaterally and normal respiratory effort  Cardiovascular: Heart: loud psm lse 4/6. Chest Wall: no tenderness. Extremities: no cyanosis or edema, or clubbing. Pulses: 2+ and symmetric.  Abdomen/Rectal: Abdomen: soft, non-tender non-distented; bowel sounds normal; no masses,  no organomegaly.  Rectal: not examined  Skin: Skin color, texture, turgor normal. No rashes or lesions  Musculoskeletal:no clubbing, cyanosis  Lymph Nodes: No cervical or supraclavicular adenopathy    Laboratory:  CBC    Recent Labs  Lab 07/24/17  0340   WBC 19.18*   RBC 3.41*   HGB 9.1*   HCT 27.6*        BMP    Recent Labs  Lab 07/24/17  0903   CO2 28   BUN 21   CREATININE 1.2   CALCIUM 9.4     No results for input(s): COLORU, CLARITYU, SPECGRAV, PHUR, PROTEINUA, GLUCOSEU, BILIRUBINCON, BLOODU, WBCU, RBCU, BACTERIA, MUCUS, NITRITE, LEUKOCYTESUR, UROBILINOGEN, HYALINECASTS in the last 168 hours.  Microbiology Results (last 7 days)     ** No results found for the last 168 hours. **          Diagnostic Results:  Labs: Reviewed  X-Ray: Reviewed  Echo: Reviewed    ASSESSMENT/PLAN:     Active Hospital Problems    Diagnosis  POA    *Endocarditis of mitral valve [I05.8]  Yes    Positive blood cultures [R78.81]  Yes    Hyponatremia [E87.1]  Yes    Hypotension due to drugs [I95.2]  No    Urinary retention [R33.9]  Yes    Bacteremia due to Staphylococcus aureus [R78.81]  Yes    Diverticulitis of large intestine without bleeding [K57.32]  Yes    Depression [F32.9]  Yes    Chronic kidney disease, stage III (moderate) [N18.3]  Yes    Hypothyroidism [E03.9]  Yes    History of colon cancer [Z85.038]  Yes    Hyperlipidemia [E78.5]  Yes    S/P CABG (coronary artery bypass graft) [Z95.1]  Not Applicable    S/P AVR [Z95.2]  Not Applicable      Resolved Hospital Problems    Diagnosis Date Resolved POA    Positive blood cultures [R78.81] 07/18/2017 Yes       1. Staph aureus(mrsa) and strep salivarius in blood. He has a prosthetic aortic valve and has had ? Partially sterilised vegetation on mitral valve since 2015    He is apparently not a surgical candidate  He has not had staph aureus in blood before  Suggest   Sai  Repeat ct abdomen and pelvis to ensure no abscess etc- looks better  Iv vancomycin  Will likely need 6 weeks of iv  abx again, no guarantee of success   I cannot rule out contaminated cultures and have to rx as with prosthetic aortic valve and mitral vegetation  KHALIDA with good function of aortic valve prosthesis and small chronic vegetation on mitral valve  Dc date of vancomycin will be august 18th 2017

## 2017-07-24 NOTE — ANESTHESIA POSTPROCEDURE EVALUATION
"Anesthesia Post Evaluation    Patient: Rolo Blanca    Procedure(s) Performed: Procedure(s) (LRB):  TRANSESOPHAGEAL ECHOCARDIOGRAM (KHALIDA) (N/A)    Final Anesthesia Type: general  Patient location during evaluation: PACU  Patient participation: Yes- Able to Participate  Level of consciousness: awake and alert and oriented  Post-procedure vital signs: reviewed and stable  Pain management: adequate  Airway patency: patent  PONV status at discharge: No PONV  Anesthetic complications: no      Cardiovascular status: blood pressure returned to baseline and hemodynamically stable  Respiratory status: unassisted, spontaneous ventilation and room air  Hydration status: euvolemic  Follow-up not needed.        Visit Vitals  /64 (BP Location: Right arm, Patient Position: Lying, BP Method: Automatic)   Pulse 84   Temp 36.7 °C (98 °F) (Oral)   Resp 18   Ht 5' 8.5" (1.74 m)   Wt 102.3 kg (225 lb 8.5 oz)   SpO2 (!) 94%   BMI 33.79 kg/m²       Pain/Dru Score: Pain Assessment Performed: Yes (7/24/2017  7:55 AM)  Presence of Pain: denies (7/24/2017  7:55 AM)      "

## 2017-07-24 NOTE — NURSING
Inforomed Dr. Swan that patient is having BMP drawn every 4 hours, with no significant change noted. Sodium lab value is staying between 119-122. Md gave new order to change lab draw to every 12 hours

## 2017-07-24 NOTE — ASSESSMENT & PLAN NOTE
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 Per ID documentation 12/2015 [facility] KHALIDA showed vegetation on aortic and mitral valve.     Per their discharge summary, cultures had cleared at time of discharge on 11/27.  1/25/16 TTE shows mobile mass on mitral valve and resolved vegetation on aortic valve. Patient was treated with IV rocephin x 6 weeks then placed on suppressive antibiotic Keflex and plan was for repeat 2 D echo in Jan 2017 however patient have missed appointment with ID.he was also non compliant with Keflex.CT   had no plan for intervention, Obtained 2 D echo,show same seize MV regurgitation, inflammatory markers elevated, Consulted ID,on vanc.  cardiology planing for KHALIDA today,but patient refusing at this time..patient has been informed no guarranty for resolution of infection.he is non compliant with medical treatment, picc line placed.need 6 weeks IV Abx  -awaiting for son to respond with plan, hire help at home     Dc held due to hyponatremia - work up in progress  Pt agreed to KHALIDA and plan next week

## 2017-07-24 NOTE — ANESTHESIA PREPROCEDURE EVALUATION
07/24/2017  Rolo Blanca is a 77 y.o., male   Pre-operative evaluation for Procedure(s) (LRB):  TRANSESOPHAGEAL ECHOCARDIOGRAM (KHALIDA) (N/A)    Rolo Blanca is a 77 y.o. male     Patient Active Problem List   Diagnosis    Endocarditis of mitral valve    S/P CABG (coronary artery bypass graft)    S/P AVR    Essential hypertension    Hyperlipidemia    Chronic kidney disease, stage III (moderate)    Systolic heart failure due to valvular disease    Hypothyroidism    Mitral regurgitation    History of colon cancer    Carotid disease, bilateral    Long term (current) use of antibiotics    Episodic weakness    Depression    Mood disorder    Anemia    Diverticulitis of large intestine without bleeding    Bacteremia due to Staphylococcus aureus    Urinary retention    Hyponatremia    Hypotension due to drugs       Review of patient's allergies indicates:  No Known Allergies    No current facility-administered medications on file prior to encounter.      Current Outpatient Prescriptions on File Prior to Encounter   Medication Sig Dispense Refill    aspirin (ECOTRIN) 81 MG EC tablet Take 1 tablet (81 mg total) by mouth once daily.  0    atorvastatin (LIPITOR) 10 MG tablet TAKE ONE TABLET BY MOUTH EVERY DAY 90 tablet 1    buPROPion (WELLBUTRIN XL) 300 MG 24 hr tablet Take 1 tablet (300 mg total) by mouth once daily. 30 tablet 11    cephALEXin (KEFLEX) 500 MG capsule TAKE ONE CAPSULE BY MOUTH EVERY TWELVE HOURS 60 capsule 0    diazepam (VALIUM) 2 MG tablet       levothyroxine (SYNTHROID) 75 MCG tablet TAKE ONE TABLET BY MOUTH EVERY DAY BEFORE BREAKFAST 90 tablet 1    lisinopril (PRINIVIL,ZESTRIL) 20 MG tablet Take 2 tablets by mouth daily (Patient taking differently: 20 mg once daily. Take 2 tablets by mouth daily) 180 tablet 12    metoprolol succinate (TOPROL-XL) 25 MG 24 hr tablet  Take 1 tablet (25 mg total) by mouth once daily. 90 tablet 12    nystatin-triamcinolone (MYCOLOG II) cream       promethazine (PHENERGAN) 25 MG suppository Place 1 suppository (25 mg total) rectally every 6 (six) hours as needed (Use if vomiting is not controlled with oral medication). 12 suppository 0    sertraline (ZOLOFT) 50 MG tablet Take 1 tablet (50 mg total) by mouth once daily. 30 tablet 11    triamcinolone acetonide 0.1% (KENALOG) 0.1 % cream          Past Surgical History:   Procedure Laterality Date    AORTIC VALVE REPLACEMENT  2014    CARDIAC SURGERY      CARDIAC VALVE SURGERY      Bio AVR 2014    COLON SURGERY  2000    CORONARY ARTERY BYPASS GRAFT  2014    x2    TONSILLECTOMY         Social History     Social History    Marital status:      Spouse name: N/A    Number of children: N/A    Years of education: N/A     Occupational History    Not on file.     Social History Main Topics    Smoking status: Never Smoker    Smokeless tobacco: Never Used    Alcohol use Yes      Comment: 2-3 X'S A WEEK    Drug use: No    Sexual activity: Not on file     Other Topics Concern    Not on file     Social History Narrative    No narrative on file         Vital Signs Range (Last 24H):  Temp:  [36.6 °C (97.8 °F)-37.3 °C (99.1 °F)]   Pulse:  [76-91]   Resp:  [18-19]   BP: (115-159)/(58-76)   SpO2:  [96 %-99 %]       CBC:   Recent Labs      07/23/17   0519  07/24/17   0340   WBC  18.13*  19.18*   RBC  3.49*  3.41*   HGB  9.3*  9.1*   HCT  28.0*  27.6*   PLT  310  306   MCV  80*  81*   MCH  26.6*  26.7*   MCHC  33.2  33.0       CMP:   Recent Labs      07/23/17   2102  07/24/17   0340   NA  120*  124*   K  4.7  4.5   CL  91*  93*   CO2  21*  23   BUN  25*  24*   CREATININE  1.2  1.1   GLU  173*  120*   CALCIUM  8.8  8.8         Anesthesia Evaluation    I have reviewed the Patient Summary Reports.     I have reviewed the Medications.     Review of Systems  Anesthesia Hx:  No problems with previous  Anesthesia  History of prior surgery of interest to airway management or planning: heart surgery.  Denies Personal Hx of Anesthesia complications.   Cardiovascular:   Hypertension Valvular problems/Murmurs Past MI     Pulmonary:   Shortness of breath    Renal/:   Chronic Renal Disease, CRI    Endocrine:   Hypothyroidism    Psych:   depression          Physical Exam  General:  Well nourished    Airway/Jaw/Neck:  Airway Findings: Mouth Opening: < 3 cm Tongue: Normal  General Airway Assessment: Adult, Possible difficult intubation  Mallampati: IV  Improves to III with phonation.  TM Distance: Normal, at least 6 cm  Jaw/Neck Findings:  Neck ROM: Normal ROM      Dental:  Dental Findings: In tact   Chest/Lungs:  Chest/Lungs Findings: Clear to auscultation, Tachypnea     Heart/Vascular:  Heart Findings: Rate: Normal  Rhythm: Regular Rhythm        Mental Status:  Mental Status Findings:  Alert and Oriented, Cooperative         Anesthesia Plan  Type of Anesthesia, risks & benefits discussed:  Anesthesia Type:  general  Patient's Preference:   Intra-op Monitoring Plan: standard ASA monitors  Intra-op Monitoring Plan Comments:   Post Op Pain Control Plan:   Post Op Pain Control Plan Comments:   Induction:   IV  Beta Blocker:  Patient is on a Beta-Blocker and has received one dose within the past 24 hours (No further documentation required).       Informed Consent: Patient understands risks and agrees with Anesthesia plan.  Questions answered. Anesthesia consent signed with patient.  ASA Score: 3     Day of Surgery Review of History & Physical:    H&P update referred to the surgeon.         Ready For Surgery From Anesthesia Perspective.

## 2017-07-24 NOTE — PLAN OF CARE
Recommendations     Recommendation/Intervention:   1.  When po intake consistently >50% with meals- can d/c boost plus   2. RD to monitor     Goals: Meet >85% EEN  Nutrition Goal Status: new  Communication of RD Recs: reviewed with RN     Continuum of Care Plan     Referral to Outpatient Services:  (D/C planning: Cardiac diet)

## 2017-07-24 NOTE — PLAN OF CARE
Problem: Fall Risk (Adult)  Goal: Identify Related Risk Factors and Signs and Symptoms  Related risk factors and signs and symptoms are identified upon initiation of Human Response Clinical Practice Guideline (CPG)   Outcome: Ongoing (interventions implemented as appropriate)   07/23/17 0445   Fall Risk   Related Risk Factors (Fall Risk) environment unfamiliar;gait/mobility problems;confusion/agitation   Signs and Symptoms (Fall Risk) presence of risk factors     Goal: Absence of Falls  Patient will demonstrate the desired outcomes by discharge/transition of care.   Outcome: Ongoing (interventions implemented as appropriate)   07/24/17 0439   Fall Risk (Adult)   Absence of Falls making progress toward outcome       Problem: Infection, Risk/Actual (Adult)  Goal: Identify Related Risk Factors and Signs and Symptoms  Related risk factors and signs and symptoms are identified upon initiation of Human Response Clinical Practice Guideline (CPG)   Outcome: Ongoing (interventions implemented as appropriate)   07/23/17 0445   Infection, Risk/Actual   Related Risk Factors (Infection, Risk/Actual) chronic illness/condition   Signs and Symptoms (Infection, Risk/Actual) pain;lab value changes;weakness     Goal: Infection Prevention/Resolution  Patient will demonstrate the desired outcomes by discharge/transition of care.   Outcome: Ongoing (interventions implemented as appropriate)   07/24/17 0439   Infection, Risk/Actual (Adult)   Infection Prevention/Resolution making progress toward outcome       Problem: Sepsis/Septic Shock (Adult)  Goal: Signs and Symptoms of Listed Potential Problems Will be Absent, Minimized or Managed (Sepsis/Septic Shock)  Signs and symptoms of listed potential problems will be absent, minimized or managed by discharge/transition of care (reference Sepsis/Septic Shock (Adult) CPG).   Outcome: Ongoing (interventions implemented as appropriate)   07/23/17 0445   Sepsis/Septic Shock   Problems Assessed  (Sepsis) all   Problems Present (Sepsis) none

## 2017-07-24 NOTE — PROGRESS NOTES
"    [facility]  Adult Nutrition  Consult Note    SUMMARY     Recommendations    Recommendation/Intervention:   1.  When po intake consistently >50% with meals- can d/c boost plus   2. RD to monitor    Goals: Meet >85% EEN  Nutrition Goal Status: new  Communication of RD Recs: reviewed with RN    Continuum of Care Plan    Referral to Outpatient Services:  (D/C planning: Cardiac diet)    Reason for Assessment    Reason for Assessment: length of stay  Diagnosis:  (Endocarditis of mitral valve)  Relevent Medical History: CHF; Diverticulitis; MI      General Information Comments: Patient reports appetite improving. Denies issues with n/v/chewing/swallowing. LBM: 7/22. Patient drinking boost plus tid. PO intake improved from 25% to 100%. Reports to follow low sodium diet at home. Reports weight stable PTA.     Nutrition Prescription Ordered    Current Diet Order: Cardiac  Nutrition Order Comments: boost plust tid      Evaluation of Received Nutrients/Fluid Intake    I/O: 360/775  % Intake of Estimated Energy Needs: 50-75%  % Meal Intake: 25% on average; intake improved x 1 meal to 100% + supplements tid  LBM: 7/22    Nutrition Risk Screen     Nutrition Risk Screen: no indicators present    Nutrition/Diet History     Food Preferences: Denies cultural, [place]ian, ethnic food preferences.    Labs/Tests/Procedures/Meds     Pertinent Labs Reviewed: reviewed  Pertinent Labs Comments: K 5.4; Na 125  Pertinent Medications Reviewed: reviewed       Physical Findings    Overall Physical Appearance: obese   Oral/Mouth Cavity: WDL  Skin: intact (Hari 18)    Anthropometrics    Temp: 96.3 °F (35.7 °C)     Height: 5' 8.5" (174 cm)  Weight Method: Bed Scale  Weight: 102.3 kg (225 lb 8.5 oz)  Ideal Body Weight (IBW), Male: 157 lb     % Ideal Body Weight, Male (lb): 143.65 lb     BMI (Calculated): 33.9  BMI Grade: 30 - 34.9- obesity - grade I     Estimated/Assessed Needs    Weight Used For Calorie Calculations: 102.3 kg " (225 lb 8.5 oz)       Energy Need Method: Kcal/kg (2050 kcal (20 kcal/kg))     RMR ([place]-St. Jeor Equation): 1725.44      Weight Used For Protein Calculations: 102.3 kg (225 lb 8.5 oz)  Protein Requirements: 80 g     Fluid Need Method: RDA Method      Assessment and Plan    No nutrition related issues at this time.      Monitor and Evaluation    Food and Nutrient Intake: energy intake, food and beverage intake  Food and Nutrient Adminstration: diet order   Anthropometric Measurements: weight, weight change  Biochemical Data, Medical Tests and Procedures: electrolyte and renal panel  Nutrition-Focused Physical Findings: overall appearance    Nutrition Risk    Level of Risk:  (1 x week)    Nutrition Follow-Up    RD Follow-up?: Yes

## 2017-07-25 VITALS
SYSTOLIC BLOOD PRESSURE: 138 MMHG | HEIGHT: 69 IN | OXYGEN SATURATION: 98 % | DIASTOLIC BLOOD PRESSURE: 66 MMHG | BODY MASS INDEX: 32.83 KG/M2 | TEMPERATURE: 98 F | WEIGHT: 221.63 LBS | RESPIRATION RATE: 20 BRPM | HEART RATE: 84 BPM

## 2017-07-25 PROBLEM — E87.1 HYPONATREMIA: Status: RESOLVED | Noted: 2017-07-19 | Resolved: 2017-07-25

## 2017-07-25 PROBLEM — I95.2 HYPOTENSION DUE TO DRUGS: Status: RESOLVED | Noted: 2017-07-19 | Resolved: 2017-07-25

## 2017-07-25 PROBLEM — K57.32 DIVERTICULITIS OF LARGE INTESTINE WITHOUT BLEEDING: Status: RESOLVED | Noted: 2017-07-13 | Resolved: 2017-07-25

## 2017-07-25 PROBLEM — R33.9 URINARY RETENTION: Status: RESOLVED | Noted: 2017-07-18 | Resolved: 2017-07-25

## 2017-07-25 LAB
BASOPHILS # BLD AUTO: 0.06 K/UL
BASOPHILS NFR BLD: 0.4 %
DIFFERENTIAL METHOD: ABNORMAL
EOSINOPHIL # BLD AUTO: 0.7 K/UL
EOSINOPHIL NFR BLD: 4.5 %
ERYTHROCYTE [DISTWIDTH] IN BLOOD BY AUTOMATED COUNT: 15.4 %
HCT VFR BLD AUTO: 28.1 %
HGB BLD-MCNC: 9.3 G/DL
LYMPHOCYTES # BLD AUTO: 2.1 K/UL
LYMPHOCYTES NFR BLD: 13 %
MCH RBC QN AUTO: 27 PG
MCHC RBC AUTO-ENTMCNC: 33.1 G/DL
MCV RBC AUTO: 82 FL
MONOCYTES # BLD AUTO: 1.7 K/UL
MONOCYTES NFR BLD: 10.9 %
NEUTROPHILS # BLD AUTO: 11.2 K/UL
NEUTROPHILS NFR BLD: 70.5 %
PLATELET # BLD AUTO: 325 K/UL
PMV BLD AUTO: 10.5 FL
RBC # BLD AUTO: 3.44 M/UL
WBC # BLD AUTO: 15.88 K/UL

## 2017-07-25 PROCEDURE — A4216 STERILE WATER/SALINE, 10 ML: HCPCS | Performed by: HOSPITALIST

## 2017-07-25 PROCEDURE — 63600175 PHARM REV CODE 636 W HCPCS: Performed by: HOSPITALIST

## 2017-07-25 PROCEDURE — 25000003 PHARM REV CODE 250: Performed by: NURSE PRACTITIONER

## 2017-07-25 PROCEDURE — 85025 COMPLETE CBC W/AUTO DIFF WBC: CPT

## 2017-07-25 PROCEDURE — 63600175 PHARM REV CODE 636 W HCPCS: Performed by: INTERNAL MEDICINE

## 2017-07-25 PROCEDURE — 94761 N-INVAS EAR/PLS OXIMETRY MLT: CPT

## 2017-07-25 PROCEDURE — 25000003 PHARM REV CODE 250: Performed by: HOSPITALIST

## 2017-07-25 PROCEDURE — 25000003 PHARM REV CODE 250: Performed by: INTERNAL MEDICINE

## 2017-07-25 PROCEDURE — 36415 COLL VENOUS BLD VENIPUNCTURE: CPT

## 2017-07-25 RX ORDER — TAMSULOSIN HYDROCHLORIDE 0.4 MG/1
0.4 CAPSULE ORAL DAILY
Start: 2017-07-25 | End: 2018-07-25

## 2017-07-25 RX ORDER — DOCUSATE SODIUM 100 MG/1
100 CAPSULE, LIQUID FILLED ORAL 2 TIMES DAILY PRN
Refills: 0
Start: 2017-07-25

## 2017-07-25 RX ORDER — ONDANSETRON 8 MG/1
8 TABLET, ORALLY DISINTEGRATING ORAL EVERY 6 HOURS PRN
Qty: 30 TABLET | Refills: 0 | Status: SHIPPED | OUTPATIENT
Start: 2017-07-25 | End: 2017-07-27

## 2017-07-25 RX ORDER — HYDROCODONE BITARTRATE AND ACETAMINOPHEN 5; 325 MG/1; MG/1
1 TABLET ORAL EVERY 8 HOURS PRN
Qty: 30 TABLET | Refills: 0 | Status: SHIPPED | OUTPATIENT
Start: 2017-07-25 | End: 2017-08-04

## 2017-07-25 RX ORDER — AMLODIPINE BESYLATE 2.5 MG/1
2.5 TABLET ORAL DAILY
Start: 2017-07-25 | End: 2018-07-25

## 2017-07-25 RX ORDER — PANTOPRAZOLE SODIUM 40 MG/1
40 TABLET, DELAYED RELEASE ORAL DAILY
Start: 2017-07-25 | End: 2018-07-25

## 2017-07-25 RX ADMIN — VANCOMYCIN HYDROCHLORIDE 1250 MG: 1 INJECTION, POWDER, LYOPHILIZED, FOR SOLUTION INTRAVENOUS at 03:07

## 2017-07-25 RX ADMIN — Medication 10 ML: at 06:07

## 2017-07-25 RX ADMIN — HEPARIN SODIUM 5000 UNITS: 5000 INJECTION, SOLUTION INTRAVENOUS; SUBCUTANEOUS at 05:07

## 2017-07-25 RX ADMIN — ALTEPLASE 2 MG: 2.2 INJECTION, POWDER, LYOPHILIZED, FOR SOLUTION INTRAVENOUS at 05:07

## 2017-07-25 RX ADMIN — LEVOTHYROXINE SODIUM 75 MCG: 75 TABLET ORAL at 05:07

## 2017-07-25 RX ADMIN — TAMSULOSIN HYDROCHLORIDE 0.4 MG: 0.4 CAPSULE ORAL at 08:07

## 2017-07-25 RX ADMIN — ASPIRIN 81 MG: 81 TABLET, COATED ORAL at 08:07

## 2017-07-25 RX ADMIN — HEPARIN SODIUM 5000 UNITS: 5000 INJECTION, SOLUTION INTRAVENOUS; SUBCUTANEOUS at 06:07

## 2017-07-25 RX ADMIN — PANTOPRAZOLE SODIUM 40 MG: 40 TABLET, DELAYED RELEASE ORAL at 08:07

## 2017-07-25 RX ADMIN — ATORVASTATIN CALCIUM 10 MG: 10 TABLET, FILM COATED ORAL at 08:07

## 2017-07-25 RX ADMIN — METOPROLOL SUCCINATE 25 MG: 25 TABLET, EXTENDED RELEASE ORAL at 08:07

## 2017-07-25 RX ADMIN — Medication 10 ML: at 07:07

## 2017-07-25 RX ADMIN — AMLODIPINE BESYLATE 2.5 MG: 2.5 TABLET ORAL at 08:07

## 2017-07-25 RX ADMIN — Medication 10 ML: at 12:07

## 2017-07-25 RX ADMIN — BUPROPION HYDROCHLORIDE 300 MG: 150 TABLET, FILM COATED, EXTENDED RELEASE ORAL at 08:07

## 2017-07-25 NOTE — PROGRESS NOTES
[facility] Hospital Medicine  Progress Note    Patient Name: Rolo Blanca  MRN: [id number]  Patient Class: IP- Inpatient   Admission Date: 7/13/2017  Length of Stay: 11 days  Attending Physician: Meeta Swan MD  Primary Care Provider: Scott Valadez MD        Subjective:     Principal Problem:Endocarditis of mitral valve    HPI:  The patient is a 76 y/o male with a significant history of colon cancer 2000 sp partial colon resection, hypertension, hyperlipidemia, TIAs, CAD s/p MI/CABG 2014, AVR- bioprosthetic valve and AVR and MV endocarditis 2015 now on suppressive antibiotics (currently followed by ID -[facility] Adarsh Howell) who was told by [facility] to return to ED for 7/11positive blood cultures 2/4 (one bottle GPC chain strep, one bottle GPC cluster staph). Patient then transferred to [facility] for further evaluation. Patient initially went to [place] ED on 7/11 with complaints of abdominal pain, nausea, and vomiting which started 3 days ago then found to have on CT abdomen  sigmoid diverticulitis then treated with cipro and metronidazole. Patient reported today abdominal pain and vomiting resolved and had a normal formed bowel movement. Last episode of nausea and vomiting was last night. Also c/o intermittent dizziness upon position changes but denies chest pain or  shortness of breath. He denies fever, chills, URI symptoms and headaches.     Family concern about suppressive antibiotic Keflex for two years after endocarditis episode. Patient missed ID appointment as plan was for repeat 2 D echo in Jan 2017 to evaluate for resolved vegetation on MV).       Hospital Course:  The patient is a 76 y/o male with a significant history of colon cancer 2000 sp partial colon resection, hypertension, hyperlipidemia, TIAs, CAD s/p MI/CABG 2014, AVR- bioprosthetic valve and AVR and MV endocarditis 2015 now on suppressive antibiotics (currently followed by ID -[facility] Adarsh Howell) who was told by [facility] to  return to ED for 7/11positive blood cultures 2/4 (one bottle GPC chain strep, one bottle GPC cluster staph). Patient then transferred to [facility] for further evaluation. Patient initially went to [place] ED on 7/11 with complaints of abdominal pain, nausea, and vomiting which started 3 days ago then found to have on CT abdomen  sigmoid diverticulitis then treated with cipro and metronidazole. Patient had some  abdominal pain and vomiting resolved and had a normal formed bowel movement. On cipro and flagyl.    - MRSA and strep viridance on blood culture on 7.11.17.   - repeat blood culture on 7.13.17 show no growth   - echo 7/14/2017 show persistent mitral vegetation,same size as before, AV is stable with bioprosthesis     -Family concern about suppressive antibiotic Keflex for two years after endocarditis episode.he was non compliant with Keflex. Patient missed ID appointment as plan was for repeat 2 D echo in Jan 2017 to evaluate for resolved vegetation on MV.  -picc line placed 7/17/2017  - plan for 6 weeks IV vancomycin  - ID to follow. Son concerned about patient not able to care for himself and do antibiotics. Currently looking for someone to hire and assist in the home vs possible SNF.    -Urinary retention and h/o increased frequency, started flomax and retention resolved  -dc held due to drop in sodium and hypotension SBP 80s - BP improved ad sodium dropped.    Fluid restriction dc'd and NS IVF given, pt reports not taking on much of diet. Sodium remains low. Cosyntropin test in am. MRI in am. Pt agreed to KHALIDA and spoke with cardiology to do next week.     MRI pending verification of valve with radiology. Eval for emboli and pituitary secondary to hyponatremia  KHALIDA showed known mitral valve vegetations, prosthetic valve without significant findings     Pt now wants placement to get antibiotics and be monitored, he's extremely anxious about going home, CM working on LTAC      Interval History: pt given  mirtazapine last night with better sleep and feels better today, KHALIDA completed and working on LTAC     Review of Systems   Constitutional: Positive for fatigue. Negative for appetite change and chills.   HENT: Negative for congestion, ear discharge and rhinorrhea.    Eyes: Negative for pain, redness and itching.   Respiratory: Negative for cough, chest tightness, shortness of breath, wheezing and stridor.    Cardiovascular: Negative for chest pain and leg swelling.   Gastrointestinal: Negative for abdominal pain, constipation, diarrhea, nausea and vomiting.   Endocrine: Negative for cold intolerance and polydipsia.   Genitourinary: Negative for dysuria and hematuria.   Musculoskeletal: Negative for arthralgias, joint swelling and myalgias.   Skin: Negative for color change and pallor.   Neurological: Negative for syncope, light-headedness and headaches.   Psychiatric/Behavioral: Positive for sleep disturbance. Negative for confusion and hallucinations. The patient is not nervous/anxious.      Objective:     Vital Signs (Most Recent):  Temp: 97.8 °F (36.6 °C) (07/24/17 2035)  Pulse: 82 (07/24/17 2035)  Resp: 20 (07/24/17 2035)  BP: (!) 149/67 (07/24/17 2035)  SpO2: 97 % (07/24/17 2035) Vital Signs (24h Range):  Temp:  [96.3 °F (35.7 °C)-98.2 °F (36.8 °C)] 97.8 °F (36.6 °C)  Pulse:  [82-95] 82  Resp:  [18-20] 20  SpO2:  [94 %-99 %] 97 %  BP: (115-159)/(59-76) 149/67     Weight: 100.5 kg (221 lb 9.6 oz)  Body mass index is 33.2 kg/m².    Intake/Output Summary (Last 24 hours) at 07/24/17 2233  Last data filed at 07/24/17 2000   Gross per 24 hour   Intake              250 ml   Output             2800 ml   Net            -2550 ml      Physical Exam   Constitutional: He is oriented to person, place, and time. He appears well-developed and well-nourished. No distress.   HENT:   Head: Normocephalic and atraumatic.   Right Ear: External ear normal.   Left Ear: External ear normal.   Eyes: Conjunctivae and EOM are normal.  Pupils are equal, round, and reactive to light.   Neck: Normal range of motion. Neck supple.   Cardiovascular: Normal rate and regular rhythm.    Murmur heard.   Systolic murmur is present   Pulmonary/Chest: Effort normal and breath sounds normal. He has no wheezes. He has no rales.   Abdominal: Soft. Bowel sounds are normal. There is no tenderness. There is no guarding.   Musculoskeletal: Normal range of motion.   Neurological: He is alert and oriented to person, place, and time. No cranial nerve deficit.   Skin: Skin is warm. He is not diaphoretic.   Psychiatric: He has a normal mood and affect. His behavior is normal. Judgment and thought content normal.   Vitals reviewed.      Significant Labs:   Blood Culture: No results for input(s): LABBLOO in the last 48 hours.  BMP:   Recent Labs  Lab 07/24/17  0903   *   *   K 5.4*   CL 92*   CO2 28   BUN 21   CREATININE 1.2   CALCIUM 9.4     CBC:   Recent Labs  Lab 07/23/17  0519 07/24/17  0340   WBC 18.13* 19.18*   HGB 9.3* 9.1*   HCT 28.0* 27.6*    306     POCT Glucose: No results for input(s): POCTGLUCOSE in the last 48 hours.    Significant Imaging: I have reviewed and interpreted all pertinent imaging results/findings within the past 24 hours.    Assessment/Plan:      Positive blood cultures    Repeat blood cultures negative  See above          Hypotension due to drugs    DC lisinopril  Lower norvasc 2.5 mg with holding parameters  Continue metoprolol           Hyponatremia    On admit sodium 134, now down to 121 and now climbing up 125  Improved with lasix  Placed on fluid restriction - now dc'd  Urine sodium and osmolarity   NS IVF -dc'd with lowering NA    Thyroid labs in normal range  Spoke with neuro as he has some wax and wane neuro changes and unsteady gait, recommend MRI to eval pituitary/embolic infarcts  Cortisol and cosyntropin test incorrectly done.   MRI pending               Urinary retention    Start flomax and monitor  Likely  undiagnosed BPH           Bacteremia due to Staphylococcus aureus    On 7.11.17 ,Source infected mitral valves,endocarditis,,on Vanc.repeat blood culture on 7.13.17 no growth sofar.placed picc line 7/17/2017.follow vanc. Through level.    IV abx per ID           Diverticulitis of large intestine without bleeding    Appears to be resolving as no further abdominal pain or NVD. Had formed BM. Need repeat colonoscopy as out patient after infectious process resolve.  Off cipro and flagyl         Depression    zoloft dc'd due to SSRi and hyponatremia  Continue wellbutrin   Mirtazapine Qhs           History of colon cancer    Pt had colonoscopy 2 years ago and reported unremarkable. Encourage follow up with GI.          Hypothyroidism    Continue Synthroid.  Lab Results   Component Value Date    TSH 3.269 07/19/2017             Chronic kidney disease, stage III (moderate)    Cr stable  Lasix IV x two doses and improved SOB and edema  Sodium correcting   IVF dc'd   Free water restriction dc'd         Hyperlipidemia    Pt not compliant with current statin regiment. Continue and encourage daily statin intake.   Lab Results   Component Value Date    LDLCALC 65.2 06/17/2017                           S/P AVR    See above, endocarditis.        S/P CABG (coronary artery bypass graft)    No acute issues. Continue ASA and statin.          * Endocarditis of mitral valve      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 Per ID documentation 12/2015 [facility] KHALIDA showed vegetation on aortic and mitral valve.     Per their discharge summary, cultures had cleared at time of discharge on 11/27.  1/25/16 TTE shows mobile mass on mitral valve and resolved vegetation on aortic valve. Patient was treated with IV rocephin x 6 weeks then placed on suppressive antibiotic Keflex and plan was for repeat 2 D echo in Jan 2017 however patient have missed appointment with ID.he was also non  compliant with Keflex.CT   had no plan for intervention, Obtained 2 D echo,show same seize MV regurgitation, inflammatory markers elevated, Consulted ID,on vanc.  cardiology planing for KHALIDA today,but patient refusing at this time..patient has been informed no guarranty for resolution of infection.he is non compliant with medical treatment, picc line placed.need 6 weeks IV Abx  -awaiting for son to respond with plan, hire help at home       KHALIDA completed - prosthetic valve free of infection  IV abx per ID   CM working on LTAc placement           VTE Risk Mitigation         Ordered     heparin (porcine) injection 5,000 Units  Every 8 hours     Route:  Subcutaneous        07/14/17 0640     Place WALDO hose  Until discontinued      07/13/17 1843     Place sequential compression device  Until discontinued      07/13/17 1843          Meeta Swan MD  Department of Hospital Medicine   [facility]

## 2017-07-25 NOTE — PLAN OF CARE
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]  Yes     Priority: 1 - High    Positive blood cultures [R78.81]  Yes    Bacteremia due to Staphylococcus aureus [R78.81]  Yes    Depression [F32.9]  Yes    Chronic kidney disease, stage III (moderate) [N18.3]  Yes    Hypothyroidism [E03.9]  Yes    History of colon cancer [Z85.038]  Yes    Hyperlipidemia [E78.5]  Yes    S/P CABG (coronary artery bypass graft) [Z95.1]  Not Applicable    S/P AVR [Z95.2]  Not Applicable      Resolved Hospital Problems    Diagnosis Date Resolved POA    Hyponatremia [E87.1] 07/25/2017 Yes     Priority: 2     Hypotension due to drugs [I95.2] 07/25/2017 No    Urinary retention [R33.9] 07/25/2017 Yes    Positive blood cultures [R78.81] 07/18/2017 Yes    Diverticulitis of large intestine without bleeding [K57.32] 07/25/2017 Yes       Patient is homebound due to:  Endocarditis of mitral valve    Allergies:Review of patient's allergies indicates:  No Known Allergies    Vitals:       Routine         Diet:   Low NA diet.  1000cc fluid restriction until NA is in normal range.     Acitivities:       - Up in a chair each morning as tolerated  The rest to be determined by PT/OT.     LABS:  Per facility protocol  BMP daily to monitor renal function while on Vancomycin.     Nursing Precautions:      - Aspiration precautions:    - Fall precautions per nursing home protocol   - Decubitus precautions              CONSULTS:     Physical Therapy to evaluate and treat     Occupational Therapy to evaluate and treat           Rolo Blanca   Home Medication Instructions PURNIMA:[id number]    Printed on:07/25/17 1300   Medication  Information                      amlodipine (NORVASC) 2.5 MG tablet  Take 1 tablet (2.5 mg total) by mouth once daily.             aspirin (ECOTRIN) 81 MG EC tablet  Take 1 tablet (81 mg total) by mouth once daily.             atorvastatin (LIPITOR) 10 MG tablet  TAKE ONE TABLET BY MOUTH EVERY DAY             buPROPion (WELLBUTRIN XL) 300 MG 24 hr tablet  Take 1 tablet (300 mg total) by mouth once daily.             dextrose 5 % SolP 250 mL with vancomycin 1250 mg SolR 1,250 mg  Inject 1,250 mg into the vein once daily.  Continue through 8/18 and then stop.           docusate sodium (COLACE) 100 MG capsule  Take 1 capsule (100 mg total) by mouth 2 (two) times daily as needed for Constipation.             hydrocodone-acetaminophen 5-325mg (NORCO) 5-325 mg per tablet  Take 1 tablet by mouth every 8 (eight) hours as needed for Pain (As needed for severe 10 out of 10 pain).             levothyroxine (SYNTHROID) 75 MCG tablet  TAKE ONE TABLET BY MOUTH EVERY DAY BEFORE BREAKFAST             metoprolol succinate (TOPROL-XL) 25 MG 24 hr tablet  Take 1 tablet (25 mg total) by mouth once daily.             ondansetron (ZOFRAN-ODT) 8 MG TbDL  Take 1 tablet (8 mg total) by mouth every 6 (six) hours as needed.             pantoprazole (PROTONIX) 40 MG tablet  Take 1 tablet (40 mg total) by mouth once daily.             sertraline (ZOLOFT) 50 MG tablet  Take 1 tablet (50 mg total) by mouth once daily.             tamsulosin (FLOMAX) 0.4 mg Cp24  Take 1 capsule (0.4 mg total) by mouth once daily.             triamcinolone acetonide 0.1% (KENALOG) 0.1 % cream- apply to affected areas daily.                            _________________________________  Joshua Joy MD  07/25/2017

## 2017-07-25 NOTE — ASSESSMENT & PLAN NOTE
On admit sodium 134, now down to 121 and now climbing up 125  Improved with lasix  Placed on fluid restriction - now dc'd  Urine sodium and osmolarity   NS IVF -dc'd with lowering NA    Thyroid labs in normal range  Spoke with neuro as he has some wax and wane neuro changes and unsteady gait, recommend MRI to eval pituitary/embolic infarcts  Cortisol and cosyntropin test incorrectly done.   MRI pending

## 2017-07-25 NOTE — PLAN OF CARE
Problem: Patient Care Overview  Goal: Plan of Care Review   07/25/17 1824   Coping/Psychosocial   Plan Of Care Reviewed With patient   Awaiting transport to LTAC. Denies pain. Vital signs stable.  No wound or abrasions noted. Assisted to bathroom. Signs and sym;ptoms of infection reviewed    Problem: Fall Risk (Adult)  Goal: Identify Related Risk Factors and Signs and Symptoms  Related risk factors and signs and symptoms are identified upon initiation of Human Response Clinical Practice Guideline (CPG)   Outcome: Ongoing (interventions implemented as appropriate)   07/25/17 1824   Fall Risk   Related Risk Factors (Fall Risk) age-related changes;history of falls;confusion/agitation       Problem: Sepsis/Septic Shock (Adult)  Goal: Signs and Symptoms of Listed Potential Problems Will be Absent, Minimized or Managed (Sepsis/Septic Shock)  Signs and symptoms of listed potential problems will be absent, minimized or managed by discharge/transition of care (reference Sepsis/Septic Shock (Adult) CPG).   Outcome: Ongoing (interventions implemented as appropriate)   07/25/17 1824   Sepsis/Septic Shock   Problems Assessed (Sepsis) all   Problems Present (Sepsis) none

## 2017-07-25 NOTE — PLAN OF CARE
Problem: Patient Care Overview  Goal: Plan of Care Review  Outcome: Ongoing (interventions implemented as appropriate)   07/24/17 1800   Coping/Psychosocial   Plan Of Care Reviewed With patient    Turns self in bed. Forgetful. Easily redirected. No pressure areas noted. Fall precaution in progress. Reviewed fall risk . Contact Isolation in progress . Vital signs stable

## 2017-07-25 NOTE — ASSESSMENT & PLAN NOTE
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 Per ID documentation 12/2015 [facility] KHALIDA showed vegetation on aortic and mitral valve.     Per their discharge summary, cultures had cleared at time of discharge on 11/27.  1/25/16 TTE shows mobile mass on mitral valve and resolved vegetation on aortic valve. Patient was treated with IV rocephin x 6 weeks then placed on suppressive antibiotic Keflex and plan was for repeat 2 D echo in Jan 2017 however patient have missed appointment with ID.he was also non compliant with Keflex.CT   had no plan for intervention, Obtained 2 D echo,show same seize MV regurgitation, inflammatory markers elevated, Consulted ID,on vanc.  cardiology planing for KHALIDA today,but patient refusing at this time..patient has been informed no guarranty for resolution of infection.he is non compliant with medical treatment, picc line placed.need 6 weeks IV Abx  -awaiting for son to respond with plan, hirselina help at home       KHALIDA completed - prosthetic valve free of infection  IV abx per ID   CM working on LTAc placement

## 2017-07-25 NOTE — ASSESSMENT & PLAN NOTE
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 Per ID documentation 12/2015 [facility] KHALIDA showed vegetation on aortic and mitral valve.     Per their discharge summary, cultures had cleared at time of discharge on 11/27.  1/25/16 TTE shows mobile mass on mitral valve and resolved vegetation on aortic valve. Patient was treated with IV rocephin x 6 weeks then placed on suppressive antibiotic Keflex and plan was for repeat 2 D echo in Jan 2017 however patient have missed appointment with ID.he was also non compliant with Keflex.CT   had no plan for intervention, Obtained 2 D echo,show same seize MV regurgitation, inflammatory markers elevated, Consulted ID,on vanc.  cardiology planing for [facility] today,but patient refusing at this time..patient has been informed no guarranty for resolution of infection.he is non compliant with medical treatment, picc line placed.need 6 weeks IV Abx  -awaiting for son to respond with plan, hirselina help at home       KHALIDA completed - prosthetic valve free of infection  IV abx per ID   CM working on LTAc placement

## 2017-07-25 NOTE — PLAN OF CARE
Problem: Fall Risk (Adult)  Goal: Identify Related Risk Factors and Signs and Symptoms  Related risk factors and signs and symptoms are identified upon initiation of Human Response Clinical Practice Guideline (CPG)   Outcome: Ongoing (interventions implemented as appropriate)   07/23/17 0445   Fall Risk   Related Risk Factors (Fall Risk) environment unfamiliar;gait/mobility problems;confusion/agitation   Signs and Symptoms (Fall Risk) presence of risk factors     Goal: Absence of Falls  Patient will demonstrate the desired outcomes by discharge/transition of care.   Outcome: Ongoing (interventions implemented as appropriate)   07/25/17 0317   Fall Risk (Adult)   Absence of Falls making progress toward outcome       Problem: Infection, Risk/Actual (Adult)  Goal: Identify Related Risk Factors and Signs and Symptoms  Related risk factors and signs and symptoms are identified upon initiation of Human Response Clinical Practice Guideline (CPG)   Outcome: Ongoing (interventions implemented as appropriate)   07/23/17 0445   Infection, Risk/Actual   Related Risk Factors (Infection, Risk/Actual) chronic illness/condition   Signs and Symptoms (Infection, Risk/Actual) pain;lab value changes;weakness     Goal: Infection Prevention/Resolution  Patient will demonstrate the desired outcomes by discharge/transition of care.   Outcome: Ongoing (interventions implemented as appropriate)   07/25/17 0317   Infection, Risk/Actual (Adult)   Infection Prevention/Resolution making progress toward outcome

## 2017-07-25 NOTE — PROGRESS NOTES
[facility] Hospital Medicine  Progress Note    Patient Name: Rolo Blanca  MRN: [id number]  Patient Class: IP- Inpatient   Admission Date: 7/13/2017  Length of Stay: 12 days  Attending Physician: Joshua Scott MD  Primary Care Provider: Scott Valadez MD        Subjective:     Principal Problem:Endocarditis of mitral valve    HPI:  The patient is a 76 y/o male with a significant history of colon cancer 2000 sp partial colon resection, hypertension, hyperlipidemia, TIAs, CAD s/p MI/CABG 2014, AVR- bioprosthetic valve and AVR and MV endocarditis 2015 now on suppressive antibiotics (currently followed by ID -[facility] Adarsh Howell) who was told by [facility] to return to ED for 7/11positive blood cultures 2/4 (one bottle GPC chain strep, one bottle GPC cluster staph). Patient then transferred to [facility] for further evaluation. Patient initially went to [place] ED on 7/11 with complaints of abdominal pain, nausea, and vomiting which started 3 days ago then found to have on CT abdomen  sigmoid diverticulitis then treated with cipro and metronidazole. Patient reported today abdominal pain and vomiting resolved and had a normal formed bowel movement. Last episode of nausea and vomiting was last night. Also c/o intermittent dizziness upon position changes but denies chest pain or  shortness of breath. He denies fever, chills, URI symptoms and headaches.     Family concern about suppressive antibiotic Keflex for two years after endocarditis episode. Patient missed ID appointment as plan was for repeat 2 D echo in Jan 2017 to evaluate for resolved vegetation on MV).       Hospital Course:  The patient is a 76 y/o male with a significant history of colon cancer 2000 sp partial colon resection, hypertension, hyperlipidemia, TIAs, CAD s/p MI/CABG 2014, AVR- bioprosthetic valve and AVR and MV endocarditis 2015 now on suppressive antibiotics (currently followed by ID -[facility] Adarsh Howell) who was told by [facility]  to return to ED for 7/11positive blood cultures 2/4 (one bottle GPC chain strep, one bottle GPC cluster staph). Patient then transferred to [facility] for further evaluation. Patient initially went to [place] ED on 7/11 with complaints of abdominal pain, nausea, and vomiting which started 3 days ago then found to have on CT abdomen  sigmoid diverticulitis then treated with cipro and metronidazole. Patient had some  abdominal pain and vomiting resolved and had a normal formed bowel movement. On cipro and flagyl.    - MRSA and strep viridance on blood culture on 7.11.17.   - repeat blood culture on 7.13.17 show no growth   - echo 7/14/2017 show persistent mitral vegetation,same size as before, AV is stable with bioprosthesis     -Family concern about suppressive antibiotic Keflex for two years after endocarditis episode.he was non compliant with Keflex. Patient missed ID appointment as plan was for repeat 2 D echo in Jan 2017 to evaluate for resolved vegetation on MV.  -picc line placed 7/17/2017  - plan for 6 weeks IV vancomycin  - ID to follow. Son concerned about patient not able to care for himself and do antibiotics. Currently looking for someone to hire and assist in the home vs possible SNF.    -Urinary retention and h/o increased frequency, started flomax and retention resolved  -dc held due to drop in sodium and hypotension SBP 80s - BP improved ad sodium dropped.    Fluid restriction dc'd and NS IVF given, pt reports not taking on much of diet. Sodium remains low. Cosyntropin test in am. MRI in am. Pt agreed to KHALIDA and spoke with cardiology to do next week.     MRI pending verification of valve with radiology. Eval for emboli and pituitary secondary to hyponatremia  KHALIDA showed known mitral valve vegetations, prosthetic valve without significant findings     Pt now wants placement to get antibiotics and be monitored, he's extremely anxious about going home, CM working on LTAC      Interval History: No new  complaints.      Review of Systems   Constitutional: Negative for activity change.   HENT: Negative for congestion.    Respiratory: Negative for cough, chest tightness and shortness of breath.    Cardiovascular: Negative for chest pain.   Gastrointestinal: Negative for abdominal pain.     Objective:     Vital Signs (Most Recent):  Temp: 97.8 °F (36.6 °C) (07/25/17 0400)  Pulse: 79 (07/25/17 0400)  Resp: 20 (07/25/17 0400)  BP: (!) 116/56 (07/25/17 0024)  SpO2: 96 % (07/25/17 0400) Vital Signs (24h Range):  Temp:  [96.3 °F (35.7 °C)-98.2 °F (36.8 °C)] 97.8 °F (36.6 °C)  Pulse:  [76-95] 79  Resp:  [18-20] 20  SpO2:  [94 %-98 %] 96 %  BP: (116-149)/(56-75) 116/56     Weight: 100.5 kg (221 lb 9.6 oz)  Body mass index is 33.2 kg/m².    Intake/Output Summary (Last 24 hours) at 07/25/17 0659  Last data filed at 07/25/17 0600   Gross per 24 hour   Intake              445 ml   Output             2100 ml   Net            -1655 ml      Physical Exam   Constitutional: He is oriented to person, place, and time. He appears well-developed and well-nourished.   HENT:   Head: Normocephalic.   Neurological: He is alert and oriented to person, place, and time.   Skin: Skin is warm.   Psychiatric: He has a normal mood and affect. His behavior is normal.       Significant Labs:   BMP:   Recent Labs  Lab 07/24/17  2137   *   *   K 4.3   CL 90*   CO2 25   BUN 21   CREATININE 1.3   CALCIUM 9.0     CBC:   Recent Labs  Lab 07/24/17  0340 07/25/17  0410   WBC 19.18* 15.88*   HGB 9.1* 9.3*   HCT 27.6* 28.1*    325       Significant Imaging:     Assessment/Plan:      * Endocarditis of mitral valve      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 Per ID documentation 12/2015 [facility] KHALIDA showed vegetation on aortic and mitral valve.     Per their discharge summary, cultures had cleared at time of discharge on 11/27.  1/25/16 TTE shows mobile mass on mitral valve and  resolved vegetation on aortic valve. Patient was treated with IV rocephin x 6 weeks then placed on suppressive antibiotic Keflex and plan was for repeat 2 D echo in Jan 2017 however patient have missed appointment with ID.he was also non compliant with Keflex.CT   had no plan for intervention, Obtained 2 D echo,show same seize MV regurgitation, inflammatory markers elevated, Consulted ID,on vanc.  cardiology planing for [facility] today,but patient refusing at this time..patient has been informed no guarranty for resolution of infection.he is non compliant with medical treatment, picc line placed.need 6 weeks IV Abx  -awaiting for son to respond with plan, hire help at home       KHALIDA completed - prosthetic valve free of infection  IV abx per ID   CM working on LTAc placement         Hyponatremia    On admit sodium 134, now down to 121 and now climbing up 125  Improved with lasix  Placed on fluid restriction - now dc'd  Urine sodium and osmolarity   NS IVF -dc'd with lowering NA    Thyroid labs in normal range  Spoke with neuro as he has some wax and wane neuro changes and unsteady gait, recommend MRI to eval pituitary/embolic infarcts  Cortisol and cosyntropin test incorrectly done.   MRI pending               Positive blood cultures    Repeat blood cultures negative  See above          Hypotension due to drugs    DC lisinopril  Lower norvasc 2.5 mg with holding parameters  Continue metoprolol           Urinary retention    Start flomax and monitor  Likely undiagnosed BPH           Bacteremia due to Staphylococcus aureus    On 7.11.17 ,Source infected mitral valves,endocarditis,,on Vanc.repeat blood culture on 7.13.17 no growth sofar.placed picc line 7/17/2017.follow vanc. Through level.    IV abx per ID           Diverticulitis of large intestine without bleeding    Appears to be resolving as no further abdominal pain or NVD. Had formed BM. Need repeat colonoscopy as out patient after infectious process  resolve.  Off cipro and flagyl         Depression    zoloft dc'd due to SSRi and hyponatremia  Continue wellbutrin   Mirtazapine Qhs           History of colon cancer    Pt had colonoscopy 2 years ago and reported unremarkable. Encourage follow up with GI.          Hypothyroidism    Continue Synthroid.  Lab Results   Component Value Date    TSH 3.269 07/19/2017             Chronic kidney disease, stage III (moderate)    Cr stable  Lasix IV x two doses and improved SOB and edema  Sodium correcting   IVF dc'd   Free water restriction dc'd         Hyperlipidemia    Pt not compliant with current statin regiment. Continue and encourage daily statin intake.   Lab Results   Component Value Date    LDLCALC 65.2 06/17/2017             Essential hypertension              S/P AVR    See above, endocarditis.        S/P CABG (coronary artery bypass graft)    No acute issues. Continue ASA and statin.            VTE Risk Mitigation         Ordered     heparin (porcine) injection 5,000 Units  Every 8 hours     Route:  Subcutaneous        07/14/17 0640     Place WALDO hose  Until discontinued      07/13/17 1843     Place sequential compression device  Until discontinued      07/13/17 1843        Continue Abx for now.  Follow BMP for hyponatremia.  Will discuss with case management this am.      Addendum 12:49pm  Discussed with . Patient will be going to [facility] today. Vanc through 8/18.     Joshua Joy MD  Department of Hospital Medicine   [facility]

## 2017-07-25 NOTE — CONSULTS
Consult Note  Infectious Disease    Consult Requested By: Joshua Scott MD    Reason for Consult: staph aureus sepsis and strep viridans sepsis    SUBJECTIVE:     History of Present Illness:  Patient is a 77 y.o. male presents with  Abdominal pain. He was seen in ed on 7/11/17 with bilateral lower quadrant pain and was diagnosed with acute diverticulitis when a ct abdomen showed mild sigmoid stranding. He was rx with cipro and flagyl and did not improve. He now returns with decreasing abdominal pain and fatigue. He denies fever, sobar or chest pains. 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 He developed mitral regurgitation and several episodes of chf.he went home with hospice after he declined operative intervention. He sought a second opinion at [facility] and was declined as a surgical candidate. Repeat blood cultures on 12/8/15,6/28/16,10/3/16 were all negative. He was comitted to 6 weeks of iv rocephin and stopped iv abx 1/22/16. He was then placed on po keflex 500 mg q 12 hours and apparently has continued that modality but has missed the occasional dose. He was last seen by an id np at [facility] in October 2016. His tte on 12/19/15 and 1/25/16 continued to show a small mitral valve mass with mr- this was felt to have been sterilised and no surgery was undertaken.  His blood cultures form 7/11/17 now are positive for a staph aureus and a strep viridans. His repeat blood cultures taken while on antibiotics are negative form 7/13/17.    Past Medical History:   Diagnosis Date    MI, old 2014     Past Surgical History:   Procedure Laterality Date    AORTIC VALVE REPLACEMENT  2014    CARDIAC SURGERY      CARDIAC VALVE SURGERY      Bio AVR 2014    COLON SURGERY  2000    CORONARY ARTERY BYPASS GRAFT  2014    x2    TONSILLECTOMY       History reviewed. No pertinent family  history.  Social History   Substance Use Topics    Smoking status: Never Smoker    Smokeless tobacco: Never Used    Alcohol use Yes      Comment: 2-3 X'S A WEEK       Review of patient's allergies indicates:  No Known Allergies     Antibiotics     Start     Stop Route Frequency Ordered    07/21/17 0300  vancomycin (VANCOCIN) 1,250 mg in dextrose 5 % 250 mL IVPB      -- IV Every 24 hours (non-standard times) 07/20/17 1816          Review of Systems:  Constitutional: no fever or chills  Eyes: no visual changes  ENT: no nasal congestion or sore throat  Respiratory: no cough or shortness of breath  Cardiovascular: no chest pain or palpitations  Gastrointestinal: positive for abdominal pain  Genitourinary: no hematuria or dysuria  Hematologic/Lymphatic: no easy bruising or lymphadenopathy  Musculoskeletal: no arthralgias or myalgias  Neurological: no seizures or tremors    OBJECTIVE:     Vital Signs (Most Recent)  Temp: 97.8 °F (36.6 °C) (07/25/17 0759)  Pulse: 78 (07/25/17 0759)  Resp: 19 (07/25/17 0759)  BP: 136/74 (07/25/17 0759)  SpO2: 96 % (07/25/17 0902)    Temperature Range Min/Max (Last 24H):  Temp:  [96.3 °F (35.7 °C)-97.9 °F (36.6 °C)]     Physical Exam:  General: well developed, well nourished  HENT: Head:normocephalic, atraumatic. Ears:not examined. Nose: Nares normal. Septum midline. Mucosa normal. No drainage or sinus tenderness., no discharge. Throat: lips, mucosa, and tongue normal; teeth and gums normal and no throat erythema.  Eyes: conjunctivae/corneas clear. PERRL.   Neck: supple, symmetrical, trachea midline, no JVD and thyroid not enlarged, symmetric, no tenderness/mass/nodules  Lungs:  clear to auscultation bilaterally and normal respiratory effort  Cardiovascular: Heart: loud psm lse 4/6. Chest Wall: no tenderness. Extremities: no cyanosis or edema, or clubbing. Pulses: 2+ and symmetric.  Abdomen/Rectal: Abdomen: soft, non-tender non-distented; bowel sounds normal; no masses,  no organomegaly.  Rectal: not examined  Skin: Skin color, texture, turgor normal. No rashes or lesions  Musculoskeletal:no clubbing, cyanosis  Lymph Nodes: No cervical or supraclavicular adenopathy    Laboratory:  CBC    Recent Labs  Lab 07/25/17  0410   WBC 15.88*   RBC 3.44*   HGB 9.3*   HCT 28.1*        BMP    Recent Labs  Lab 07/24/17  2137   CO2 25   BUN 21   CREATININE 1.3   CALCIUM 9.0     No results for input(s): COLORU, CLARITYU, SPECGRAV, PHUR, PROTEINUA, GLUCOSEU, BILIRUBINCON, BLOODU, WBCU, RBCU, BACTERIA, MUCUS, NITRITE, LEUKOCYTESUR, UROBILINOGEN, HYALINECASTS in the last 168 hours.  Microbiology Results (last 7 days)     ** No results found for the last 168 hours. **          Diagnostic Results:  Labs: Reviewed  X-Ray: Reviewed  Echo: Reviewed    ASSESSMENT/PLAN:     Active Hospital Problems    Diagnosis  POA    *Endocarditis of mitral valve [I05.8]  Yes    Positive blood cultures [R78.81]  Yes    Hyponatremia [E87.1]  Yes    Hypotension due to drugs [I95.2]  No    Urinary retention [R33.9]  Yes    Bacteremia due to Staphylococcus aureus [R78.81]  Yes    Diverticulitis of large intestine without bleeding [K57.32]  Yes    Depression [F32.9]  Yes    Chronic kidney disease, stage III (moderate) [N18.3]  Yes    Hypothyroidism [E03.9]  Yes    History of colon cancer [Z85.038]  Yes    Hyperlipidemia [E78.5]  Yes    S/P CABG (coronary artery bypass graft) [Z95.1]  Not Applicable    S/P AVR [Z95.2]  Not Applicable      Resolved Hospital Problems    Diagnosis Date Resolved POA    Positive blood cultures [R78.81] 07/18/2017 Yes       1. Staph aureus(mrsa) and strep salivarius in blood. He has a prosthetic aortic valve and has had ? Partially sterilised vegetation on mitral valve since 2015    He is apparently not a surgical candidate  He has not had staph aureus in blood before  Suggest   Sai  Repeat ct abdomen and pelvis to ensure no abscess etc- looks better  Iv vancomycin  Will likely need 6 weeks of iv  abx again, no guarantee of success   I cannot rule out contaminated cultures and have to rx as with prosthetic aortic valve and mitral vegetation  KHALIDA with good function of aortic valve prosthesis and small chronic vegetation on mitral valve  Dc date of vancomycin will be august 18th 2017  Waiting on disposition

## 2017-07-25 NOTE — DISCHARGE SUMMARY
[facility] Hospital Medicine  Discharge Summary      Patient Name: Rolo Blanca  MRN: [id number]  Admission Date: 7/13/2017  Hospital Length of Stay: 12 days  Discharge Date and Time:  07/25/2017 1:00 PM  Attending Physician: Joshua Scott MD   Discharging Provider: Joshua Scott MD  Primary Care Provider: Scott Valadez MD      HPI:   The patient is a 76 y/o male with a significant history of colon cancer 2000 sp partial colon resection, hypertension, hyperlipidemia, TIAs, CAD s/p MI/CABG 2014, AVR- bioprosthetic valve and AVR and MV endocarditis 2015 now on suppressive antibiotics (currently followed by [place]) who was told by [place] [facility] to return to ED for 7/11positive blood cultures 2/4 (one bottle GPC chain strep, one bottle GPC cluster staph). Patient then transferred to [facility] for further evaluation. Patient initially went to [place] ED on 7/11 with complaints of abdominal pain, nausea, and vomiting which started 3 days ago then found to have on CT abdomen  sigmoid diverticulitis then treated with cipro and metronidazole. Patient reported today abdominal pain and vomiting resolved and had a normal formed bowel movement. Last episode of nausea and vomiting was last night. Also c/o intermittent dizziness upon position changes but denies chest pain or  shortness of breath. He denies fever, chills, URI symptoms and headaches.     Family concern about suppressive antibiotic Keflex for two years after endocarditis episode. Patient missed ID appointment as plan was for repeat 2 D echo in Jan 2017 to evaluate for resolved vegetation on MV).       Procedure(s) (LRB):  TRANSESOPHAGEAL ECHOCARDIOGRAM (KHALIDA) (N/A)      Indwelling Lines/Drains at time of discharge:   Lines/Drains/Airways     Peripherally Inserted Central Catheter Line                 PICC Double Lumen 07/17/17 1020 left basilic 8 days              Hospital Course:   The patient is a 76 y/o male with a  significant history of colon cancer 2000 sp partial colon resection, hypertension, hyperlipidemia, TIAs, CAD s/p MI/CABG 2014, AVR- bioprosthetic valve and AVR and MV endocarditis 2015 now on suppressive antibiotics (currently [facility] [place] who was told by [place] [facility] to return to ED for 7/11positive blood cultures 2/4 (one bottle GPC chain strep, one bottle GPC cluster staph). Patient then transferred to [facility] for further evaluation. Patient initially went to [place] ED on 7/11 with complaints of abdominal pain, nausea, and vomiting which started 3 days ago then found to have on CT abdomen  sigmoid diverticulitis then treated with cipro and metronidazole- resolved problem.    - MRSA and strep viridance on blood culture on 7.11.17.   - repeat blood culture on 7.13.17 show no growth   - echo 7/14/2017 show persistent mitral vegetation,same size as before, AV is stable with bioprosthesis    -picc line placed 7/17/2017  - plan for 6 weeks IV vancomycin through 8/18.  ID followed the patient.   KHALIDA showed known mitral valve vegetations, prosthetic valve without significant findings    was consulted and the patient was accepted to [facility] on 7/25.  He will be discharged to LTAC today.  Patient had issues of hyponatremia and will be sent with a fluid restriction until corrected.  Activity as per PT/OT. Patient needs to follow up on discharge with ID as well as GI for repeated out patient C-scope.  Diet- low NA.       Consults:   Consults         Status Ordering Provider     Inpatient consult to Cardiology  Once     Provider:  Jaxon Brower MD    Acknowledged KATELIN DAVENPORT     Inpatient consult to Cardiology  Once     Provider:  Jaxon Brower MD    Acknowledged ABIOLA LI     Inpatient consult to Infectious Diseases  Once     Provider:  Katelin Davenport MD    Completed TIM ARMENDARIZ     [facility]  Once     Provider:  (Not yet  assigned)    Acknowledged CATIA OCHOA     Inpatient consult to PICC team ([facility])  Once     Provider:  (Not yet assigned)    Acknowledged KATELIN COLE     Inpatient consult to Social Work  Once     Provider:  (Not yet assigned)    Completed KATELIN COLE          Significant Diagnostic Studies:     Pending Diagnostic Studies:     Procedure Component Value Units Date/Time    Basic metabolic panel [[id number]]     Order Status:  Sent Lab Status:  No result     Specimen:  Blood from Blood         Final Active Diagnoses:    Diagnosis Date Noted POA    PRINCIPAL PROBLEM:  Endocarditis of mitral valve [I05.8] 12/08/2015 Yes    Positive blood cultures [R78.81] 07/24/2017 Yes    Bacteremia due to Staphylococcus aureus [R78.81] 07/15/2017 Yes    Depression [F32.9] 11/01/2016 Yes    Chronic kidney disease, stage III (moderate) [N18.3] 02/03/2016 Yes    Hypothyroidism [E03.9] 02/03/2016 Yes    History of colon cancer [Z85.038] 02/03/2016 Yes    Hyperlipidemia [E78.5] 01/14/2016 Yes    S/P CABG (coronary artery bypass graft) [Z95.1] 01/06/2016 Not Applicable    S/P AVR [Z95.2] 01/06/2016 Not Applicable      Problems Resolved During this Admission:    Diagnosis Date Noted Date Resolved POA    Hyponatremia [E87.1] 07/19/2017 07/25/2017 Yes    Hypotension due to drugs [I95.2] 07/19/2017 07/25/2017 No    Urinary retention [R33.9] 07/18/2017 07/25/2017 Yes    Positive blood cultures [R78.81] 07/17/2017 07/18/2017 Yes    Diverticulitis of large intestine without bleeding [K57.32] 07/13/2017 07/25/2017 Yes      No new Assessment & Plan notes have been filed under this hospital service since the last note was generated.  Service: Hospital Medicine      Discharged Condition: good    Disposition: Home or Self Care    Follow Up:  Follow-up Information     Scott Valadez MD On 8/9/2017.    Specialty:  Internal Medicine  Why:  @1:20pm for hospital follow up, Outpatient services (Co-pay is $20)  Contact  information:  [address] [id number] [phone]             Scott Valadez MD In 1 week.    Specialty:  Internal Medicine  Contact information:  [address] [place] [id number] [phone]                 Patient Instructions:   No discharge procedures on file.  Medications:  Reconciled Home Medications:   Current Discharge Medication List      START taking these medications    Details   amlodipine (NORVASC) 2.5 MG tablet Take 1 tablet (2.5 mg total) by mouth once daily.      dextrose 5 % SolP 250 mL with vancomycin 1,000 mg SolR 1,250 mg Inject 1,250 mg into the vein once daily.      docusate sodium (COLACE) 100 MG capsule Take 1 capsule (100 mg total) by mouth 2 (two) times daily as needed for Constipation.  Refills: 0      pantoprazole (PROTONIX) 40 MG tablet Take 1 tablet (40 mg total) by mouth once daily.      tamsulosin (FLOMAX) 0.4 mg Cp24 Take 1 capsule (0.4 mg total) by mouth once daily.         CONTINUE these medications which have CHANGED    Details   hydrocodone-acetaminophen 5-325mg (NORCO) 5-325 mg per tablet Take 1 tablet by mouth every 8 (eight) hours as needed for Pain (As needed for severe 10 out of 10 pain).  Qty: 30 tablet, Refills: 0      ondansetron (ZOFRAN-ODT) 8 MG TbDL Take 1 tablet (8 mg total) by mouth every 6 (six) hours as needed.  Qty: 30 tablet, Refills: 0         CONTINUE these medications which have NOT CHANGED    Details   aspirin (ECOTRIN) 81 MG EC tablet Take 1 tablet (81 mg total) by mouth once daily.  Refills: 0      atorvastatin (LIPITOR) 10 MG tablet TAKE ONE TABLET BY MOUTH EVERY DAY  Qty: 90 tablet, Refills: 1      buPROPion (WELLBUTRIN XL) 300 MG 24 hr tablet Take 1 tablet (300 mg total) by mouth once daily.  Qty: 30 tablet, Refills: 11      levothyroxine (SYNTHROID) 75 MCG tablet TAKE ONE TABLET BY MOUTH EVERY DAY BEFORE BREAKFAST  Qty: 90 tablet, Refills: 1      metoprolol succinate (TOPROL-XL) 25 MG 24 hr tablet Take 1 tablet (25 mg total) by  mouth once daily.  Qty: 90 tablet, Refills: 12    Comments: PT LOST MEDS , PLEASE FILL ASAP      nystatin-triamcinolone (MYCOLOG II) cream       sertraline (ZOLOFT) 50 MG tablet Take 1 tablet (50 mg total) by mouth once daily.  Qty: 30 tablet, Refills: 11      triamcinolone acetonide 0.1% (KENALOG) 0.1 % cream          STOP taking these medications       cephALEXin (KEFLEX) 500 MG capsule Comments:   Reason for Stopping:         ciprofloxacin HCl (CIPRO) 500 MG tablet Comments:   Reason for Stopping:         diazepam (VALIUM) 2 MG tablet Comments:   Reason for Stopping:         lisinopril (PRINIVIL,ZESTRIL) 20 MG tablet Comments:   Reason for Stopping:         metronidazole (FLAGYL) 500 MG tablet Comments:   Reason for Stopping:         promethazine (PHENERGAN) 25 MG suppository Comments:   Reason for Stopping:             Time spent on the discharge of patient:  > 30  minutes    Joshua Joy MD  Department of Hospital Medicine  [facility]

## 2017-07-25 NOTE — PLAN OF CARE
07/25/17 1332   Discharge Reassessment   Assessment Type Discharge Planning Reassessment   Call received from Alla at [facility] stating that patient is accepted.  TN notified Dr Scott.  New orders noted for transfer. Orders, DC summary and progress note faxed to Alla at [phone].  Awaiting response.      1500:  Call received from Alla stating that pt will go to room 7105.  Call report info given to nurseRuth.    1520:  Request for Van transportation completed via phone to \[facility]\"" @ [phone].  ETA 1 - 2 hours.

## 2017-07-25 NOTE — PLAN OF CARE
07/25/17 1649   Final Note   Assessment Type Final Discharge Note   Discharge Disposition Home   Discharge planning education complete? No   What phone number can be called within the next 1-3 days to see how you are doing after discharge? ([phone])   Hospital Follow Up  Appt(s) scheduled? (n/a)   Discharge plans and expectations educations in teach back method with documentation complete? Yes   Offered [facility] -- Bedside Delivery? n/a   Discharge/Hospital Encounter Summary to ([facility]) PCP n/a   Referral to Outpatient Case Management complete? n/a   Referral to / orders for Home Health Complete? n/a   30 day supply of medicines given at discharge, if documented non-compliance / non-adherence? n/a   Any social issues identified prior to discharge? n/a   Did you assess the readiness or willingness of the family or caregiver to support self management of care? Yes   Right Care Referral Info   Referral Type LTAC   Facility Name [facility]

## 2017-07-25 NOTE — ASSESSMENT & PLAN NOTE
On 7.11.17 ,Source infected mitral valves,endocarditis,,on Vanc.repeat blood culture on 7.13.17 no growth sofar.placed picc line 7/17/2017.follow vanc. Through level.    IV abx per ID

## 2017-07-25 NOTE — ASSESSMENT & PLAN NOTE
Cr stable  Lasix IV x two doses and improved SOB and edema  Sodium correcting   IVF dc'd   Free water restriction dc'd

## 2017-07-25 NOTE — SUBJECTIVE & OBJECTIVE
Interval History: No new complaints.      Review of Systems   Constitutional: Negative for activity change.   HENT: Negative for congestion.    Respiratory: Negative for cough, chest tightness and shortness of breath.    Cardiovascular: Negative for chest pain.   Gastrointestinal: Negative for abdominal pain.     Objective:     Vital Signs (Most Recent):  Temp: 97.8 °F (36.6 °C) (07/25/17 0400)  Pulse: 79 (07/25/17 0400)  Resp: 20 (07/25/17 0400)  BP: (!) 116/56 (07/25/17 0024)  SpO2: 96 % (07/25/17 0400) Vital Signs (24h Range):  Temp:  [96.3 °F (35.7 °C)-98.2 °F (36.8 °C)] 97.8 °F (36.6 °C)  Pulse:  [76-95] 79  Resp:  [18-20] 20  SpO2:  [94 %-98 %] 96 %  BP: (116-149)/(56-75) 116/56     Weight: 100.5 kg (221 lb 9.6 oz)  Body mass index is 33.2 kg/m².    Intake/Output Summary (Last 24 hours) at 07/25/17 0659  Last data filed at 07/25/17 0600   Gross per 24 hour   Intake              445 ml   Output             2100 ml   Net            -1655 ml      Physical Exam   Constitutional: He is oriented to person, place, and time. He appears well-developed and well-nourished.   HENT:   Head: Normocephalic.   Neurological: He is alert and oriented to person, place, and time.   Skin: Skin is warm.   Psychiatric: He has a normal mood and affect. His behavior is normal.       Significant Labs:   BMP:   Recent Labs  Lab 07/24/17  2137   *   *   K 4.3   CL 90*   CO2 25   BUN 21   CREATININE 1.3   CALCIUM 9.0     CBC:   Recent Labs  Lab 07/24/17  0340 07/25/17  0410   WBC 19.18* 15.88*   HGB 9.1* 9.3*   HCT 27.6* 28.1*    325       Significant Imaging:

## 2017-07-25 NOTE — NURSING
Call placed to Dr. Rdz, Informed him that one of patient's PICC line ports will not flush and the other port is sluggish.  Md gave new order for Cathflow X 1 dose to affected port(S)

## 2017-07-25 NOTE — SUBJECTIVE & OBJECTIVE
Interval History: pt given mirtazapine last night with better sleep and feels better today, KHALIDA completed and working on LTAC     Review of Systems   Constitutional: Positive for fatigue. Negative for appetite change and chills.   HENT: Negative for congestion, ear discharge and rhinorrhea.    Eyes: Negative for pain, redness and itching.   Respiratory: Negative for cough, chest tightness, shortness of breath, wheezing and stridor.    Cardiovascular: Negative for chest pain and leg swelling.   Gastrointestinal: Negative for abdominal pain, constipation, diarrhea, nausea and vomiting.   Endocrine: Negative for cold intolerance and polydipsia.   Genitourinary: Negative for dysuria and hematuria.   Musculoskeletal: Negative for arthralgias, joint swelling and myalgias.   Skin: Negative for color change and pallor.   Neurological: Negative for syncope, light-headedness and headaches.   Psychiatric/Behavioral: Positive for sleep disturbance. Negative for confusion and hallucinations. The patient is not nervous/anxious.      Objective:     Vital Signs (Most Recent):  Temp: 97.8 °F (36.6 °C) (07/24/17 2035)  Pulse: 82 (07/24/17 2035)  Resp: 20 (07/24/17 2035)  BP: (!) 149/67 (07/24/17 2035)  SpO2: 97 % (07/24/17 2035) Vital Signs (24h Range):  Temp:  [96.3 °F (35.7 °C)-98.2 °F (36.8 °C)] 97.8 °F (36.6 °C)  Pulse:  [82-95] 82  Resp:  [18-20] 20  SpO2:  [94 %-99 %] 97 %  BP: (115-159)/(59-76) 149/67     Weight: 100.5 kg (221 lb 9.6 oz)  Body mass index is 33.2 kg/m².    Intake/Output Summary (Last 24 hours) at 07/24/17 2233  Last data filed at 07/24/17 2000   Gross per 24 hour   Intake              250 ml   Output             2800 ml   Net            -2550 ml      Physical Exam   Constitutional: He is oriented to person, place, and time. He appears well-developed and well-nourished. No distress.   HENT:   Head: Normocephalic and atraumatic.   Right Ear: External ear normal.   Left Ear: External ear normal.   Eyes:  Conjunctivae and EOM are normal. Pupils are equal, round, and reactive to light.   Neck: Normal range of motion. Neck supple.   Cardiovascular: Normal rate and regular rhythm.    Murmur heard.   Systolic murmur is present   Pulmonary/Chest: Effort normal and breath sounds normal. He has no wheezes. He has no rales.   Abdominal: Soft. Bowel sounds are normal. There is no tenderness. There is no guarding.   Musculoskeletal: Normal range of motion.   Neurological: He is alert and oriented to person, place, and time. No cranial nerve deficit.   Skin: Skin is warm. He is not diaphoretic.   Psychiatric: He has a normal mood and affect. His behavior is normal. Judgment and thought content normal.   Vitals reviewed.      Significant Labs:   Blood Culture: No results for input(s): LABBLOO in the last 48 hours.  BMP:   Recent Labs  Lab 07/24/17  0903   *   *   K 5.4*   CL 92*   CO2 28   BUN 21   CREATININE 1.2   CALCIUM 9.4     CBC:   Recent Labs  Lab 07/23/17  0519 07/24/17  0340   WBC 18.13* 19.18*   HGB 9.3* 9.1*   HCT 28.0* 27.6*    306     POCT Glucose: No results for input(s): POCTGLUCOSE in the last 48 hours.    Significant Imaging: I have reviewed and interpreted all pertinent imaging results/findings within the past 24 hours.

## 2017-07-26 NOTE — PT/OT/SLP DISCHARGE
Physical Therapy Discharge Summary    Rolo Blanca  MRN: [id number]   Endocarditis of mitral valve   Patient Discharged from acute Physical Therapy on 17.  Please refer to prior PT noted date on 17 for functional status.     Assessment:   Goals partially met. Patient appropriate for care in another setting.  GOALS:    Physical Therapy Goals        Problem: Physical Therapy Goal    Goal Priority Disciplines Outcome Goal Variances Interventions   Physical Therapy Goal     PT/OT, PT Unable to achieve outcome(s) by discharge     Description:  Goals to be met by: 17    Patient will increase functional independence with mobility by performin. Sit to stand transfer with Modified [place]  2. Gait  x 250 feet with Modified [place]   3. Ascend/descend 4 stair with bilateral Handrails Modified [place]   4. Lower extremity exercise program x30 reps per handout, with independence                     Reasons for Discontinuation of Therapy Services  Transfer to alternate level of care.      Plan:  Patient Discharged to: Long Term Acute Care.

## 2017-07-27 ENCOUNTER — PATIENT OUTREACH (OUTPATIENT)
Dept: ADMINISTRATIVE | Facility: CLINIC | Age: 77
End: 2017-07-27

## 2017-07-27 NOTE — PROGRESS NOTES
C3 nurse attempted to contact patient. The following occurred:   C3 nurse attempted to contact Rolo Blanca  for a TCC post hospital discharge follow up call. The patient is unable to conduct the call @ this time. The patient requested a callback.    The patient has a scheduled HOSFU appointment with Scott Valadez MD  on 8/9 @ 1320. Message sent to Physician staff.

## 2017-07-27 NOTE — PATIENT INSTRUCTIONS
Depression  Depression is one of the most common mental health problems today. It is not just a state of unhappiness or sadness. It is a true disease. The cause seems to be related to a decrease in chemicals that transmit signals in the brain. Having a family history of depression, alcoholism or suicide increases the risk. Chronic illness, chronic pain, migraine headaches and high emotional stress also increase the risk.  Depression can cause many different symptoms, such as:  -- Loss of appetite  -- Over-eating  -- Not being able to sleep  -- Sleeping too much  -- Tiredness not related to physical exertion  -- Restlessness or irritability  -- Slowness of movement or speech  -- Feeling depressed or withdrawn  -- Loss of interest in things you once enjoyed  -- Difficulty in concentrating, poor memory, have trouble making decisions  -- Thoughts of harming or killing oneself, or thoughts that life is not worth living  -- Low self-esteem  The best treatment for depression is a combination of medicine and psychotherapy. Antidepressant medicines can reduce suffering and can improve the ability to function during the depressed period. Therapy can offer emotional support and help you understand emotional factors that may be causing the depression.  Home Care:  1) Be kind to yourself. Make it a point to do things that you enjoy (gardening, walking in nature, going to a movie, etc.). Reward yourself for small successes.  2) Take care of your physical body. Eat a balanced diet (low in saturated fat and high in fruits and vegetables). Establish an exercise plan at least 3 times a week for 30 minutes. Even mild-moderate exercise (like brisk walking) can make you feel better.  3) Avoid alcohol, which can make depression worse.  Follow-Up  with your doctor as advised. It is important to keep in contact with a health care provider until your symptoms begin to improve.  Get Prompt Medical Attention  if any of the following  occur:  -- Feeling extreme depression, fear, anxiety, or anger toward yourself or others  -- Feeling out of control  -- Feeling that you may try to harm yourself or another  -- Hearing voices that others do not hear  -- Seeing things that others do not see  -- Cant sleep or eat for 3 days in a row  © 6273-1848 [employer], [address] [place] [address], [place] [address]. All rights reserved. This information is not intended as a substitute for professional medical care. Always follow your healthcare professional's instructions.

## 2017-07-27 NOTE — Clinical Note
Please forward this important TCC information to your provider in order to maximize the post discharge care delivery of this patient.  C3 nurse attempted to contact Rolo ODEN Sergiochase  for a TCC post hospital discharge follow up call. No answer. C3 nurse left a voice message and OOC # given. The patient has a scheduled HOSFU appointment with Scott Valadez MD  on 8/9 @ 4150.  Respectfully, Odalys Franklin RN  [facility]  [email or web address]  Please do not reply to this message, as this inbox is not routinely monitored.

## 2017-08-31 PROBLEM — F33.9 MAJOR DEPRESSION, RECURRENT: Status: ACTIVE | Noted: 2017-08-31

## 2017-08-31 PROBLEM — R09.82 POSTNASAL DISCHARGE: Status: ACTIVE | Noted: 2017-08-31

## 2017-10-12 ENCOUNTER — HISTORICAL (OUTPATIENT)
Dept: ADMINISTRATIVE | Facility: HOSPITAL | Age: 77
End: 2017-10-12

## 2018-01-24 RX ORDER — BUPROPION HYDROCHLORIDE 300 MG/1
TABLET ORAL
Qty: 108 TABLET | Refills: 0 | Status: SHIPPED | OUTPATIENT
Start: 2018-01-24

## 2021-11-09 NOTE — ASSESSMENT & PLAN NOTE
LMTCB to schedule f/u appt for further refills.   Pt not compliant with Zoloft and Wellbutrin. Restart Zoloft and wellbutrin.

## 2022-04-11 ENCOUNTER — HISTORICAL (OUTPATIENT)
Dept: ADMINISTRATIVE | Facility: HOSPITAL | Age: 82
End: 2022-04-11

## 2022-04-29 VITALS
WEIGHT: 211.63 LBS | DIASTOLIC BLOOD PRESSURE: 78 MMHG | BODY MASS INDEX: 32.07 KG/M2 | SYSTOLIC BLOOD PRESSURE: 126 MMHG | OXYGEN SATURATION: 98 % | HEIGHT: 68 IN
